# Patient Record
Sex: FEMALE | Race: WHITE | Employment: OTHER | URBAN - METROPOLITAN AREA
[De-identification: names, ages, dates, MRNs, and addresses within clinical notes are randomized per-mention and may not be internally consistent; named-entity substitution may affect disease eponyms.]

---

## 2019-08-13 ENCOUNTER — APPOINTMENT (OUTPATIENT)
Dept: ULTRASOUND IMAGING | Age: 64
End: 2019-08-13
Payer: MEDICARE

## 2019-08-16 ENCOUNTER — HOSPITAL ENCOUNTER (OUTPATIENT)
Dept: ULTRASOUND IMAGING | Age: 64
Discharge: HOME | End: 2019-08-18
Payer: MEDICARE

## 2019-08-16 ENCOUNTER — HOSPITAL ENCOUNTER (OUTPATIENT)
Dept: NON INVASIVE DIAGNOSTICS | Age: 64
Discharge: HOME | End: 2019-08-16
Payer: MEDICARE

## 2019-08-16 DIAGNOSIS — R42 DIZZINESS: ICD-10-CM

## 2019-08-16 LAB
LV EF: 65 %
LVEF MODALITY: NORMAL

## 2019-08-16 PROCEDURE — 93880 EXTRACRANIAL BILAT STUDY: CPT

## 2019-08-16 PROCEDURE — 93306 TTE W/DOPPLER COMPLETE: CPT

## 2020-01-26 ENCOUNTER — APPOINTMENT (OUTPATIENT)
Dept: GENERAL RADIOLOGY | Age: 65
End: 2020-01-26
Payer: MEDICARE

## 2020-01-26 ENCOUNTER — HOSPITAL ENCOUNTER (EMERGENCY)
Age: 65
Discharge: HOME | End: 2020-01-26
Payer: MEDICARE

## 2020-01-26 VITALS
HEIGHT: 63 IN | DIASTOLIC BLOOD PRESSURE: 78 MMHG | SYSTOLIC BLOOD PRESSURE: 138 MMHG | WEIGHT: 160 LBS | TEMPERATURE: 98.2 F | RESPIRATION RATE: 20 BRPM | BODY MASS INDEX: 28.35 KG/M2 | OXYGEN SATURATION: 98 % | HEART RATE: 80 BPM

## 2020-01-26 LAB
BASOPHILS ABSOLUTE: 0.06 E9/L (ref 0–0.2)
BASOPHILS RELATIVE PERCENT: 0.6 % (ref 0–2)
EOSINOPHILS ABSOLUTE: 0.1 E9/L (ref 0.05–0.5)
EOSINOPHILS RELATIVE PERCENT: 0.9 % (ref 0–6)
HCT VFR BLD CALC: 39.4 % (ref 34–48)
HEMOGLOBIN: 12.8 G/DL (ref 11.5–15.5)
IMMATURE GRANULOCYTES #: 0.06 E9/L
IMMATURE GRANULOCYTES %: 0.6 % (ref 0–5)
LYMPHOCYTES ABSOLUTE: 1.7 E9/L (ref 1.5–4)
LYMPHOCYTES RELATIVE PERCENT: 15.6 % (ref 20–42)
MCH RBC QN AUTO: 31.2 PG (ref 26–35)
MCHC RBC AUTO-ENTMCNC: 32.5 % (ref 32–34.5)
MCV RBC AUTO: 96.1 FL (ref 80–99.9)
MONOCYTES ABSOLUTE: 0.8 E9/L (ref 0.1–0.95)
MONOCYTES RELATIVE PERCENT: 7.4 % (ref 2–12)
NEUTROPHILS ABSOLUTE: 8.16 E9/L (ref 1.8–7.3)
NEUTROPHILS RELATIVE PERCENT: 74.9 % (ref 43–80)
PDW BLD-RTO: 13 FL (ref 11.5–15)
PLATELET # BLD: 330 E9/L (ref 130–450)
PMV BLD AUTO: 8.3 FL (ref 7–12)
RBC # BLD: 4.1 E12/L (ref 3.5–5.5)
WBC # BLD: 10.9 E9/L (ref 4.5–11.5)

## 2020-01-26 PROCEDURE — 96375 TX/PRO/DX INJ NEW DRUG ADDON: CPT

## 2020-01-26 PROCEDURE — 85025 COMPLETE CBC W/AUTO DIFF WBC: CPT

## 2020-01-26 PROCEDURE — 96365 THER/PROPH/DIAG IV INF INIT: CPT

## 2020-01-26 PROCEDURE — 73130 X-RAY EXAM OF HAND: CPT

## 2020-01-26 PROCEDURE — 2580000003 HC RX 258: Performed by: NURSE PRACTITIONER

## 2020-01-26 PROCEDURE — 6360000002 HC RX W HCPCS: Performed by: NURSE PRACTITIONER

## 2020-01-26 PROCEDURE — 90715 TDAP VACCINE 7 YRS/> IM: CPT | Performed by: NURSE PRACTITIONER

## 2020-01-26 PROCEDURE — 36415 COLL VENOUS BLD VENIPUNCTURE: CPT

## 2020-01-26 PROCEDURE — 90471 IMMUNIZATION ADMIN: CPT | Performed by: NURSE PRACTITIONER

## 2020-01-26 PROCEDURE — 99283 EMERGENCY DEPT VISIT LOW MDM: CPT

## 2020-01-26 RX ORDER — AMOXICILLIN AND CLAVULANATE POTASSIUM 875; 125 MG/1; MG/1
1 TABLET, FILM COATED ORAL 2 TIMES DAILY
Qty: 20 TABLET | Refills: 0 | Status: SHIPPED | OUTPATIENT
Start: 2020-01-26 | End: 2020-02-05

## 2020-01-26 RX ORDER — AMPICILLIN AND SULBACTAM 2; 1 G/1; G/1
INJECTION, POWDER, FOR SOLUTION INTRAMUSCULAR; INTRAVENOUS
Status: DISCONTINUED
Start: 2020-01-26 | End: 2020-01-26 | Stop reason: HOSPADM

## 2020-01-26 RX ORDER — KETOROLAC TROMETHAMINE 30 MG/ML
15 INJECTION, SOLUTION INTRAMUSCULAR; INTRAVENOUS ONCE
Status: COMPLETED | OUTPATIENT
Start: 2020-01-26 | End: 2020-01-26

## 2020-01-26 RX ORDER — NAPROXEN 500 MG/1
500 TABLET ORAL 2 TIMES DAILY PRN
Qty: 14 TABLET | Refills: 0 | Status: SHIPPED | OUTPATIENT
Start: 2020-01-26 | End: 2021-10-29

## 2020-01-26 RX ADMIN — SODIUM CHLORIDE 3 G: 9 INJECTION, SOLUTION INTRAVENOUS at 11:33

## 2020-01-26 RX ADMIN — TETANUS TOXOID, REDUCED DIPHTHERIA TOXOID AND ACELLULAR PERTUSSIS VACCINE, ADSORBED 0.5 ML: 5; 2.5; 8; 8; 2.5 SUSPENSION INTRAMUSCULAR at 11:33

## 2020-01-26 RX ADMIN — KETOROLAC TROMETHAMINE 15 MG: 30 INJECTION, SOLUTION INTRAMUSCULAR; INTRAVENOUS at 12:05

## 2020-01-26 ASSESSMENT — PAIN DESCRIPTION - ORIENTATION: ORIENTATION: RIGHT

## 2020-01-26 ASSESSMENT — PAIN DESCRIPTION - LOCATION: LOCATION: HAND

## 2020-01-26 ASSESSMENT — PAIN SCALES - GENERAL
PAINLEVEL_OUTOF10: 3
PAINLEVEL_OUTOF10: 3

## 2020-01-26 ASSESSMENT — PAIN DESCRIPTION - PAIN TYPE: TYPE: ACUTE PAIN

## 2020-01-26 ASSESSMENT — PAIN DESCRIPTION - DESCRIPTORS: DESCRIPTORS: DISCOMFORT

## 2020-01-26 NOTE — ED NOTES
Patient given discharge paperwork at this time. Patient also given scripts. Patient has no further questions at this time. Nurse provided education to patient. Patient is alert and oriented and VS are stable at this time.         Ana Massey RN  01/26/20 5948

## 2020-01-26 NOTE — ED PROVIDER NOTES
Eosinophils Absolute 0.10 0.05 - 0.50 E9/L    Basophils Absolute 0.06 0.00 - 0.20 E9/L     Imaging: All Radiology results interpreted by Radiologist unless otherwise noted. XR HAND RIGHT (MIN 3 VIEWS)   Final Result   Severe degenerative changes          ED Course / Medical Decision Making     Medications   ampicillin-sulbactam (UNASYN) 3 g ivpb minibag (3 g Intravenous New Bag 1/26/20 1133)   ampicillin-sulbactam (UNASYN) 3 (2-1) g injection (has no administration in time range)   ketorolac (TORADOL) injection 15 mg (has no administration in time range)   Tetanus-Diphth-Acell Pertussis (BOOSTRIX) injection 0.5 mL (0.5 mLs Intramuscular Given 1/26/20 1133)     ED Course as of Jan 26 1157   Sun Jan 26, 2020   1111 Dr. Gardiner Head into examine patient.     [SE]      ED Course User Index  [SE] Vidya Hollins, APRN - CNP     Re evaluation:       Consult(s):   None    Procedure(s):   none    MDM:   Cat bite to hand. X-rays negative for any retained foreign bodies. WBCs 10.9 she is afebrile, nontoxic in appearance hemodynamically stable she has no signs of flexor tenosynovitis of the hand and does have cellulitis to the dorsum of the hand it was outlined with surgical pen and instructed to return to the ED for any worsening of symptoms or any streaking, fever or chills. She has no systemic symptoms at this time was given an IV dose of Unasyn and will be discharged with NSAIDs and Augmentin. Her tetanus was updated today. Advised to follow-up with her PCP for reevaluation in the next 1 to 2 days. Counseling: The emergency provider has spoken with the patient and discussed todays results, in addition to providing specific details for the plan of care and counseling regarding the diagnosis and prognosis. Questions are answered at this time and they are agreeable with the plan. Assessment      1. Cat bite of hand, initial encounter    2. Cellulitis of hand    3. Need for Tdap vaccination    4.  Hand arthritis      Plan   Discharge to home  Patient condition is good    New Medications     New Prescriptions    AMOXICILLIN-CLAVULANATE (AUGMENTIN) 875-125 MG PER TABLET    Take 1 tablet by mouth 2 times daily for 10 days    NAPROXEN (NAPROSYN) 500 MG TABLET    Take 1 tablet by mouth 2 times daily as needed for Pain (take with food and full glass of water.)     Electronically signed by YAEL Henderson CNP   DD: 1/26/20  **This report was transcribed using voice recognition software. Every effort was made to ensure accuracy; however, inadvertent computerized transcription errors may be present.   END OF ED PROVIDER NOTE      YAEL Henderson CNP  01/26/20 0535

## 2021-02-24 ENCOUNTER — TELEPHONE (OUTPATIENT)
Dept: VASCULAR SURGERY | Age: 66
End: 2021-02-24

## 2021-02-24 NOTE — TELEPHONE ENCOUNTER
Called to confirm appointment for 2/25/21 at 26, left message with date, time, and phone number for patient.

## 2021-02-25 ENCOUNTER — OFFICE VISIT (OUTPATIENT)
Dept: VASCULAR SURGERY | Age: 66
End: 2021-02-25
Payer: MEDICARE

## 2021-02-25 ENCOUNTER — TELEPHONE (OUTPATIENT)
Dept: VASCULAR SURGERY | Age: 66
End: 2021-02-25

## 2021-02-25 VITALS
SYSTOLIC BLOOD PRESSURE: 122 MMHG | WEIGHT: 170 LBS | BODY MASS INDEX: 30.12 KG/M2 | DIASTOLIC BLOOD PRESSURE: 78 MMHG | HEIGHT: 63 IN

## 2021-02-25 DIAGNOSIS — R23.0 TOE CYANOSIS: ICD-10-CM

## 2021-02-25 DIAGNOSIS — F17.200 TOBACCO DEPENDENCE: ICD-10-CM

## 2021-02-25 DIAGNOSIS — R09.89 DECREASED DORSALIS PEDIS PULSE: ICD-10-CM

## 2021-02-25 DIAGNOSIS — I65.22 LEFT CAROTID STENOSIS: ICD-10-CM

## 2021-02-25 DIAGNOSIS — L97.521 ULCER OF GREAT TOE, LEFT, LIMITED TO BREAKDOWN OF SKIN (HCC): ICD-10-CM

## 2021-02-25 DIAGNOSIS — L81.9 DISCOLORATION OF SKIN OF TOE: ICD-10-CM

## 2021-02-25 DIAGNOSIS — R26.2 DIFFICULTY WALKING: ICD-10-CM

## 2021-02-25 PROCEDURE — 99204 OFFICE O/P NEW MOD 45 MIN: CPT | Performed by: SURGERY

## 2021-02-25 RX ORDER — BUSPIRONE HYDROCHLORIDE 15 MG/1
15 TABLET ORAL 3 TIMES DAILY
COMMUNITY
End: 2022-01-25 | Stop reason: SDUPTHER

## 2021-02-25 RX ORDER — ASPIRIN 81 MG/1
81 TABLET ORAL DAILY
COMMUNITY

## 2021-02-25 NOTE — PROGRESS NOTES
Chief Complaint:   Chief Complaint   Patient presents with    Consultation     new pt. MIAD         HPI: Patient came to the office, for evaluation of multiple vascular issues including known history of carotid artery disease and peripheral vascular disease    Patient noted discoloration of the toes, both feet, left foot more than the right foot, with area of skin breakdown of the left big toe, noted initially a month ago, slowly healing    Patient smokes about 1 pack/day    Patient denies history of diabetes    Denies any Covid virus infection    Patient has a cold exposure, walks in sneakers and socks does not wear insulated boots    Patient has significant musculoskeletal issues including arthritis the lumbar sacral spine and hip      Patient denies any focal lateralizing neurological symptoms like loss of speech, vision or loss of function of extremity    Patient can walk 1-2 blocks slowly, and denies any symptoms of rest pain    Allergies   Allergen Reactions    Codeine      I take vicodin, I had a reaction along time ago    Demerol        Current Outpatient Medications   Medication Sig Dispense Refill    metoprolol tartrate (LOPRESSOR) 25 MG tablet Take 25 mg by mouth 2 times daily      busPIRone (BUSPAR) 15 MG tablet Take 15 mg by mouth 3 times daily      hydrochlorothiazide (HYDRODIURIL) 25 MG tablet Take 25 mg by mouth daily      lisinopril (PRINIVIL;ZESTRIL) 40 MG tablet Take 20 mg by mouth daily       HYDROcodone-acetaminophen (NORCO) 7.5-325 MG per tablet       sertraline (ZOLOFT) 100 MG tablet Take 50 mg by mouth daily       lorazepam (ATIVAN) 0.5 MG tablet Take 1 mg by mouth every 6 hours as needed       naproxen (NAPROSYN) 500 MG tablet Take 1 tablet by mouth 2 times daily as needed for Pain (take with food and full glass of water.) 14 tablet 0    albuterol (PROAIR HFA) 108 (90 BASE) MCG/ACT inhaler Inhale 2 puffs into the lungs every 6 hours as needed for Wheezing for up to 7 days.  1 Inhaler 0     No current facility-administered medications for this visit.         Past Medical History:   Diagnosis Date    Anxiety     Back pain     Chronic back pain     Decreased dorsalis pedis pulse 2/25/2021    Depression     Discoloration of skin of toe 2/25/2021    GERD (gastroesophageal reflux disease)     Headache(784.0)     Hyperlipidemia     Hypertension     Left carotid stenosis 2/25/2021    Thyroid disease     Tobacco dependence 2/25/2021    Toe cyanosis 2/25/2021    Ulcer of great toe, left, limited to breakdown of skin ([address] 2/25/2021       Past Surgical History:   Procedure Laterality Date    ABLATION OF DYSRHYTHMIC FOCUS      CARPAL TUNNEL RELEASE      CHOLECYSTECTOMY      FOOT SURGERY      right foot surgery 5 years ago    HYSTERECTOMY         Family History   Problem Relation Age of Onset    Cancer Mother         breast    Stroke Father        Social History     Socioeconomic History    Marital status:      Spouse name: Not on file    Number of children: Not on file    Years of education: Not on file    Highest education level: Not on file   Occupational History    Not on file   Social Needs    Financial resource strain: Not on file    Food insecurity     Worry: Not on file     Inability: Not on file    Transportation needs     Medical: Not on file     Non-medical: Not on file   Tobacco Use    Smoking status: Current Every Day Smoker     Packs/day: 1.00     Types: Cigarettes    Smokeless tobacco: Never Used   Substance and Sexual Activity    Alcohol use: Yes     Comment: social. rare    Drug use: No    Sexual activity: Not on file   Lifestyle    Physical activity     Days per week: Not on file     Minutes per session: Not on file    Stress: Not on file   Relationships    Social connections     Talks on phone: Not on file     Gets together: Not on file     Attends Yarsanism service: Not on file     Active member of club or organization: Not on file Attends meetings of clubs or organizations: Not on file     Relationship status: Not on file    Intimate partner violence     Fear of current or ex partner: Not on file     Emotionally abused: Not on file     Physically abused: Not on file     Forced sexual activity: Not on file   Other Topics Concern    Not on file   Social History Narrative    Not on file       Review of Systems:  Skin:  No abnormal pigmentation or rash  Eyes:  No blurring, diplopia or vision loss  Ears/Nose/Throat:  No hearing loss or vertigo  Respiratory:  No cough, pleuritic chest pain, dyspnea, or wheezing. Tobacco use, chronic obstructive lung disease  Cardiovascular: No angina, palpitations . Hypertension, hyperlipidemia  Gastrointestinal:  No nausea or vomiting; no abdominal pain or rectal bleeding  Musculoskeletal:  No arthritis or weakness. Neurologic:  No paralysis, paresis, paresthesia, seizures or headaches  Hematologic/Lymphatic/Immunologic:  No anemia, abnormal bleeding/bruising, fever, chills or night sweats. Endocrine:  No heat or cold intolerance. No polyphagia, polydipsia or polyuria. Physical Exam:  General appearance:  Alert, awake, oriented x 3. No distress. Skin:  Warm and dry  Head:  Normocephalic. No masses, lesions or tenderness  Eyes:  Conjunctivae appear normal; PERRL  Ears:  External ears normal  Nose/Sinuses:  Septum midline, mucosa normal; no drainage  Oropharynx:  Clear, no exudate noted  Neck:  No jugular venous distention, lymphadenopathy or thyromegaly. No evidence of carotid bruit  Lungs:  Clear to ausculation bilaterally. No rhonchi, crackles, wheezes  Heart:  Regular rate and rhythm. No rub or murmur  Abdomen:  Soft, non-tender. No masses, organomegaly. Musculoskeletal : No joint effusions, tenderness swelling    Neuro: Speech is intact. Moving all extremities. No focal motor or sensory deficits      Extremities:  Both feet are warm to touch.  The color of both feet is normal.    Discoloration, with cyanosis of the toes noted with areas of skin breakdown that is healing of the left big toe    Pulses Right  Left    Brachial 3 3    Radial    3=normal   Femoral 2 2  2=diminished   Popliteal    1=barely palpable   Dorsalis pedis 1 1  0=absent   Posterior tibial    4=aneurysmal             Other pertinent information:1. The past medical records were reviewed. 2.  Last carotid ultrasound done a year ago was reviewed, left carotid stenosis noted, almost 40 to 50%, right side, less than 40%    Assessment:    1. Tobacco dependence    2. Left carotid stenosis    3. Ulcer of great toe, left, limited to breakdown of skin ([address]    4. Toe cyanosis    5. Discoloration of skin of toe    6. Difficulty walking    7. Decreased dorsalis pedis pulse              Plan:       Discussed the patient, options risks benefits and alternatives were explained to the patient    Patient was recommended to stop smoking completely and continue taking low-dose 81 mg aspirin daily and work-up as outlined below and call me if there are any increasing symptoms          Patient was instructed to continue walking program and to call if any worsening of symptoms and to call if any focal lateralizing neurological symptoms like loss of speech, vision or loss of function of extremity. All the questions were answered. Orders Placed This Encounter   Procedures    VL LOWER EXTREMITY ARTERIAL SEGMENTAL PRESSURES W PPG    US CAROTID ARTERY BILATERAL    SANDY    Anti-DNA Antibody, Double-Stranded    Cryofibrinogen    Cryoglobulin    Rheumatoid Factor    Sedimentation rate, manual             Indicated follow-up: Return in about 4 weeks (around 3/25/2021), or if symptoms worsen or fail to improve.

## 2021-03-05 ENCOUNTER — HOSPITAL ENCOUNTER (OUTPATIENT)
Dept: INTERVENTIONAL RADIOLOGY/VASCULAR | Age: 66
Discharge: HOME | End: 2021-03-07
Payer: MEDICARE

## 2021-03-05 ENCOUNTER — HOSPITAL ENCOUNTER (OUTPATIENT)
Dept: ULTRASOUND IMAGING | Age: 66
Discharge: HOME | End: 2021-03-07
Payer: MEDICARE

## 2021-03-05 ENCOUNTER — HOSPITAL ENCOUNTER (OUTPATIENT)
Age: 66
Discharge: HOME | End: 2021-03-05
Payer: MEDICARE

## 2021-03-05 DIAGNOSIS — I65.22 LEFT CAROTID STENOSIS: ICD-10-CM

## 2021-03-05 DIAGNOSIS — R23.0 TOE CYANOSIS: ICD-10-CM

## 2021-03-05 DIAGNOSIS — R09.89 DECREASED DORSALIS PEDIS PULSE: ICD-10-CM

## 2021-03-05 DIAGNOSIS — L81.9 DISCOLORATION OF SKIN OF TOE: ICD-10-CM

## 2021-03-05 DIAGNOSIS — L97.521 ULCER OF GREAT TOE, LEFT, LIMITED TO BREAKDOWN OF SKIN (HCC): ICD-10-CM

## 2021-03-05 LAB
RHEUMATOID FACTOR: <10 IU/ML (ref 0–13)
SEDIMENTATION RATE, ERYTHROCYTE: 23 MM/HR (ref 0–20)

## 2021-03-05 PROCEDURE — 36415 COLL VENOUS BLD VENIPUNCTURE: CPT

## 2021-03-05 PROCEDURE — 93923 UPR/LXTR ART STDY 3+ LVLS: CPT

## 2021-03-05 PROCEDURE — 85651 RBC SED RATE NONAUTOMATED: CPT

## 2021-03-05 PROCEDURE — 82595 ASSAY OF CRYOGLOBULIN: CPT

## 2021-03-05 PROCEDURE — 86038 ANTINUCLEAR ANTIBODIES: CPT

## 2021-03-05 PROCEDURE — 93880 EXTRACRANIAL BILAT STUDY: CPT | Performed by: RADIOLOGY

## 2021-03-05 PROCEDURE — 82585 ASSAY OF CRYOFIBRINOGEN: CPT

## 2021-03-05 PROCEDURE — 86225 DNA ANTIBODY NATIVE: CPT

## 2021-03-05 PROCEDURE — 86431 RHEUMATOID FACTOR QUANT: CPT

## 2021-03-05 PROCEDURE — 93880 EXTRACRANIAL BILAT STUDY: CPT

## 2021-03-06 LAB — CRYOFIBRINOGEN: NEGATIVE

## 2021-03-08 LAB
ANTI DNA DOUBLE STRANDED: NEGATIVE
ANTI-NUCLEAR ANTIBODY (ANA): NEGATIVE
CRYOGLOBULIN: NEGATIVE

## 2021-03-12 ENCOUNTER — TELEPHONE (OUTPATIENT)
Dept: VASCULAR SURGERY | Age: 66
End: 2021-03-12

## 2021-03-12 PROBLEM — I65.23 BILATERAL CAROTID ARTERY STENOSIS: Status: ACTIVE | Noted: 2021-03-12

## 2021-03-12 RX ORDER — CILOSTAZOL 100 MG/1
100 TABLET ORAL 2 TIMES DAILY
Qty: 60 TABLET | Refills: 11 | Status: SHIPPED
Start: 2021-03-12 | End: 2022-01-25 | Stop reason: SDUPTHER

## 2021-03-12 NOTE — TELEPHONE ENCOUNTER
Message left on patient telephone regarding the rules of the carotid ultrasound, bilaterally 60% stenosis, recheck in 6 months call if any symptoms, arterial Doppler study, left femoral-popliteal arterial occlusive disease, ankle-brachial index of 0.70 with decreased pulse volume control left great toe and second toe, recommend to stop smoking, continue the aspirin, trial of Pletal 100 mg p.o. twice daily, keep the appointment see me in the next few weeks, call the office to discuss further, lab work was okay

## 2021-03-24 ENCOUNTER — TELEPHONE (OUTPATIENT)
Dept: VASCULAR SURGERY | Age: 66
End: 2021-03-24

## 2021-03-25 ENCOUNTER — OFFICE VISIT (OUTPATIENT)
Dept: VASCULAR SURGERY | Age: 66
End: 2021-03-25
Payer: MEDICARE

## 2021-03-25 VITALS — WEIGHT: 170 LBS | BODY MASS INDEX: 30.12 KG/M2 | HEIGHT: 63 IN

## 2021-03-25 DIAGNOSIS — L81.9 DISCOLORATION OF SKIN OF TOE: ICD-10-CM

## 2021-03-25 DIAGNOSIS — F17.200 TOBACCO DEPENDENCE: Primary | ICD-10-CM

## 2021-03-25 DIAGNOSIS — R26.2 DIFFICULTY WALKING: ICD-10-CM

## 2021-03-25 DIAGNOSIS — R09.89 DECREASED DORSALIS PEDIS PULSE: ICD-10-CM

## 2021-03-25 DIAGNOSIS — R23.0 TOE CYANOSIS: ICD-10-CM

## 2021-03-25 DIAGNOSIS — I65.23 BILATERAL CAROTID ARTERY STENOSIS: ICD-10-CM

## 2021-03-25 PROCEDURE — 99214 OFFICE O/P EST MOD 30 MIN: CPT | Performed by: SURGERY

## 2021-03-25 NOTE — PROGRESS NOTES
Chief Complaint:   Chief Complaint   Patient presents with    Circulatory Problem     carotid         HPI: Patient came to the office, for the evaluation of vascular status of the left foot, tells me that the discoloration of the toes is improved, the area of skin breakdown near the great toenail has healed     No ulceration or pain of the left foot    Patient also has some back problems affecting the left leg but stable      The patient is trying to quit smoking completely          Patient denies any focal lateralizing neurological symptoms like loss of speech, vision or loss of function of extremity    Patient can walk a few blocks slowly, and denies any symptoms of rest pain    Allergies   Allergen Reactions    Codeine      I take vicodin, I had a reaction along time ago    Demerol        Current Outpatient Medications   Medication Sig Dispense Refill    cilostazol (PLETAL) 100 MG tablet Take 1 tablet by mouth 2 times daily (Patient not taking: Reported on 3/25/2021) 60 tablet 11    metoprolol tartrate (LOPRESSOR) 25 MG tablet Take 25 mg by mouth 2 times daily      busPIRone (BUSPAR) 15 MG tablet Take 15 mg by mouth 3 times daily      aspirin 81 MG EC tablet Take 81 mg by mouth daily      naproxen (NAPROSYN) 500 MG tablet Take 1 tablet by mouth 2 times daily as needed for Pain (take with food and full glass of water.) 14 tablet 0    hydrochlorothiazide (HYDRODIURIL) 25 MG tablet Take 25 mg by mouth daily      lisinopril (PRINIVIL;ZESTRIL) 40 MG tablet Take 20 mg by mouth daily       albuterol (PROAIR HFA) 108 (90 BASE) MCG/ACT inhaler Inhale 2 puffs into the lungs every 6 hours as needed for Wheezing for up to 7 days. 1 Inhaler 0    HYDROcodone-acetaminophen (NORCO) 7.5-325 MG per tablet       sertraline (ZOLOFT) 100 MG tablet Take 50 mg by mouth daily       lorazepam (ATIVAN) 0.5 MG tablet Take 1 mg by mouth every 6 hours as needed        No current facility-administered medications for this visit. Past Medical History:   Diagnosis Date    Anxiety     Back pain     Bilateral carotid artery stenosis 3/12/2021    Chronic back pain     Decreased dorsalis pedis pulse 2/25/2021    Depression     Discoloration of skin of toe 2/25/2021    GERD (gastroesophageal reflux disease)     Headache(784.0)     Hyperlipidemia     Hypertension     Left carotid stenosis 2/25/2021    Thyroid disease     Tobacco dependence 2/25/2021    Toe cyanosis 2/25/2021    Ulcer of great toe, left, limited to breakdown of skin ([address] 2/25/2021       Past Surgical History:   Procedure Laterality Date    ABLATION OF DYSRHYTHMIC FOCUS      CARPAL TUNNEL RELEASE      CHOLECYSTECTOMY      FOOT SURGERY      right foot surgery 5 years ago    HYSTERECTOMY         Family History   Problem Relation Age of Onset    Cancer Mother         breast    Stroke Father        Social History     Socioeconomic History    Marital status:      Spouse name: Not on file    Number of children: Not on file    Years of education: Not on file    Highest education level: Not on file   Occupational History    Not on file   Social Needs    Financial resource strain: Not on file    Food insecurity     Worry: Not on file     Inability: Not on file    Transportation needs     Medical: Not on file     Non-medical: Not on file   Tobacco Use    Smoking status: Current Every Day Smoker     Packs/day: 1.00     Types: Cigarettes    Smokeless tobacco: Never Used   Substance and Sexual Activity    Alcohol use: Yes     Comment: social. rare    Drug use: No    Sexual activity: Not on file   Lifestyle    Physical activity     Days per week: Not on file     Minutes per session: Not on file    Stress: Not on file   Relationships    Social connections     Talks on phone: Not on file     Gets together: Not on file     Attends [employer] service: Not on file     Active member of club or organization: Not on file     Attends meetings of clubs or organizations: Not on file     Relationship status: Not on file    Intimate partner violence     Fear of current or ex partner: Not on file     Emotionally abused: Not on file     Physically abused: Not on file     Forced sexual activity: Not on file   Other Topics Concern    Not on file   Social History Narrative    Not on file       Review of Systems:    Eyes:  No blurring, diplopia or vision loss. Respiratory:  No cough, pleuritic chest pain, dyspnea, or wheezing. Tobacco use, chronic obstructive lung disease  Cardiovascular: No angina, palpitations . Hypertension, hyperlipidemia  Musculoskeletal:  No arthritis or weakness. Neurologic:  No paralysis, paresis, paresthesia, seizures or headache. Physical Exam:  General appearance:  Alert, awake, oriented x 3. No distress. Eyes:  Conjunctivae appear normal; PERRL  Neck:  No jugular venous distention, lymphadenopathy or thyromegaly. Carotid bruit noted  Lungs:  Clear to ausculation bilaterally. No rhonchi, crackles, wheezes  Heart:  Regular rate and rhythm. No rub or murmur  Abdomen:  Soft, non-tender. No masses, organomegaly. Musculoskeletal : No joint effusions, tenderness swelling    Neuro: Speech is intact. Moving all extremities. No focal motor or sensory deficits      Extremities:  Both feet are warm to touch. The color of both feet is normal.    The area of skin breakdown over the left great toe has healed completely, patient still has slight purplish discoloration of the toes of the left foot compared to the right side, range of motion is intact, overall improvement noted    Pulses Right  Left    Brachial 3 3    Radial    3=normal   Femoral 2 2  2=diminished   Popliteal    1=barely palpable   Dorsalis pedis 2 1  0=absent   Posterior tibial    4=aneurysmal             Other pertinent information:1. The past medical records were reviewed. Assessment:    1. Tobacco dependence    2. Toe cyanosis    3.  Discoloration of skin of toe 4. Decreased dorsalis pedis pulse    5. Bilateral carotid artery stenosis    6. Difficulty walking              Plan:       I had a long detailed discussion the patient regarding the artery Doppler studies and the carotid ultrasound again, reassured, counseled to stop smoking completely, continue the aspirin Pletal, call me as needed for increasing symptoms, explained, if the left foot symptoms get any worse, patient will need angiography to consider the possibility of intervention, all the questions were answered              Patient was instructed to continue walking program and to call if any worsening of symptoms and to call if any focal lateralizing neurological symptoms like loss of speech, vision or loss of function of extremity. All the questions were answered. No orders of the defined types were placed in this encounter. Indicated follow-up: Return in about 2 months (around 5/25/2021), or if symptoms worsen or fail to improve.

## 2021-06-02 ENCOUNTER — TELEPHONE (OUTPATIENT)
Dept: VASCULAR SURGERY | Age: 66
End: 2021-06-02

## 2021-06-03 ENCOUNTER — OFFICE VISIT (OUTPATIENT)
Dept: VASCULAR SURGERY | Age: 66
End: 2021-06-03
Payer: MEDICARE

## 2021-06-03 VITALS — WEIGHT: 170 LBS | BODY MASS INDEX: 30.12 KG/M2 | HEIGHT: 63 IN

## 2021-06-03 DIAGNOSIS — L81.9 DISCOLORATION OF SKIN OF TOE: ICD-10-CM

## 2021-06-03 DIAGNOSIS — I65.23 BILATERAL CAROTID ARTERY STENOSIS: ICD-10-CM

## 2021-06-03 DIAGNOSIS — F17.200 TOBACCO DEPENDENCE: ICD-10-CM

## 2021-06-03 DIAGNOSIS — R26.2 DIFFICULTY WALKING: ICD-10-CM

## 2021-06-03 DIAGNOSIS — R09.89 DECREASED DORSALIS PEDIS PULSE: ICD-10-CM

## 2021-06-03 DIAGNOSIS — I65.23 CAROTID STENOSIS, ASYMPTOMATIC, BILATERAL: Primary | ICD-10-CM

## 2021-06-03 DIAGNOSIS — I73.9 PVD (PERIPHERAL VASCULAR DISEASE) WITH CLAUDICATION (HCC): ICD-10-CM

## 2021-06-03 PROBLEM — L97.521 ULCER OF GREAT TOE, LEFT, LIMITED TO BREAKDOWN OF SKIN (HCC): Status: RESOLVED | Noted: 2021-02-25 | Resolved: 2021-06-03

## 2021-06-03 PROCEDURE — 99213 OFFICE O/P EST LOW 20 MIN: CPT | Performed by: SURGERY

## 2021-06-03 RX ORDER — GABAPENTIN 300 MG/1
300 CAPSULE ORAL 3 TIMES DAILY
COMMUNITY
End: 2022-01-25 | Stop reason: SDUPTHER

## 2021-06-03 NOTE — PROGRESS NOTES
puffs into the lungs every 6 hours as needed for Wheezing for up to 7 days. 1 Inhaler 0     No current facility-administered medications for this visit.        Past Medical History:   Diagnosis Date    Anxiety     Back pain     Bilateral carotid artery stenosis 3/12/2021    Chronic back pain     Decreased dorsalis pedis pulse 2/25/2021    Depression     Discoloration of skin of toe 2/25/2021    GERD (gastroesophageal reflux disease)     Headache(784.0)     Hyperlipidemia     Hypertension     Left carotid stenosis 2/25/2021    PVD (peripheral vascular disease) with claudication ([address] 6/3/2021    Thyroid disease     Tobacco dependence 2/25/2021    Toe cyanosis 2/25/2021    Ulcer of great toe, left, limited to breakdown of skin ([address] 2/25/2021       Past Surgical History:   Procedure Laterality Date    ABLATION OF DYSRHYTHMIC FOCUS      CARPAL TUNNEL RELEASE      CHOLECYSTECTOMY      FOOT SURGERY      right foot surgery 5 years ago    HYSTERECTOMY         Family History   Problem Relation Age of Onset    Cancer Mother         breast    Stroke Father        Social History     Socioeconomic History    Marital status:      Spouse name: Not on file    Number of children: Not on file    Years of education: Not on file    Highest education level: Not on file   Occupational History    Not on file   Tobacco Use    Smoking status: Current Every Day Smoker     Packs/day: 1.00     Types: Cigarettes    Smokeless tobacco: Never Used   Substance and Sexual Activity    Alcohol use: Yes     Comment: social. rare    Drug use: No    Sexual activity: Not on file   Other Topics Concern    Not on file   Social History Narrative    Not on file     Social Determinants of Health     Financial Resource Strain:     Difficulty of Paying Living Expenses:    Food Insecurity:     Worried About Running Out of Food in the Last Year:     Ran Out of Food in the Last Year:    Transportation Needs:     Lack

## 2021-06-19 ENCOUNTER — HOSPITAL ENCOUNTER (EMERGENCY)
Age: 66
Discharge: HOME | End: 2021-06-19
Attending: EMERGENCY MEDICINE
Payer: MEDICARE

## 2021-06-19 ENCOUNTER — APPOINTMENT (OUTPATIENT)
Dept: GENERAL RADIOLOGY | Age: 66
End: 2021-06-19
Payer: MEDICARE

## 2021-06-19 VITALS
HEART RATE: 72 BPM | BODY MASS INDEX: 30.12 KG/M2 | TEMPERATURE: 97.5 F | WEIGHT: 170 LBS | OXYGEN SATURATION: 96 % | SYSTOLIC BLOOD PRESSURE: 131 MMHG | DIASTOLIC BLOOD PRESSURE: 63 MMHG | RESPIRATION RATE: 20 BRPM | HEIGHT: 63 IN

## 2021-06-19 DIAGNOSIS — R07.9 CHEST PAIN, UNSPECIFIED TYPE: Primary | ICD-10-CM

## 2021-06-19 LAB
ALBUMIN SERPL-MCNC: 3.8 G/DL (ref 3.5–5.2)
ALP BLD-CCNC: 64 U/L (ref 35–104)
ALT SERPL-CCNC: 11 U/L (ref 0–32)
ANION GAP SERPL CALCULATED.3IONS-SCNC: 10 MMOL/L (ref 7–16)
APTT: 28 SEC (ref 24.5–35.1)
AST SERPL-CCNC: 14 U/L (ref 0–31)
BASOPHILS ABSOLUTE: 0.07 E9/L (ref 0–0.2)
BASOPHILS RELATIVE PERCENT: 0.9 % (ref 0–2)
BILIRUB SERPL-MCNC: 0.2 MG/DL (ref 0–1.2)
BUN BLDV-MCNC: 16 MG/DL (ref 6–23)
CALCIUM SERPL-MCNC: 9.2 MG/DL (ref 8.6–10.2)
CHLORIDE BLD-SCNC: 105 MMOL/L (ref 98–107)
CO2: 23 MMOL/L (ref 22–29)
CREAT SERPL-MCNC: 0.9 MG/DL (ref 0.5–1)
EOSINOPHILS ABSOLUTE: 0.2 E9/L (ref 0.05–0.5)
EOSINOPHILS RELATIVE PERCENT: 2.6 % (ref 0–6)
GFR AFRICAN AMERICAN: >60
GFR NON-AFRICAN AMERICAN: >60 ML/MIN/1.73
GLUCOSE BLD-MCNC: 86 MG/DL (ref 74–99)
HCT VFR BLD CALC: 39.1 % (ref 34–48)
HEMOGLOBIN: 12.9 G/DL (ref 11.5–15.5)
IMMATURE GRANULOCYTES #: 0.04 E9/L
IMMATURE GRANULOCYTES %: 0.5 % (ref 0–5)
INR BLD: 0.9
LYMPHOCYTES ABSOLUTE: 2.47 E9/L (ref 1.5–4)
LYMPHOCYTES RELATIVE PERCENT: 32 % (ref 20–42)
MCH RBC QN AUTO: 31 PG (ref 26–35)
MCHC RBC AUTO-ENTMCNC: 33 % (ref 32–34.5)
MCV RBC AUTO: 94 FL (ref 80–99.9)
MONOCYTES ABSOLUTE: 0.64 E9/L (ref 0.1–0.95)
MONOCYTES RELATIVE PERCENT: 8.3 % (ref 2–12)
NEUTROPHILS ABSOLUTE: 4.31 E9/L (ref 1.8–7.3)
NEUTROPHILS RELATIVE PERCENT: 55.7 % (ref 43–80)
PDW BLD-RTO: 13.3 FL (ref 11.5–15)
PLATELET # BLD: 305 E9/L (ref 130–450)
PMV BLD AUTO: 8.6 FL (ref 7–12)
POTASSIUM REFLEX MAGNESIUM: 3.9 MMOL/L (ref 3.5–5)
PROTHROMBIN TIME: 10.1 SEC (ref 9.3–12.4)
RBC # BLD: 4.16 E12/L (ref 3.5–5.5)
SODIUM BLD-SCNC: 138 MMOL/L (ref 132–146)
TOTAL PROTEIN: 6.4 G/DL (ref 6.4–8.3)
TROPONIN, HIGH SENSITIVITY: 10 NG/L (ref 0–9)
TROPONIN, HIGH SENSITIVITY: 7 NG/L (ref 0–9)
TROPONIN, HIGH SENSITIVITY: 8 NG/L (ref 0–9)
WBC # BLD: 7.7 E9/L (ref 4.5–11.5)

## 2021-06-19 PROCEDURE — 99285 EMERGENCY DEPT VISIT HI MDM: CPT

## 2021-06-19 PROCEDURE — 84484 ASSAY OF TROPONIN QUANT: CPT

## 2021-06-19 PROCEDURE — 6370000000 HC RX 637 (ALT 250 FOR IP): Performed by: EMERGENCY MEDICINE

## 2021-06-19 PROCEDURE — 36415 COLL VENOUS BLD VENIPUNCTURE: CPT

## 2021-06-19 PROCEDURE — 85025 COMPLETE CBC W/AUTO DIFF WBC: CPT

## 2021-06-19 PROCEDURE — 93005 ELECTROCARDIOGRAM TRACING: CPT | Performed by: EMERGENCY MEDICINE

## 2021-06-19 PROCEDURE — 80053 COMPREHEN METABOLIC PANEL: CPT

## 2021-06-19 PROCEDURE — 71045 X-RAY EXAM CHEST 1 VIEW: CPT

## 2021-06-19 PROCEDURE — 85730 THROMBOPLASTIN TIME PARTIAL: CPT

## 2021-06-19 PROCEDURE — 85610 PROTHROMBIN TIME: CPT

## 2021-06-19 RX ORDER — NICOTINE 21 MG/24HR
1 PATCH, TRANSDERMAL 24 HOURS TRANSDERMAL ONCE
Status: DISCONTINUED | OUTPATIENT
Start: 2021-06-19 | End: 2021-06-19 | Stop reason: HOSPADM

## 2021-06-19 NOTE — ED PROVIDER NOTES
HPI:  6/19/21,   Time: 12:56 PM EDT       Kylah Nevarez is a 74 y.o. female presenting to the ED for cp, beginning 2 hrs ago. The complaint has been intermittent, mild in severity, and worsened by nothing. Bib ems, pain relieved now, left sided, achy in nature. No cough/congestion/runny nose/sore throat. Given asa by ems. Hx same years ago, never found cause. No sob/sweats/fever/chills. No rash/achy muscles    Review of Systems:   Pertinent positives and negatives are stated within HPI, all other systems reviewed and are negative.          --------------------------------------------- PAST HISTORY ---------------------------------------------  Past Medical History:  has a past medical history of Anxiety, Back pain, Bilateral carotid artery stenosis, Chronic back pain, Decreased dorsalis pedis pulse, Depression, Discoloration of skin of toe, GERD (gastroesophageal reflux disease), Headache(784.0), Hyperlipidemia, Hypertension, Left carotid stenosis, PVD (peripheral vascular disease) with claudication ([address] Thyroid disease, Tobacco dependence, Toe cyanosis, and Ulcer of great toe, left, limited to breakdown of skin ([address] Past Surgical History:  has a past surgical history that includes ablation of dysrhythmic focus; Hysterectomy; Cholecystectomy; Carpal tunnel release; and Foot surgery. Social History:  reports that she has been smoking cigarettes. She has been smoking about 1.00 pack per day. She has never used smokeless tobacco. She reports current alcohol use. She reports that she does not use drugs. Family History: family history includes Cancer in her mother; Stroke in her father. The patients home medications have been reviewed.     Allergies: Codeine and Demerol        ---------------------------------------------------PHYSICAL EXAM--------------------------------------    Constitutional/General: Alert and oriented x3, well appearing, non toxic in NAD  Head: Normocephalic and atraumatic  Eyes: PERRL, EOMI, conjunctive normal, sclera non icteric  Mouth: Oropharynx clear, handling secretions,   Neck: Supple, full ROM,   Respiratory: Lungs clear to auscultation bilaterally, no wheezes, rales, or rhonchi. Not in respiratory distress  Cardiovascular:  Regular rate. Regular rhythm. No murmurs, gallops, or rubs. 2+ distal pulses  Chest: No chest wall tenderness  GI:  Abdomen Soft, Non tender, Non distended. Musculoskeletal: Moves all extremities x 4. Warm and well perfused, no clubbing, cyanosis, or edema. Capillary refill <3 seconds  Integument: skin warm and dry. No rashes. Lymphatic: no lymphadenopathy noted  Neurologic: GCS 15, no focal deficits, s  Psychiatric: Normal Affect    -------------------------------------------------- RESULTS -------------------------------------------------  I have personally reviewed all laboratory and imaging results for this patient. Results are listed below.      LABS:  Results for orders placed or performed during the hospital encounter of 06/19/21   CBC Auto Differential   Result Value Ref Range    WBC 7.7 4.5 - 11.5 E9/L    RBC 4.16 3.50 - 5.50 E12/L    Hemoglobin 12.9 11.5 - 15.5 g/dL    Hematocrit 39.1 34.0 - 48.0 %    MCV 94.0 80.0 - 99.9 fL    MCH 31.0 26.0 - 35.0 pg    MCHC 33.0 32.0 - 34.5 %    RDW 13.3 11.5 - 15.0 fL    Platelets 626 956 - 079 E9/L    MPV 8.6 7.0 - 12.0 fL    Neutrophils % 55.7 43.0 - 80.0 %    Immature Granulocytes % 0.5 0.0 - 5.0 %    Lymphocytes % 32.0 20.0 - 42.0 %    Monocytes % 8.3 2.0 - 12.0 %    Eosinophils % 2.6 0.0 - 6.0 %    Basophils % 0.9 0.0 - 2.0 %    Neutrophils Absolute 4.31 1.80 - 7.30 E9/L    Immature Granulocytes # 0.04 E9/L    Lymphocytes Absolute 2.47 1.50 - 4.00 E9/L    Monocytes Absolute 0.64 0.10 - 0.95 E9/L    Eosinophils Absolute 0.20 0.05 - 0.50 E9/L    Basophils Absolute 0.07 0.00 - 0.20 E9/L   Comprehensive Metabolic Panel w/ Reflex to MG   Result Value Ref Range    Sodium 138 132 - 146 mmol/L Potassium reflex Magnesium 3.9 3.5 - 5.0 mmol/L    Chloride 105 98 - 107 mmol/L    CO2 23 22 - 29 mmol/L    Anion Gap 10 7 - 16 mmol/L    Glucose 86 74 - 99 mg/dL    BUN 16 6 - 23 mg/dL    CREATININE 0.9 0.5 - 1.0 mg/dL    GFR Non-African American >60 >=60 mL/min/1.73    GFR African American >60     Calcium 9.2 8.6 - 10.2 mg/dL    Total Protein 6.4 6.4 - 8.3 g/dL    Albumin 3.8 3.5 - 5.2 g/dL    Total Bilirubin 0.2 0.0 - 1.2 mg/dL    Alkaline Phosphatase 64 35 - 104 U/L    ALT 11 0 - 32 U/L    AST 14 0 - 31 U/L   Troponin   Result Value Ref Range    Troponin, High Sensitivity 7 0 - 9 ng/L   Protime-INR   Result Value Ref Range    Protime 10.1 9.3 - 12.4 sec    INR 0.9    APTT   Result Value Ref Range    aPTT 28.0 24.5 - 35.1 sec   Troponin   Result Value Ref Range    Troponin, High Sensitivity 10 (H) 0 - 9 ng/L   Troponin   Result Value Ref Range    Troponin, High Sensitivity 8 0 - 9 ng/L       RADIOLOGY:  Interpreted by Radiologist.  XR CHEST PORTABLE   Final Result   No radiographic evidence of acute cardiopulmonary disease process             EKG:  This EKG is signed and interpreted by the EP. Time: 1256  Rate: 70  Rhythm: Sinus  Interpretation: non-specific EKG  Comparison: None      ------------------------- NURSING NOTES AND VITALS REVIEWED ---------------------------   The nursing notes within the ED encounter and vital signs as below have been reviewed by myself. /63   Pulse 72   Temp 97.5 °F (36.4 °C)   Resp 20   Ht 5' 3\" (1.6 m)   Wt 170 lb (77.1 kg)   SpO2 96%   BMI 30.11 kg/m²   Oxygen Saturation Interpretation: Normal    The patients available past medical records and past encounters were reviewed.         ------------------------------ ED COURSE/MEDICAL DECISION MAKING----------------------  Medications   nicotine (NICODERM CQ) 21 MG/24HR 1 patch (1 patch Transdermal Patch Applied 6/19/21 3502)         ED COURSE:       Medical Decision Making:    Pt trop x 3 within 5, hear score under 6, feel can dc with close outpt fu. Pt agreeable with poc. Sx not pe clinically      This patient's ED course included: a personal history and physicial examination    This patient has remained hemodynamically stable during their ED course. Re-Evaluations:             Re-evaluation. Patients symptoms show no change    Re-examination  6/19/21   4:56 PM EDT          Vital Signs:   Vitals:    06/19/21 1240 06/19/21 1543 06/19/21 1742   BP: (!) 144/96 (!) 140/71 131/63   Pulse: 71 72 72   Resp: 16 17 20   Temp: 97.5 °F (36.4 °C)     SpO2: 96% 94% 96%   Weight: 170 lb (77.1 kg)     Height: 5' 3\" (1.6 m)       Card/Pulm:  Rhythm: normal rate. Heart Sounds: no murmurs, gallops, or rubs. clear to auscultation, no wheezes or rales and unlabored breathing. Capillary Refill: normal.  Radial Pulse:  equal.  Skin:  Warm. Consultations:                 Critical Care:         Counseling: The emergency provider has spoken with the patient and discussed todays results, in addition to providing specific details for the plan of care and counseling regarding the diagnosis and prognosis. Questions are answered at this time and they are agreeable with the plan.       --------------------------------- IMPRESSION AND DISPOSITION ---------------------------------    IMPRESSION  1. Chest pain, unspecified type        DISPOSITION  Disposition: Discharge to home  Patient condition is stable    NOTE: This report was transcribed using voice recognition software.  Every effort was made to ensure accuracy; however, inadvertent computerized transcription errors may be present        Dwight Gibson MD  06/19/21 0898

## 2021-06-19 NOTE — ED NOTES
Bed: 14A-14  Expected date:   Expected time:   Means of arrival:   Comments:  Zoraida Dukes female     Job Rather, RN  06/19/21 7919

## 2021-06-20 LAB
EKG ATRIAL RATE: 65 BPM
EKG P AXIS: 27 DEGREES
EKG P-R INTERVAL: 194 MS
EKG Q-T INTERVAL: 406 MS
EKG QRS DURATION: 76 MS
EKG QTC CALCULATION (BAZETT): 422 MS
EKG R AXIS: 20 DEGREES
EKG T AXIS: 21 DEGREES
EKG VENTRICULAR RATE: 65 BPM

## 2021-06-20 PROCEDURE — 93010 ELECTROCARDIOGRAM REPORT: CPT | Performed by: INTERNAL MEDICINE

## 2021-08-19 ENCOUNTER — TELEPHONE (OUTPATIENT)
Dept: VASCULAR SURGERY | Age: 66
End: 2021-08-19

## 2021-09-10 ENCOUNTER — HOSPITAL ENCOUNTER (OUTPATIENT)
Dept: ULTRASOUND IMAGING | Age: 66
Discharge: HOME | End: 2021-09-12
Payer: MEDICARE

## 2021-09-10 DIAGNOSIS — I65.23 CAROTID STENOSIS, ASYMPTOMATIC, BILATERAL: ICD-10-CM

## 2021-09-10 PROCEDURE — 93880 EXTRACRANIAL BILAT STUDY: CPT

## 2021-09-10 PROCEDURE — 93880 EXTRACRANIAL BILAT STUDY: CPT | Performed by: RADIOLOGY

## 2021-09-14 ENCOUNTER — TELEPHONE (OUTPATIENT)
Dept: VASCULAR SURGERY | Age: 66
End: 2021-09-14

## 2021-09-14 NOTE — TELEPHONE ENCOUNTER
Message left for the patient regarding the carotid ultrasound, based upon my personal interpretation of the ultrasound, based upon the velocities as well as atherosclerotic plaque, 60 to 70% noted, bilaterally, the patient is asymptomatic, follow conservatively with a follow-up evaluation in 6 months and call immediately if any neurological symptoms explained, call the office if any questions

## 2021-10-29 ENCOUNTER — APPOINTMENT (OUTPATIENT)
Dept: GENERAL RADIOLOGY | Age: 66
DRG: 027 | End: 2021-10-29
Payer: MEDICARE

## 2021-10-29 ENCOUNTER — HOSPITAL ENCOUNTER (EMERGENCY)
Age: 66
Discharge: HOME | DRG: 027 | End: 2021-10-29
Attending: STUDENT IN AN ORGANIZED HEALTH CARE EDUCATION/TRAINING PROGRAM
Payer: MEDICARE

## 2021-10-29 VITALS
RESPIRATION RATE: 18 BRPM | TEMPERATURE: 98.2 F | SYSTOLIC BLOOD PRESSURE: 190 MMHG | DIASTOLIC BLOOD PRESSURE: 73 MMHG | OXYGEN SATURATION: 98 % | WEIGHT: 170 LBS | BODY MASS INDEX: 30.11 KG/M2 | HEART RATE: 60 BPM

## 2021-10-29 DIAGNOSIS — R03.0 ELEVATED BLOOD PRESSURE READING: ICD-10-CM

## 2021-10-29 DIAGNOSIS — R41.0 INTERMITTENT CONFUSION: Primary | ICD-10-CM

## 2021-10-29 LAB
ANION GAP SERPL CALCULATED.3IONS-SCNC: 9 MMOL/L (ref 7–16)
BACTERIA: ABNORMAL /HPF
BASOPHILS ABSOLUTE: 0.07 E9/L (ref 0–0.2)
BASOPHILS RELATIVE PERCENT: 0.9 % (ref 0–2)
BILIRUBIN URINE: NEGATIVE
BLOOD, URINE: NEGATIVE
BUN BLDV-MCNC: 14 MG/DL (ref 6–23)
CALCIUM SERPL-MCNC: 9.6 MG/DL (ref 8.6–10.2)
CHLORIDE BLD-SCNC: 104 MMOL/L (ref 98–107)
CLARITY: CLEAR
CO2: 28 MMOL/L (ref 22–29)
COLOR: YELLOW
CREAT SERPL-MCNC: 1 MG/DL (ref 0.5–1)
EOSINOPHILS ABSOLUTE: 0.18 E9/L (ref 0.05–0.5)
EOSINOPHILS RELATIVE PERCENT: 2.4 % (ref 0–6)
GFR AFRICAN AMERICAN: >60
GFR NON-AFRICAN AMERICAN: 55 ML/MIN/1.73
GLUCOSE BLD-MCNC: 110 MG/DL (ref 74–99)
GLUCOSE URINE: NEGATIVE MG/DL
HCT VFR BLD CALC: 41.1 % (ref 34–48)
HEMOGLOBIN: 13.8 G/DL (ref 11.5–15.5)
IMMATURE GRANULOCYTES #: 0.03 E9/L
IMMATURE GRANULOCYTES %: 0.4 % (ref 0–5)
KETONES, URINE: NEGATIVE MG/DL
LEUKOCYTE ESTERASE, URINE: NEGATIVE
LYMPHOCYTES ABSOLUTE: 2.52 E9/L (ref 1.5–4)
LYMPHOCYTES RELATIVE PERCENT: 34 % (ref 20–42)
MAGNESIUM: 2.3 MG/DL (ref 1.6–2.6)
MCH RBC QN AUTO: 30.8 PG (ref 26–35)
MCHC RBC AUTO-ENTMCNC: 33.6 % (ref 32–34.5)
MCV RBC AUTO: 91.7 FL (ref 80–99.9)
MONOCYTES ABSOLUTE: 0.61 E9/L (ref 0.1–0.95)
MONOCYTES RELATIVE PERCENT: 8.2 % (ref 2–12)
NEUTROPHILS ABSOLUTE: 4 E9/L (ref 1.8–7.3)
NEUTROPHILS RELATIVE PERCENT: 54.1 % (ref 43–80)
NITRITE, URINE: NEGATIVE
PDW BLD-RTO: 13.1 FL (ref 11.5–15)
PH UA: 6 (ref 5–9)
PLATELET # BLD: 303 E9/L (ref 130–450)
PMV BLD AUTO: 8.9 FL (ref 7–12)
POTASSIUM SERPL-SCNC: 4.5 MMOL/L (ref 3.5–5)
PROTEIN UA: NEGATIVE MG/DL
RBC # BLD: 4.48 E12/L (ref 3.5–5.5)
RBC UA: ABNORMAL /HPF (ref 0–2)
SODIUM BLD-SCNC: 141 MMOL/L (ref 132–146)
SPECIFIC GRAVITY UA: 1.01 (ref 1–1.03)
TROPONIN, HIGH SENSITIVITY: 7 NG/L (ref 0–9)
UROBILINOGEN, URINE: 0.2 E.U./DL
WBC # BLD: 7.4 E9/L (ref 4.5–11.5)
WBC UA: ABNORMAL /HPF (ref 0–5)

## 2021-10-29 PROCEDURE — 36415 COLL VENOUS BLD VENIPUNCTURE: CPT

## 2021-10-29 PROCEDURE — 83735 ASSAY OF MAGNESIUM: CPT

## 2021-10-29 PROCEDURE — 84484 ASSAY OF TROPONIN QUANT: CPT

## 2021-10-29 PROCEDURE — 71045 X-RAY EXAM CHEST 1 VIEW: CPT

## 2021-10-29 PROCEDURE — 99283 EMERGENCY DEPT VISIT LOW MDM: CPT

## 2021-10-29 PROCEDURE — 81001 URINALYSIS AUTO W/SCOPE: CPT

## 2021-10-29 PROCEDURE — 85025 COMPLETE CBC W/AUTO DIFF WBC: CPT

## 2021-10-29 PROCEDURE — 93005 ELECTROCARDIOGRAM TRACING: CPT | Performed by: STUDENT IN AN ORGANIZED HEALTH CARE EDUCATION/TRAINING PROGRAM

## 2021-10-29 PROCEDURE — 80048 BASIC METABOLIC PNL TOTAL CA: CPT

## 2021-10-29 PROCEDURE — 70450 CT HEAD/BRAIN W/O DYE: CPT

## 2021-10-29 RX ORDER — HYDROCHLOROTHIAZIDE 25 MG/1
25 TABLET ORAL DAILY
Qty: 30 TABLET | Refills: 0 | Status: SHIPPED | OUTPATIENT
Start: 2021-10-29 | End: 2021-11-23 | Stop reason: SDUPTHER

## 2021-10-29 RX ORDER — LISINOPRIL 20 MG/1
40 TABLET ORAL DAILY
Qty: 30 TABLET | Refills: 0 | Status: SHIPPED | OUTPATIENT
Start: 2021-10-29 | End: 2021-11-23 | Stop reason: SDUPTHER

## 2021-10-29 NOTE — ED NOTES
Pt given discharge instructions with daughter at bedside, MD aware of high b/p and pt informed to  b/p meds from assigned pharmacy and take meds today as prescribed.   Pt also informed to call neurologist and [facility] physicians to schedule follow up appointments     Casandra Burkitt, RN  10/29/21 0547
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
## 2021-10-30 LAB
EKG ATRIAL RATE: 49 BPM
EKG P AXIS: -4 DEGREES
EKG P-R INTERVAL: 194 MS
EKG Q-T INTERVAL: 440 MS
EKG QRS DURATION: 82 MS
EKG QTC CALCULATION (BAZETT): 397 MS
EKG R AXIS: 0 DEGREES
EKG T AXIS: 7 DEGREES
EKG VENTRICULAR RATE: 49 BPM

## 2021-10-30 PROCEDURE — 93010 ELECTROCARDIOGRAM REPORT: CPT | Performed by: INTERNAL MEDICINE

## 2021-10-31 ENCOUNTER — HOSPITAL ENCOUNTER (INPATIENT)
Age: 66
LOS: 5 days | Discharge: HOME | DRG: 027 | End: 2021-11-05
Attending: STUDENT IN AN ORGANIZED HEALTH CARE EDUCATION/TRAINING PROGRAM | Admitting: FAMILY MEDICINE
Payer: MEDICARE

## 2021-10-31 ENCOUNTER — APPOINTMENT (OUTPATIENT)
Dept: CT IMAGING | Age: 66
DRG: 027 | End: 2021-10-31
Payer: MEDICARE

## 2021-10-31 DIAGNOSIS — I63.9 ACUTE CVA (CEREBROVASCULAR ACCIDENT) (HCC): Primary | ICD-10-CM

## 2021-10-31 DIAGNOSIS — G89.18 ACUTE POSTOPERATIVE PAIN: ICD-10-CM

## 2021-10-31 DIAGNOSIS — I10 ESSENTIAL HYPERTENSION: ICD-10-CM

## 2021-10-31 LAB
ALBUMIN SERPL-MCNC: 4.2 G/DL (ref 3.5–5.2)
ALP BLD-CCNC: 87 U/L (ref 35–104)
ALT SERPL-CCNC: 12 U/L (ref 0–32)
ANION GAP SERPL CALCULATED.3IONS-SCNC: 13 MMOL/L (ref 7–16)
AST SERPL-CCNC: 17 U/L (ref 0–31)
BASOPHILS ABSOLUTE: 0.07 E9/L (ref 0–0.2)
BASOPHILS RELATIVE PERCENT: 0.7 % (ref 0–2)
BILIRUB SERPL-MCNC: 0.4 MG/DL (ref 0–1.2)
BUN BLDV-MCNC: 15 MG/DL (ref 6–23)
CALCIUM SERPL-MCNC: 9.4 MG/DL (ref 8.6–10.2)
CHLORIDE BLD-SCNC: 97 MMOL/L (ref 98–107)
CHP ED QC CHECK: NORMAL
CO2: 22 MMOL/L (ref 22–29)
CREAT SERPL-MCNC: 1 MG/DL (ref 0.5–1)
EOSINOPHILS ABSOLUTE: 0.17 E9/L (ref 0.05–0.5)
EOSINOPHILS RELATIVE PERCENT: 1.8 % (ref 0–6)
ETHANOL: <10 MG/DL (ref 0–0.08)
GFR AFRICAN AMERICAN: >60
GFR NON-AFRICAN AMERICAN: 55 ML/MIN/1.73
GLUCOSE BLD-MCNC: 96 MG/DL
GLUCOSE BLD-MCNC: 97 MG/DL (ref 74–99)
HCT VFR BLD CALC: 43 % (ref 34–48)
HEMOGLOBIN: 14.5 G/DL (ref 11.5–15.5)
IMMATURE GRANULOCYTES #: 0.04 E9/L
IMMATURE GRANULOCYTES %: 0.4 % (ref 0–5)
LYMPHOCYTES ABSOLUTE: 2.63 E9/L (ref 1.5–4)
LYMPHOCYTES RELATIVE PERCENT: 27.4 % (ref 20–42)
MAGNESIUM: 1.8 MG/DL (ref 1.6–2.6)
MCH RBC QN AUTO: 30.2 PG (ref 26–35)
MCHC RBC AUTO-ENTMCNC: 33.7 % (ref 32–34.5)
MCV RBC AUTO: 89.6 FL (ref 80–99.9)
METER GLUCOSE: 96 MG/DL (ref 74–99)
MONOCYTES ABSOLUTE: 0.71 E9/L (ref 0.1–0.95)
MONOCYTES RELATIVE PERCENT: 7.4 % (ref 2–12)
NEUTROPHILS ABSOLUTE: 5.97 E9/L (ref 1.8–7.3)
NEUTROPHILS RELATIVE PERCENT: 62.3 % (ref 43–80)
PDW BLD-RTO: 12.8 FL (ref 11.5–15)
PLATELET # BLD: 308 E9/L (ref 130–450)
PMV BLD AUTO: 8.7 FL (ref 7–12)
POTASSIUM SERPL-SCNC: 3.9 MMOL/L (ref 3.5–5)
RBC # BLD: 4.8 E12/L (ref 3.5–5.5)
SODIUM BLD-SCNC: 132 MMOL/L (ref 132–146)
TOTAL PROTEIN: 6.4 G/DL (ref 6.4–8.3)
TROPONIN, HIGH SENSITIVITY: 7 NG/L (ref 0–9)
TSH SERPL DL<=0.05 MIU/L-ACNC: 2.44 UIU/ML (ref 0.27–4.2)
WBC # BLD: 9.6 E9/L (ref 4.5–11.5)

## 2021-10-31 PROCEDURE — 93005 ELECTROCARDIOGRAM TRACING: CPT | Performed by: PHYSICIAN ASSISTANT

## 2021-10-31 PROCEDURE — 83735 ASSAY OF MAGNESIUM: CPT

## 2021-10-31 PROCEDURE — 36415 COLL VENOUS BLD VENIPUNCTURE: CPT

## 2021-10-31 PROCEDURE — 99283 EMERGENCY DEPT VISIT LOW MDM: CPT

## 2021-10-31 PROCEDURE — 85025 COMPLETE CBC W/AUTO DIFF WBC: CPT

## 2021-10-31 PROCEDURE — 82077 ASSAY SPEC XCP UR&BREATH IA: CPT

## 2021-10-31 PROCEDURE — 84443 ASSAY THYROID STIM HORMONE: CPT

## 2021-10-31 PROCEDURE — 6370000000 HC RX 637 (ALT 250 FOR IP): Performed by: STUDENT IN AN ORGANIZED HEALTH CARE EDUCATION/TRAINING PROGRAM

## 2021-10-31 PROCEDURE — 82962 GLUCOSE BLOOD TEST: CPT

## 2021-10-31 PROCEDURE — 70450 CT HEAD/BRAIN W/O DYE: CPT

## 2021-10-31 PROCEDURE — 2060000000 HC ICU INTERMEDIATE R&B

## 2021-10-31 PROCEDURE — 84484 ASSAY OF TROPONIN QUANT: CPT

## 2021-10-31 PROCEDURE — 80053 COMPREHEN METABOLIC PANEL: CPT

## 2021-10-31 RX ORDER — ONDANSETRON 2 MG/ML
4 INJECTION INTRAMUSCULAR; INTRAVENOUS EVERY 6 HOURS PRN
Status: DISCONTINUED | OUTPATIENT
Start: 2021-10-31 | End: 2021-11-05 | Stop reason: HOSPADM

## 2021-10-31 RX ORDER — GABAPENTIN 300 MG/1
300 CAPSULE ORAL 3 TIMES DAILY
Status: DISCONTINUED | OUTPATIENT
Start: 2021-10-31 | End: 2021-11-05 | Stop reason: HOSPADM

## 2021-10-31 RX ORDER — CLOPIDOGREL 300 MG/1
300 TABLET, FILM COATED ORAL ONCE
Status: COMPLETED | OUTPATIENT
Start: 2021-10-31 | End: 2021-10-31

## 2021-10-31 RX ORDER — CLOPIDOGREL BISULFATE 75 MG/1
75 TABLET ORAL DAILY
Status: DISCONTINUED | OUTPATIENT
Start: 2021-11-01 | End: 2021-11-04

## 2021-10-31 RX ORDER — HYDROCHLOROTHIAZIDE 25 MG/1
25 TABLET ORAL DAILY
Status: DISCONTINUED | OUTPATIENT
Start: 2021-11-01 | End: 2021-11-05 | Stop reason: HOSPADM

## 2021-10-31 RX ORDER — NICOTINE 21 MG/24HR
1 PATCH, TRANSDERMAL 24 HOURS TRANSDERMAL DAILY
Status: DISCONTINUED | OUTPATIENT
Start: 2021-11-01 | End: 2021-11-05 | Stop reason: HOSPADM

## 2021-10-31 RX ORDER — ATORVASTATIN CALCIUM 40 MG/1
80 TABLET, FILM COATED ORAL NIGHTLY
Status: DISCONTINUED | OUTPATIENT
Start: 2021-10-31 | End: 2021-11-05 | Stop reason: HOSPADM

## 2021-10-31 RX ORDER — LORAZEPAM 1 MG/1
1 TABLET ORAL EVERY 6 HOURS PRN
Status: DISCONTINUED | OUTPATIENT
Start: 2021-10-31 | End: 2021-11-05 | Stop reason: HOSPADM

## 2021-10-31 RX ORDER — LISINOPRIL 20 MG/1
40 TABLET ORAL DAILY
Status: DISCONTINUED | OUTPATIENT
Start: 2021-11-01 | End: 2021-11-05 | Stop reason: HOSPADM

## 2021-10-31 RX ORDER — ASPIRIN 81 MG/1
81 TABLET ORAL DAILY
Status: DISCONTINUED | OUTPATIENT
Start: 2021-11-01 | End: 2021-11-05 | Stop reason: HOSPADM

## 2021-10-31 RX ORDER — ACETAMINOPHEN 650 MG/1
650 SUPPOSITORY RECTAL EVERY 4 HOURS PRN
Status: DISCONTINUED | OUTPATIENT
Start: 2021-10-31 | End: 2021-11-04

## 2021-10-31 RX ORDER — POLYETHYLENE GLYCOL 3350 17 G/17G
17 POWDER, FOR SOLUTION ORAL DAILY PRN
Status: DISCONTINUED | OUTPATIENT
Start: 2021-10-31 | End: 2021-11-04

## 2021-10-31 RX ORDER — ACETAMINOPHEN 325 MG/1
650 TABLET ORAL EVERY 4 HOURS PRN
Status: DISCONTINUED | OUTPATIENT
Start: 2021-10-31 | End: 2021-11-04

## 2021-10-31 RX ORDER — ONDANSETRON 4 MG/1
4 TABLET, ORALLY DISINTEGRATING ORAL EVERY 8 HOURS PRN
Status: DISCONTINUED | OUTPATIENT
Start: 2021-10-31 | End: 2021-11-05 | Stop reason: HOSPADM

## 2021-10-31 RX ORDER — LABETALOL HYDROCHLORIDE 5 MG/ML
10 INJECTION, SOLUTION INTRAVENOUS EVERY 10 MIN PRN
Status: DISCONTINUED | OUTPATIENT
Start: 2021-10-31 | End: 2021-11-05 | Stop reason: HOSPADM

## 2021-10-31 RX ADMIN — CLOPIDOGREL BISULFATE 300 MG: 300 TABLET, FILM COATED ORAL at 15:59

## 2021-10-31 NOTE — ED PROVIDER NOTES
Department of Emergency Medicine   ED  Provider Note  Admit Date/RoomTime: 10/31/2021  3:28 PM  ED Room: [place] [facility]          History of Present Illness:  10/31/21, Time: 3:45 PM EDT  Chief Complaint   Patient presents with    Altered Mental Status     pt states she has been disoriented x1 week. pt states she was seen yesterday for the same. pt states she was trying to fill out her bills and was unable to          Marcia Rodriguez is a 74 y.o. female presenting to the ED for altered mental status, beginning one week ago. The complaint has been intermittent, moderate in severity, and worsened by nothing. The patient is a 77-year-old female with a history of hypertension who presents to the emergency department complaining of altered mental status. The patient symptoms were sudden in onset about a week ago and has been intermittent, moderate in severity, and nothing makes it better or worse. She states that she has felt \"out of it\" over the past several days. She has also had trouble with coordination and writing. She was seen here yesterday for similar symptoms and had a CT scan of her head which was negative at that time and was seen for her blood pressure. She states that she did start taking her blood pressure medications at home. She states that when she was discharged from here yesterday she was unable to write and was having trouble with coordination and forgetting things that she typically remembers. She denies any fall, trauma, lightheadedness, dizziness, syncope, chest pain, shortness of breath, numbness, tingling, unilateral weakness, dysuria, hematuria, recent hospitalization, recent illness, or other acute symptoms or concerns. States that she does not have a family doctor that she follows up with.     Review of Systems:   A complete review of systems was performed and pertinent positives and negatives are stated within HPI, all other systems reviewed and are negative.        --------------------------------------------- PAST HISTORY ---------------------------------------------  Past Medical History:  has a past medical history of Anxiety, Back pain, Bilateral carotid artery stenosis, Chronic back pain, Decreased dorsalis pedis pulse, Depression, Discoloration of skin of toe, GERD (gastroesophageal reflux disease), Headache(784.0), Hyperlipidemia, Hypertension, Left carotid stenosis, PVD (peripheral vascular disease) with claudication ([address] Thyroid disease, Tobacco dependence, Toe cyanosis, and Ulcer of great toe, left, limited to breakdown of skin ([address] Past Surgical History:  has a past surgical history that includes ablation of dysrhythmic focus; Hysterectomy; Cholecystectomy; Carpal tunnel release; and Foot surgery. Social History:  reports that she has been smoking cigarettes. She has been smoking about 0.50 packs per day. She has never used smokeless tobacco. She reports current alcohol use. She reports that she does not use drugs. Family History: family history includes Cancer in her mother; Stroke in her father. . Unless otherwise noted, family history is non contributory    The patients home medications have been reviewed. Allergies: Codeine and Demerol    I have reviewed the past medical history, past surgical history, social history, and family history    ---------------------------------------------------PHYSICAL EXAM--------------------------------------    Constitutional/General: Alert and oriented x3  Head: Normocephalic and atraumatic  Eyes:  EOMI, sclera non icteric  ENT: Oropharynx clear, handling secretions, no trismus, no asymmetry of the posterior oropharynx or uvular edema  Neck: Supple, full ROM, no stridor, no meningeal signs  Respiratory: Lungs clear to auscultation bilaterally, no wheezes, rales, or rhonchi. Not in respiratory distress  Cardiovascular:  Regular rate. Regular rhythm.  No murmurs, no gallops, no rubs Gastrointestinal:  Abdomen Soft, Non tender, Non distended. No rebound, guarding, or rigidity. No pulsatile masses. Musculoskeletal: Moves all extremities x 4. Warm and well perfused, no clubbing, no cyanosis, no edema. Capillary refill <3 seconds  Skin: skin warm and dry. No rashes. Neurologic: GCS 15, no focal deficits, symmetric strength 5/5 in the upper and lower extremities bilaterally, sensation is intact and equal in bilateral upper and lower extremities, no ataxia  Psychiatric: Normal Affect    -------------------------------------------------- RESULTS -------------------------------------------------  I have personally reviewed all laboratory and imaging results for this patient. Results are listed below.      LABS: (Lab results interpreted by me)  Results for orders placed or performed during the hospital encounter of 10/31/21   CBC auto differential   Result Value Ref Range    WBC 9.6 4.5 - 11.5 E9/L    RBC 4.80 3.50 - 5.50 E12/L    Hemoglobin 14.5 11.5 - 15.5 g/dL    Hematocrit 43.0 34.0 - 48.0 %    MCV 89.6 80.0 - 99.9 fL    MCH 30.2 26.0 - 35.0 pg    MCHC 33.7 32.0 - 34.5 %    RDW 12.8 11.5 - 15.0 fL    Platelets 741 367 - 131 E9/L    MPV 8.7 7.0 - 12.0 fL    Neutrophils % 62.3 43.0 - 80.0 %    Immature Granulocytes % 0.4 0.0 - 5.0 %    Lymphocytes % 27.4 20.0 - 42.0 %    Monocytes % 7.4 2.0 - 12.0 %    Eosinophils % 1.8 0.0 - 6.0 %    Basophils % 0.7 0.0 - 2.0 %    Neutrophils Absolute 5.97 1.80 - 7.30 E9/L    Immature Granulocytes # 0.04 E9/L    Lymphocytes Absolute 2.63 1.50 - 4.00 E9/L    Monocytes Absolute 0.71 0.10 - 0.95 E9/L    Eosinophils Absolute 0.17 0.05 - 0.50 E9/L    Basophils Absolute 0.07 0.00 - 0.20 E9/L   Comprehensive Metabolic Panel   Result Value Ref Range    Sodium 132 132 - 146 mmol/L    Potassium 3.9 3.5 - 5.0 mmol/L    Chloride 97 (L) 98 - 107 mmol/L    CO2 22 22 - 29 mmol/L    Anion Gap 13 7 - 16 mmol/L    Glucose 97 74 - 99 mg/dL    BUN 15 6 - 23 mg/dL    CREATININE 1.0 0.5 - 1.0 mg/dL    GFR Non-African American 55 >=60 mL/min/1.73    GFR African American >60     Calcium 9.4 8.6 - 10.2 mg/dL    Total Protein 6.4 6.4 - 8.3 g/dL    Albumin 4.2 3.5 - 5.2 g/dL    Total Bilirubin 0.4 0.0 - 1.2 mg/dL    Alkaline Phosphatase 87 35 - 104 U/L    ALT 12 0 - 32 U/L    AST 17 0 - 31 U/L   Magnesium   Result Value Ref Range    Magnesium 1.8 1.6 - 2.6 mg/dL   Troponin   Result Value Ref Range    Troponin, High Sensitivity 7 0 - 9 ng/L   TSH without Reflex   Result Value Ref Range    TSH 2.440 0.270 - 4.200 uIU/mL   Ethanol   Result Value Ref Range    Ethanol Lvl <10 mg/dL   POCT Glucose   Result Value Ref Range    Glucose 96 mg/dL    QC OK? ok    POCT Glucose   Result Value Ref Range    Meter Glucose 96 74 - 99 mg/dL   EKG 12 Lead   Result Value Ref Range    Ventricular Rate 81 BPM    Atrial Rate 81 BPM    P-R Interval 188 ms    QRS Duration 78 ms    Q-T Interval 386 ms    QTc Calculation (Bazett) 448 ms    P Axis 47 degrees    R Axis 33 degrees    T Axis 13 degrees   ,       RADIOLOGY:  Interpreted by Radiologist unless otherwise specified  CT HEAD WO CONTRAST   Final Result   Suspect small acute left posterior parietal acute infarct         Vascular carotid duplex bilateral    (Results Pending)         EKG Interpretation  Interpreted by emergency department physician, Dr. Oj Rhodes    EKG: This EKG is signed and interpreted by me.     Rate: 49  Rhythm: Sinus  Interpretation: Normal sinus rhythm, normal axis, no ossific ST changes throughout, no acute elevations or depressions, intervals within normal limits, QTC is 397  Comparison: stable as compared to patient's most recent EKG     ------------------------- NURSING NOTES AND VITALS REVIEWED ---------------------------   The nursing notes within the ED encounter and vital signs as below have been reviewed by myself  BP (!) 154/78   Pulse 81   Temp 98 °F (36.7 °C)   Resp 16   SpO2 98%     Oxygen Saturation Interpretation: Normal    The patients available past medical records and past encounters were reviewed. ------------------------------ ED COURSE/MEDICAL DECISION MAKING----------------------  Medications   aspirin EC tablet 325 mg (has no administration in time range)   busPIRone (BUSPAR) tablet 15 mg (has no administration in time range)   gabapentin (NEURONTIN) capsule 300 mg (has no administration in time range)   hydroCHLOROthiazide (HYDRODIURIL) tablet 25 mg (has no administration in time range)   lisinopril (PRINIVIL;ZESTRIL) tablet 40 mg (has no administration in time range)   LORazepam (ATIVAN) tablet 1 mg (has no administration in time range)   metoprolol tartrate (LOPRESSOR) tablet 25 mg (has no administration in time range)   sertraline (ZOLOFT) tablet 50 mg (has no administration in time range)   ondansetron (ZOFRAN-ODT) disintegrating tablet 4 mg (has no administration in time range)     Or   ondansetron (ZOFRAN) injection 4 mg (has no administration in time range)   polyethylene glycol (GLYCOLAX) packet 17 g (has no administration in time range)   enoxaparin (LOVENOX) injection 40 mg (has no administration in time range)   perflutren lipid microspheres (DEFINITY) injection 1.65 mg (has no administration in time range)   acetaminophen (TYLENOL) tablet 650 mg (has no administration in time range)     Or   acetaminophen (TYLENOL) suppository 650 mg (has no administration in time range)   nicotine (NICODERM CQ) 21 MG/24HR 1 patch (has no administration in time range)   atorvastatin (LIPITOR) tablet 80 mg (has no administration in time range)   labetalol (NORMODYNE;TRANDATE) injection 10 mg (has no administration in time range)   clopidogrel (PLAVIX) tablet 75 mg (has no administration in time range)   aspirin EC tablet 81 mg (has no administration in time range)   clopidogrel (PLAVIX) tablet 300 mg (300 mg Oral Given 10/31/21 0822)           The cardiac monitor revealed NSR with a heart rate in the 90s as interpreted by me. The cardiac monitor was ordered secondary to the patient's altered mental status and to monitor the patient for dysrhythmia. CPT [id number]       I, Dr. Tran Quiñones, am the primary provider of record    Medical Decision Making:   The patient is a 57-year-old female who presents to the emergency department complaining of altered mental status. She is hypertensive on arrival with a blood pressure of 199/101. She is otherwise hemodynamically stable, nontoxic in appearance, no acute distress. There are no focal neurological deficits on examination. She does have episodes where she has trouble understanding and comprehending simple commands. Labs are reassuring and EKG does not show acute ischemia, high-sensitivity troponin was negative. CT head does show evidence of a small acute left parietal infarct. CT head from yesterday did not show these findings. However, the patient states her symptoms have been intermittent over the past week. She states that it did worsen yesterday when she left here. On my examination today there were no focal neurological deficits currently but she does have some issues with comprehension at times. She was given a dose of aspirin and Plavix in the emergency department and will be admitted for stroke work up. Oxygen Saturation Interpretation: 97 % on room air. Re-Evaluations:  ED Course as of Nov 01 0154   Evan Medina Oct 31, 2021   1614 Consulted with [facility] physicians, Dr. Kendall Saenz, hospitalist, who accepted for admission. [KG]   1621 Repeat blood pressure is 180/93 without any intervention. [KG]      ED Course User Index  [KG] Mic , DO       Patient was resting comfortably and in no acute distress on reassessment.       This patient's ED course included: a personal history and physicial examination, re-evaluation prior to disposition, multiple bedside re-evaluations, IV medications, cardiac monitoring, continuous pulse oximetry and complex medical decision making and emergency management    This patient has remained hemodynamically stable during their ED course. Consultations:    Spoke with Dr. Anay Barron (Medicine). Discussed case. They will admit this patient. Counseling: The emergency provider has spoken with the patient and discussed todays results, in addition to providing specific details for the plan of care and counseling regarding the diagnosis and prognosis. Questions are answered at this time and they are agreeable with the plan.       --------------------------------- IMPRESSION AND DISPOSITION ---------------------------------    IMPRESSION  1. Acute CVA (cerebrovascular accident) ([facility] [facility]    2. Essential hypertension        DISPOSITION  Disposition: Admit to telemetry  Patient condition is stable        NOTE: This report was transcribed using voice recognition software.  Every effort was made to ensure accuracy; however, inadvertent computerized transcription errors may be present       Jeffery Singh DO  11/01/21 0154

## 2021-10-31 NOTE — ED NOTES
FIRST PROVIDER CONTACT ASSESSMENT NOTE           Department of Emergency Medicine                 First Provider Note            10/31/21  12:14 PM EDT    Date of Encounter: No admission date for patient encounter. Patient Name: Tu Alfonso  : 1955  MRN: [id number]    Chief Complaint: Altered Mental Status (pt states she has been disoriented x1 week. pt states she was seen yesterday for the same. pt states she was trying to fill out her bills and was unable to)      History of Present Illness:   Tu Alfonso is a 74 y.o. female who presents to the ED for altered mental status. Patient states that this is been going on for 1 week. Patient states she was here yesterday and she was trying to fill out her bills and is unable to. Patient states I sat down \"and I could not write the words out they would not come out so I gave up. \"  Patient states she had a full work-up yesterday which was negative but it is still continuing and she is very concerned therefore came back in for further evaluation. Patient states she is very disoriented    Focused Physical Exam:  VS:    ED Triage Vitals   BP Temp Temp src Pulse Resp SpO2 Height Weight   10/31/21 1212 10/31/21 1212 -- 10/31/21 1208 10/31/21 1212 10/31/21 1208 -- --   (!) 199/101 98 °F (36.7 °C)  96 16 97 %          Physical Ex: Constitutional: Alert and non-toxic. Medical History:  has a past medical history of Anxiety, Back pain, Bilateral carotid artery stenosis, Chronic back pain, Decreased dorsalis pedis pulse, Depression, Discoloration of skin of toe, GERD (gastroesophageal reflux disease), Headache(784.0), Hyperlipidemia, Hypertension, Left carotid stenosis, PVD (peripheral vascular disease) with claudication ([address] Thyroid disease, Tobacco dependence, Toe cyanosis, and Ulcer of great toe, left, limited to breakdown of skin ([address] Surgical History:  has a past surgical history that includes ablation of dysrhythmic focus;  Hysterectomy; Cholecystectomy; Carpal tunnel release; and Foot surgery. Social History:  reports that she has been smoking cigarettes. She has been smoking about 0.50 packs per day. She has never used smokeless tobacco. She reports current alcohol use. She reports that she does not use drugs. Family History: family history includes Cancer in her mother; Stroke in her father.     Allergies: Codeine and Demerol     Initial Plan of Care: Initiate Treatment-Testing, Proceed toTreatment Area When Bed Available for ED Attending/MLP to Continue Care      ---END OF FIRST PROVIDER CONTACT ASSESSMENT NOTE---  Electronically signed by Faustino Olivarez PA-C   DD: 10/31/21       Faustino Olivarez PA-C  10/31/21 9055

## 2021-10-31 NOTE — H&P
Hospitalist History & Physical      PCP: No primary care provider on file. Date of Admission: 10/31/2021    Date of Service: Pt seen/examined on 10/31/2021 and is admitted to Inpatient with expected LOS greater than two midnights due to medical therapy. Chief Complaint:  had concerns including Altered Mental Status (pt states she has been disoriented x1 week. pt states she was seen yesterday for the same. pt states she was trying to fill out her bills and was unable to). History Of Present Illness:    Ms. Rock Harris, a 74y.o. year old female  who  has a past medical history of Anxiety, Back pain, Bilateral carotid artery stenosis, Chronic back pain, Decreased dorsalis pedis pulse, Depression, Discoloration of skin of toe, GERD (gastroesophageal reflux disease), Headache(784.0), Hyperlipidemia, Hypertension, Left carotid stenosis, PVD (peripheral vascular disease) with claudication ([address] Thyroid disease, Tobacco dependence, Toe cyanosis, and Ulcer of great toe, left, limited to breakdown of skin ([address] Pt presented with worsening forgetfulness, mental fog and intermittent episodes of confusion x 1-2 weeks. Pt was seen at [facility] ED on 10/29/21 for the same and discharged after work-up including CTH was negative for acute process. Pt states since going home her symptoms persisted and on 10/30/21 around 5pm (LKW) she was unable to complete paying her bills as she had an episode of confusion and forgot how to complete above routine task. Pt states she stayed at home and did not seek immediate medical attention after this; however upon awakening today with persistent symptoms she came back for further evaluation. Of note, pt also endorses not having anti-HTN and statins for the past week.  She states her above symptoms are associated with unsteady gait (denies nay falls or trauma), tinnitus and bilateral frontal, squeezing like headaches rated 6-7/10 in intensity, no exacerbating or alleviating factors. Pt denies any LOC, seizure like activity, chest pain, palpitations, abd pain, nausea, vomiting or diarrhea, flank pain, dysuria or numbness or tingling in her extremities. Pt is an active smoker, denies any alcohol or illicit drug use. In ED, pt noted to be HTN (200/100), but afebrile and saturating at 97% on RA. CMP was WNL, LFT WNL, Troponin negative x 1, TSH WNL, CBC WNL, UA negative for UTI (on 10/29). CTH suspicious for small acute left posterior parietal acute infarct. Pt admitted for further management. Past Medical History:        Diagnosis Date    Anxiety     Back pain     Bilateral carotid artery stenosis 3/12/2021    Chronic back pain     Decreased dorsalis pedis pulse 2/25/2021    Depression     Discoloration of skin of toe 2/25/2021    GERD (gastroesophageal reflux disease)     Headache(784.0)     Hyperlipidemia     Hypertension     Left carotid stenosis 2/25/2021    PVD (peripheral vascular disease) with claudication ([address] 6/3/2021    Thyroid disease     Tobacco dependence 2/25/2021    Toe cyanosis 2/25/2021    Ulcer of great toe, left, limited to breakdown of skin ([address] 2/25/2021       Past Surgical History:        Procedure Laterality Date    ABLATION OF DYSRHYTHMIC FOCUS      CARPAL TUNNEL RELEASE      CHOLECYSTECTOMY      FOOT SURGERY      right foot surgery 5 years ago    HYSTERECTOMY         Medications Prior to Admission:      Prior to Admission medications    Medication Sig Start Date End Date Taking? Authorizing Provider   lisinopril (PRINIVIL;ZESTRIL) 20 MG tablet Take 2 tablets by mouth daily 10/29/21   Mary French DO   hydroCHLOROthiazide (HYDRODIURIL) 25 MG tablet Take 1 tablet by mouth daily 10/29/21   Mary French DO   gabapentin (NEURONTIN) 300 MG capsule Take 300 mg by mouth 3 times daily.     Historical Provider, MD   cilostazol (PLETAL) 100 MG tablet Take 1 tablet by mouth 2 times daily  Patient not taking: Reported on 3/25/2021 3/12/21 4/11/21  Curtistine Po, MD   metoprolol tartrate (LOPRESSOR) 25 MG tablet Take 25 mg by mouth 2 times daily    Historical Provider, MD   busPIRone (BUSPAR) 15 MG tablet Take 15 mg by mouth 3 times daily    Historical Provider, MD   aspirin 81 MG EC tablet Take 81 mg by mouth daily    Historical Provider, MD   albuterol (PROAIR HFA) 108 (90 BASE) MCG/ACT inhaler Inhale 2 puffs into the lungs every 6 hours as needed for Wheezing for up to 7 days. 10/10/14 10/17/14  Abdifatah Sanabria MD   sertraline (ZOLOFT) 100 MG tablet Take 50 mg by mouth daily     Historical Provider, MD   lorazepam (ATIVAN) 0.5 MG tablet Take 1 mg by mouth every 6 hours as needed     Historical Provider, MD       Allergies:  Codeine and Demerol    Social History:    TOBACCO:   reports that she has been smoking cigarettes. She has been smoking about 0.50 packs per day. She has never used smokeless tobacco.  ETOH:   reports current alcohol use. Family History:    Reviewed in detail and negative for DM, CAD, Cancer, CVA. Positive as follows\"      Problem Relation Age of Onset    Cancer Mother         breast    Stroke Father        REVIEW OF SYSTEMS:   Pertinent positives as noted in the HPI. All other systems reviewed and negative. PHYSICAL EXAM:  BP (!) 180/93   Pulse 81   Temp 98 °F (36.7 °C)   Resp 16   SpO2 97%   General appearance: No apparent distress, appears stated age and cooperative. HEENT: Normal cephalic, atraumatic without obvious deformity. Pupils equal, round, and reactive to light. Extra ocular muscles intact. Conjunctivae/corneas clear. Neck: Supple, with full range of motion. No jugular venous distention. Trachea midline. Respiratory: CTA B/L. No rhonchi, Rales, Crackles  Cardiovascular: N S1/S2. No MRG  Abdomen: Soft, NTTP, + BS  Musculoskeletal: No clubbing, cyanosis, edema of bilateral lower extremities. Brisk capillary refill. Skin: Normal skin color. No rashes or lesions.   Neurologic: Neurovascularly intact without any focal sensory/motor deficits. Cranial nerves: II-XII intact, grossly non-focal.  Psychiatric: Alert and oriented, thought content appropriate, normal insight    Reviewed EKG and CXR personally      CBC:   Recent Labs     10/29/21  1412 10/31/21  1235   WBC 7.4 9.6   RBC 4.48 4.80   HGB 13.8 14.5   HCT 41.1 43.0   MCV 91.7 89.6   RDW 13.1 12.8    308     BMP:   Recent Labs     10/29/21  1412 10/31/21  1235    132   K 4.5 3.9    97*   CO2 28 22   BUN 14 15   CREATININE 1.0 1.0   MG 2.3 1.8     LFT:  Recent Labs     10/31/21  1235   PROT 6.4   ALKPHOS 87   ALT 12   AST 17   BILITOT 0.4     CE:  No results for input(s): Janny man in the last 72 hours. PT/INR: No results for input(s): INR, APTT in the last 72 hours. BNP: No results for input(s): BNP in the last 72 hours. ESR:   Lab Results   Component Value Date    SEDRATE 23 (H) 03/05/2021     CRP: No results found for: CRP  D Dimer: No results found for: DDIMER   Folate and B12: No results found for: [id number], No results found for: FOLATE  Lactic Acid: No results found for: LACTA  Thyroid Studies:   Lab Results   Component Value Date    TSH 2.440 10/31/2021       Oupatient labs:  Lab Results   Component Value Date    TSH 2.440 10/31/2021    INR 0.9 06/19/2021       Urinalysis:    Lab Results   Component Value Date    NITRU Negative 10/29/2021    WBCUA NONE 10/29/2021    BACTERIA RARE 10/29/2021    RBCUA NONE 10/29/2021    BLOODU Negative 10/29/2021    SPECGRAV 1.015 10/29/2021    GLUCOSEU Negative 10/29/2021       Imaging:  CT HEAD WO CONTRAST    Result Date: 10/31/2021  EXAMINATION: CT OF THE HEAD WITHOUT CONTRAST  10/31/2021 2:37 pm TECHNIQUE: CT of the head was performed without the administration of intravenous contrast. Dose modulation, iterative reconstruction, and/or weight based adjustment of the mA/kV was utilized to reduce the radiation dose to as low as reasonably achievable.  COMPARISON: Is is HISTORY: ORDERING SYSTEM PROVIDED HISTORY: Disoriented, sat down the right eyeballs and could not get the words to the mouth TECHNOLOGIST PROVIDED HISTORY: Has a \"code stroke\" or \"stroke alert\" been called? ->No Reason for exam:->Disoriented, sat down the right eyeballs and could not get the words to the mouth Decision Support Exception - unselect if not a suspected or confirmed emergency medical condition->Emergency Medical Condition (MA) What reading provider will be dictating this exam?->CRC FINDINGS: BRAIN/VENTRICLES: There is no acute intracranial hemorrhage, mass effect or midline shift. No abnormal extra-axial fluid collection. Within the left posterior parietal/occipital lobe there is hypodensity which is extending through the gray-white matter junction most consistent with acute infarct. No hemorrhagic components are observed. No evidence for mass effect. Supratentorial foci of decreased density appear chronic ORBITS: The visualized portion of the orbits demonstrate no acute abnormality. SINUSES: The visualized paranasal sinuses and mastoid air cells demonstrate no acute abnormality. SOFT TISSUES/SKULL:  No acute abnormality of the visualized skull or soft tissues. Suspect small acute left posterior parietal acute infarct     CT HEAD WO CONTRAST    Result Date: 10/29/2021  EXAMINATION: CT OF THE HEAD WITHOUT CONTRAST  10/29/2021 2:21 pm TECHNIQUE: CT of the head was performed without the administration of intravenous contrast. Dose modulation, iterative reconstruction, and/or weight based adjustment of the mA/kV was utilized to reduce the radiation dose to as low as reasonably achievable. COMPARISON: None. HISTORY: ORDERING SYSTEM PROVIDED HISTORY: intermittent confusion TECHNOLOGIST PROVIDED HISTORY: Reason for exam:->intermittent confusion Has a \"code stroke\" or \"stroke alert\" been called? ->No Decision Support Exception - unselect if not a suspected or confirmed emergency medical condition->Emergency Medical Condition (MA) FINDINGS: BRAIN/VENTRICLES: There is no acute intracranial hemorrhage, mass effect or midline shift. No abnormal extra-axial fluid collection. The gray-white differentiation is maintained without evidence of an acute infarct. There is no evidence of hydrocephalus. The ventricles, cisterns and sulci are prominent consistent with atrophy. There is decreased attenuation within the periventricular white matter consistent with periventricular leukomalacia. ORBITS: The visualized portion of the orbits demonstrate no acute abnormality. SINUSES: The visualized paranasal sinuses and mastoid air cells demonstrate no acute abnormality. SOFT TISSUES/SKULL:  No acute abnormality of the visualized skull or soft tissues. 1.  There is no acute intracranial abnormality. Specifically, there is no intracranial hemorrhage. 2. Atrophy and periventricular leukomalacia, . XR CHEST PORTABLE    Result Date: 10/29/2021  EXAMINATION: ONE XRAY VIEW OF THE CHEST 10/29/2021 2:21 pm COMPARISON: 06/19/2021. HISTORY: ORDERING SYSTEM PROVIDED HISTORY: altered mentation, mild cough occasional concern for pna TECHNOLOGIST PROVIDED HISTORY: Reason for exam:->altered mentation, mild cough occasional concern for pna FINDINGS: The cardiac silhouette is normal in size. The pulmonary vasculature is within normal limits. No pulmonary consolidation or collapse is identified. No pneumothorax or pleural effusion is seen. No acute cardiopulmonary disease. ASSESSMENT:    Principal Problem:    Acute CVA (cerebrovascular accident) ([address]  Active Problems:    Tobacco dependence    Bilateral carotid artery stenosis    PVD (peripheral vascular disease) with claudication (HCC)    HTN (hypertension)  Resolved Problems:    * No resolved hospital problems.  *    PLAN:  - LKW on 10/30/2021 at 5:00pm  - Pt out of window for tPA  - CTH suspicious for small acute left posterior parietal acute infarct  - Obtain Lipid panel, Hemoglobin A1c, RPR, Homocysteine, UDS and alcohol level  - Obtain Echocardiogram with bubble study  - Pt with EKG showing Sinus Behzad  - Maintain on tele  - If HR continues to vary will obtain EP consult  - Pt likely to benefit from holter monitor at discharge if no arrhthymias captured during inpatient stay to r/o PAF  - Neurology consult  - TTE with bubble  - NIHSS  - DAPT with ASA and Plavix  - Start Lipitor 80, decrease to 40 in 4 days; repeat lipid panel in 4 weeks  - Allow for permissive hypertension    <220/120 wo end-organ damage if no tPA  - Maintain normothermia with Tylenol PRN and early antibiotics if indicated  - Avoid hyperglycemia; monitor finger stick < 200  - Pt without any notable focal neurologic deficits at current  - Regular diet  - Prophylaxis of complication of stroke   Early mobilization with out of bed to chair and upgrade. PT/OT. Elevate head of bed and oral hygiene to prevent aspirations; Aspiration precaution   Speech and swallow evaluation ASAP to guide oral feeding   Rehab consult   Avoid indwelling bowman catheters   Depression prevention   Dietary consult to adjust lifestyle risk factors for repeated CVA    Diet: Regular Diet  Code Status: FULL CODE  Surrogate decision maker confirmed with patient:   Extended Emergency Contact Information  Primary Emergency Contact: Christina Cummins, [place]  Home Phone: [phone]  Mobile Phone: [phone]  Relation: Brother/Sister   needed? No  Secondary Emergency Contact: Kya Amato  Home Phone: [phone]  Mobile Phone: [phone]  Relation: Child  Preferred language: English   needed?  No    DVT Prophylaxis: [x]Lovenox []Heparin []PCD [] [address] []Encouraged ambulation  Disposition: []Med/Surg [x] Intermediate [] ICU/CCU  Admit status: [] Observation [x] Inpatient     +++++++++++++++++++++++++++++++++++++++++++++++++  Genoveva Tucker MD  [address] [address] - [address] [facility]  [place]  +++++++++++++++++++++++++++++++++++++++++++++++++  NOTE: This report was transcribed using voice recognition software. Every effort was made to ensure accuracy; however, inadvertent computerized transcription errors may be present.

## 2021-11-01 ENCOUNTER — APPOINTMENT (OUTPATIENT)
Dept: ULTRASOUND IMAGING | Age: 66
DRG: 027 | End: 2021-11-01
Payer: MEDICARE

## 2021-11-01 ENCOUNTER — APPOINTMENT (OUTPATIENT)
Dept: MRI IMAGING | Age: 66
DRG: 027 | End: 2021-11-01
Payer: MEDICARE

## 2021-11-01 ENCOUNTER — APPOINTMENT (OUTPATIENT)
Dept: CT IMAGING | Age: 66
DRG: 027 | End: 2021-11-01
Payer: MEDICARE

## 2021-11-01 LAB
CHOLESTEROL, TOTAL: 303 MG/DL (ref 0–199)
EKG ATRIAL RATE: 81 BPM
EKG P AXIS: 47 DEGREES
EKG P-R INTERVAL: 188 MS
EKG Q-T INTERVAL: 386 MS
EKG QRS DURATION: 78 MS
EKG QTC CALCULATION (BAZETT): 448 MS
EKG R AXIS: 33 DEGREES
EKG T AXIS: 13 DEGREES
EKG VENTRICULAR RATE: 81 BPM
HBA1C MFR BLD: 5.3 % (ref 4–5.6)
HCT VFR BLD CALC: 43.8 % (ref 34–48)
HDLC SERPL-MCNC: 47 MG/DL
HEMOGLOBIN: 14.9 G/DL (ref 11.5–15.5)
HOMOCYSTEINE: 17 UMOL/L (ref 0–15)
LDL CHOLESTEROL CALCULATED: 229 MG/DL (ref 0–99)
MCH RBC QN AUTO: 30.2 PG (ref 26–35)
MCHC RBC AUTO-ENTMCNC: 34 % (ref 32–34.5)
MCV RBC AUTO: 88.8 FL (ref 80–99.9)
PDW BLD-RTO: 12.9 FL (ref 11.5–15)
PLATELET # BLD: 335 E9/L (ref 130–450)
PMV BLD AUTO: 8.7 FL (ref 7–12)
RBC # BLD: 4.93 E12/L (ref 3.5–5.5)
TRIGL SERPL-MCNC: 133 MG/DL (ref 0–149)
VLDLC SERPL CALC-MCNC: 27 MG/DL
WBC # BLD: 7.7 E9/L (ref 4.5–11.5)

## 2021-11-01 PROCEDURE — 97165 OT EVAL LOW COMPLEX 30 MIN: CPT

## 2021-11-01 PROCEDURE — 93880 EXTRACRANIAL BILAT STUDY: CPT | Performed by: RADIOLOGY

## 2021-11-01 PROCEDURE — 86592 SYPHILIS TEST NON-TREP QUAL: CPT

## 2021-11-01 PROCEDURE — 6360000004 HC RX CONTRAST MEDICATION: Performed by: RADIOLOGY

## 2021-11-01 PROCEDURE — 85027 COMPLETE CBC AUTOMATED: CPT

## 2021-11-01 PROCEDURE — 6370000000 HC RX 637 (ALT 250 FOR IP): Performed by: STUDENT IN AN ORGANIZED HEALTH CARE EDUCATION/TRAINING PROGRAM

## 2021-11-01 PROCEDURE — 93880 EXTRACRANIAL BILAT STUDY: CPT

## 2021-11-01 PROCEDURE — 92523 SPEECH SOUND LANG COMPREHEN: CPT

## 2021-11-01 PROCEDURE — 6360000002 HC RX W HCPCS: Performed by: STUDENT IN AN ORGANIZED HEALTH CARE EDUCATION/TRAINING PROGRAM

## 2021-11-01 PROCEDURE — 70551 MRI BRAIN STEM W/O DYE: CPT

## 2021-11-01 PROCEDURE — 2060000000 HC ICU INTERMEDIATE R&B

## 2021-11-01 PROCEDURE — 36415 COLL VENOUS BLD VENIPUNCTURE: CPT

## 2021-11-01 PROCEDURE — 83090 ASSAY OF HOMOCYSTEINE: CPT

## 2021-11-01 PROCEDURE — 70496 CT ANGIOGRAPHY HEAD: CPT

## 2021-11-01 PROCEDURE — 99223 1ST HOSP IP/OBS HIGH 75: CPT | Performed by: PSYCHIATRY & NEUROLOGY

## 2021-11-01 PROCEDURE — 70498 CT ANGIOGRAPHY NECK: CPT

## 2021-11-01 PROCEDURE — 80061 LIPID PANEL: CPT

## 2021-11-01 PROCEDURE — 83036 HEMOGLOBIN GLYCOSYLATED A1C: CPT

## 2021-11-01 RX ADMIN — METOPROLOL TARTRATE 25 MG: 25 TABLET, FILM COATED ORAL at 20:44

## 2021-11-01 RX ADMIN — IOPAMIDOL 75 ML: 755 INJECTION, SOLUTION INTRAVENOUS at 18:16

## 2021-11-01 RX ADMIN — BUSPIRONE HYDROCHLORIDE 15 MG: 10 TABLET ORAL at 20:44

## 2021-11-01 RX ADMIN — CLOPIDOGREL BISULFATE 75 MG: 75 TABLET ORAL at 11:31

## 2021-11-01 RX ADMIN — METOPROLOL TARTRATE 25 MG: 25 TABLET, FILM COATED ORAL at 11:32

## 2021-11-01 RX ADMIN — BUSPIRONE HYDROCHLORIDE 15 MG: 10 TABLET ORAL at 16:15

## 2021-11-01 RX ADMIN — BUSPIRONE HYDROCHLORIDE 15 MG: 10 TABLET ORAL at 11:45

## 2021-11-01 RX ADMIN — ENOXAPARIN SODIUM 40 MG: 40 INJECTION SUBCUTANEOUS at 11:33

## 2021-11-01 NOTE — PROGRESS NOTES
4/4; Follows 2 step directions   Memory:  good    Sequencing:  good    Problem solving:  good    Judgement/safety:  good      Functional Assessment:   Initial Eval Status  Date: 11/1/21   Feeding Independent    Grooming Independent    UB Dressing Independent    LB Dressing Independent    Bathing Independent   Toileting Independent    Bed Mobility  Supine to sit: Independent   Sit to supine: Independent    Functional Transfers Sit to stand: Independent   Stand to sit: Independent   Stand pivot: Independent    Functional Mobility Indep with no AD   Balance Sitting:     Static:  wfl    Dynamic:wfl  Standing: wfl   Activity Tolerance wfl   Visual/  Perceptual Glasses: yes  Perception: mild dysmetria noted. Hand dominance: R  UE ROM: RUE:  WFL  LUE:  WFL  Strength: RUE: grossly 3+/5 LUE: grossly 4+/5   Strength: B WFL  Fine Motor Coordination:  B WFL    Hearing: WFL  Sensation:  No c/o numbness or tingling  Tone:  WFL  Edema: None noted                            Comments/Treatment: Upon arrival, patient in bed. .Pt demonstrated mild dysmetria in RUE (finger to nose test) Pt demonstrating independence with ADLs/mobility and good understanding of education and safety techniques. At end of session, patient sitting EOB, with call light and phone within reach, all lines and tubes intact. Recommend d/c from OT d/t no acute skilled needs at this time. Evaluation time includes thorough review of current medical information, gathering information on past medical & social history & PLOF, completion of standardized testing, informal observation of tasks, consultation with other medical professions/disciplines, assessment of data & development of POC/goals.      Evaluation Complexity: Low    Time In: 9:50  Time Out: 10:08      Min Units   OT Eval Low 08015  x     OT Eval Medium 51635      OT Eval High 50495       OT Re-Eval [id number]       Therapeutic Ex 91228       Therapeutic Activities 43218       ADL/Self Care [address] [facility]       Neuro Re-Ed [id number]       Non-Billable Time           Lindsay Hess, OTR/L #[id number]

## 2021-11-01 NOTE — PROGRESS NOTES
Hospitalist Progress Note      SYNOPSIS: Patient admitted on 10/31/2021 for Acute left posterior parietal acute infarct    SUBJECTIVE:  Patient seen and examined at bedside in NAD. Pt still endorsing difficulty finding words and performing tasks (like texting). Pt denies any headaches, LOC, tinnitus, changes in smell or taste, dysphagia, chest pain, palpitations, SOB. Records reviewed. Stable overnight. No other overnight issues reported. Temp (24hrs), Av °F (36.7 °C), Min:97.4 °F (36.3 °C), Max:98.7 °F (37.1 °C)    DIET: ADULT DIET; Regular  CODE: Full Code  No intake or output data in the 24 hours ending 21 1151    OBJECTIVE:    /71   Pulse 72   Temp 98.7 °F (37.1 °C) (Temporal)   Resp 18   Ht 5' 3\" (1.6 m)   Wt 170 lb (77.1 kg)   SpO2 96%   BMI 30.11 kg/m²     General appearance: No apparent distress, appears stated age and cooperative. HEENT:  Conjunctivae/corneas clear. Neck: Supple. No jugular venous distention. Respiratory: Clear to auscultation bilaterally, normal respiratory effort  Cardiovascular: Regular rate rhythm, normal S1-S2  Abdomen: Soft, nontender, nondistended  Musculoskeletal: No clubbing, cyanosis, no bilateral lower extremity edema. Brisk capillary refill. Skin:  No rashes  on visible skin  Neurologic: awake, alert and following commands     ASSESSMENT:  Principal Problem:    Acute CVA (cerebrovascular accident) ([address]  Active Problems:    Tobacco dependence    Bilateral carotid artery stenosis    PVD (peripheral vascular disease) with claudication (HCC)    HTN (hypertension)  Resolved Problems:    * No resolved hospital problems.  *     PLAN:  - LKW on 10/30/2021 at 5:00pm  - Pt out of window for tPA on admission  - CTH suspicious for small acute left posterior parietal acute infarct  - Lipid panel, Hemoglobin A1c, and alcohol level reviewed  - F/U RPR, Homocysteine\  - Obtain Echocardiogram with bubble study  - Pt with EKG showing Sinus Pool Tsering  - Maintain on tele  - If HR continues to vary will obtain EP consult  - Pt likely to benefit from holter monitor at discharge if no arrhthymias captured during inpatient stay to r/o PAF  - Neurology consult  - TTE with bubble  - NIHSS  - DAPT with ASA and Plavix  - Cont on Lipitor 80, decrease to 40 in 4 days; repeat lipid panel in 4 weeks  - BP Control (SBP: 140-160)  - Maintain normothermia with Tylenol PRN and early antibiotics if indicated  - Avoid hyperglycemia; monitor finger stick < 200  - Pt without any notable focal neurologic deficits at current  - Regular diet  - Prophylaxis of complication of stroke              Early mobilization with out of bed to chair and upgrade. PT/OT.               Elevate head of bed and oral hygiene to prevent aspirations; Aspiration precaution              Speech and swallow evaluation ASAP to guide oral feeding              Rehab consult              Avoid indwelling bowman catheters              Depression prevention              Dietary consult to adjust lifestyle risk factors for repeated CVA    DISPOSITION: intermediate    Medications:  REVIEWED DAILY    Infusion Medications   Scheduled Medications    aspirin  325 mg Oral Once    busPIRone  15 mg Oral TID    gabapentin  300 mg Oral TID    hydroCHLOROthiazide  25 mg Oral Daily    lisinopril  40 mg Oral Daily    metoprolol tartrate  25 mg Oral BID    sertraline  50 mg Oral Daily    enoxaparin  40 mg SubCUTAneous Daily    nicotine  1 patch TransDERmal Daily    atorvastatin  80 mg Oral Nightly    clopidogrel  75 mg Oral Daily    aspirin  81 mg Oral Daily     PRN Meds: LORazepam, ondansetron **OR** ondansetron, polyethylene glycol, perflutren lipid microspheres, acetaminophen **OR** acetaminophen, labetalol    Labs:     Recent Labs     10/29/21  1412 10/31/21  1235 11/01/21  1018   WBC 7.4 9.6 7.7   HGB 13.8 14.5 14.9   HCT 41.1 43.0 43.8    308 335       Recent Labs     10/29/21  1412 10/31/21  1235    132 K 4.5 3.9    97*   CO2 28 22   BUN 14 15   CREATININE 1.0 1.0   CALCIUM 9.6 9.4       Recent Labs     10/31/21  1235   PROT 6.4   ALKPHOS 87   ALT 12   AST 17   BILITOT 0.4       No results for input(s): INR in the last 72 hours. No results for input(s): Yuliana Ramos in the last 72 hours. Chronic labs:    Lab Results   Component Value Date    CHOL 303 (H) 11/01/2021    TRIG 133 11/01/2021    HDL 47 11/01/2021    LDLCALC 229 (H) 11/01/2021    TSH 2.440 10/31/2021    INR 0.9 06/19/2021    LABA1C 5.3 11/01/2021       Radiology: REVIEWED DAILY    +++++++++++++++++++++++++++++++++++++++++++++++++  Mary Butler MD  [facility] - [address] [place] [address]  +++++++++++++++++++++++++++++++++++++++++++++++++  NOTE: This report was transcribed using voice recognition software. Every effort was made to ensure accuracy; however, inadvertent computerized transcription errors may be present.

## 2021-11-01 NOTE — PROGRESS NOTES
SPEECH/LANGUAGE PATHOLOGY  SPEECH/LANGUAGE/COGNITIVE EVALUATION   and PLAN OF CARE      PATIENT NAME:  Marcia Rodriguez  (female)     MRN:  [id number]    :  1955  (74 y.o.)  STATUS:  Inpatient: Room 8504/8504-A    TODAY'S DATE:  2021  REFERRING PROVIDER:    Samuel White   SPECIFIC PROVIDER ORDER: speech language pathology (SLP) eval and treat  Date of order:  10-31-21  REASON FOR REFERRAL: CVA  EVALUATING THERAPIST: ALEXIA Santos    ADMITTING DIAGNOSIS: Essential hypertension [I10]  Acute CVA (cerebrovascular accident) [facility]) [I63.9]    VISIT DIAGNOSIS:   Visit Diagnoses       Codes    Essential hypertension     I10           SPEECH THERAPY  PLAN OF CARE   The speech therapy  POC is established based on physician order, speech pathology diagnosis and results of clinical assessment     SPEECH PATHOLOGY DIAGNOSIS:    PT responded appropriately to all questions of this evaluation however reported difficulty with functional tasks    Conditions Requiring Skilled Therapeutic Intervention for speech, language and/or cognition  Not applicable     Specific Speech Therapy Interventions to Include:   Not applicable    Specific instructions for next treatment:    Not applicable    SHORT/LONG TERM GOALS  Further evaluation is recommended with a functional language/cognitive evaluation tool      Patient goals: Patient/family involved in developing goals and treatment plan:   Treatment goals discussed with Patient    The Patient understand(s) the diagnosis, prognosis and plan of care   The patient/family Agreed with above,     This plan may be re-evaluated and revised as warranted.         Rehabilitation Potential/Prognosis: not applicable                CLINICAL ASSESSMENT:  MOTOR SPEECH       Oral Peripheral Examination   Adequate lingual/labial strength     Parameters of Speech Production  Respiration:  Adequate for speech production  Articulation:  Within functional limits  Resonance:  Within functional limits  Quality:   Within functional limits  Pitch: Within functional limits  Intensity: Within functional limits  Fluency:  Intact  Prosody Intact    RECEPTIVE LANGUAGE    Comprehension of Yes/No Questions: Within functional limits    Process  Simple Verbal Commands:   Within functional limits  Process Intermediate Verbal Commands:   Within functional limits  Process Complex Verbal Commands:     Within functional limits    Comprehension of Conversation:      Within functional limits      EXPRESSIVE LANGUAGE     Serials: Functional    Imitation:  Words   Functional   Sentences Functional    Naming:  (Modality used:  Verbal)  Confrontation Naming  Functional  Functional Description  Functional  Response Naming: Functional    Conversation:      Conversation was within functional limits    COGNITION     Attention/Orientation  Attention: Sustained attention   Orientation:  Oriented to Person, Place, Date, Reason for hospitalization    Memory   Immediate Recall: Repeated 3/3    Delayed Recall:   Recalled  3/3  Long Term Recall:   Recalled Address, Birthdate, Age, and Family    Organization/Problem Solving/Reasoning   Verbal Sequencing:   Functional        Verbal Problem solving:   Functional          CLINICAL OBSERVATIONS NOTED DURING THE EVALUATION  Within functional limits                  EDUCATION:   The Speech Language Pathologist (SLP) completed education regarding results of evaluation and that intervention is not warranted at this time. Learner: Patient  Education: Reviewed results and recommendations of this evaluation  Evaluation of Education:  Verbalizes understanding    Evaluation Time includes thorough review of current medical information, gathering information on past medical history/social history and prior level of function, completion of standardized testing/informal observation of tasks, assessment of data and education on plan of care and goals.       CPT code:    46214  eval speech sound lang comprehension      The admitting diagnosis and active problem list, as listed below have been reviewed prior to initiation of this evaluation.         ACTIVE PROBLEM LIST:   Patient Active Problem List   Diagnosis    Radiculopathy, lumbar region    Spinal stenosis, lumbar region, without neurogenic claudication    Tobacco dependence    Toe cyanosis    Discoloration of skin of toe    Difficulty walking    Decreased dorsalis pedis pulse    Bilateral carotid artery stenosis    PVD (peripheral vascular disease) with claudication (HCC)    Acute CVA (cerebrovascular accident) ([facility] [address]    HTN (hypertension)

## 2021-11-01 NOTE — CONSULTS
[facility]  Neurology Consult    Date:  11/1/2021  Patient Name:  Francisca Thorne  YOB: 1955  MRN: [id number]     PCP:  No primary care provider on file. Referring:  No ref. provider found      Chief Complaint: AMSx 1 week     History obtained from: EMR    Luisa Laughlin is a 74 y.o. female right handed with PMHx of Anxiety, Back pain, bilateral carotid artery stenosis, chronic back pain, depression, GERD, Headache, HLD, HTN, PVD, Thyroid disease, ulcer of great toe ( left) who presented with CC of AMS X 1 week. Neurology has been consulted for Acute CVA. Reviewed history, physical and imaging. Concern for acute parietooccipital infarct on left side right frontal stroke in setting of multiple risk factors such as HTN, smoking, HLD uncontrolled, PVD. Plan  MRI Brain WO Contrast, CTA Head and Neck   Echo  ASA + Plavix for 21 days followed by ASA indefinitely  Optimization of risk factors such as BP control, A1c, LDL, smoking cessation  Neurology will continue to follow        History of Present Illness:  Francisca Thorne is a 74 y.o. female right  handed with PMHx of Anxiety, Back pain, bilateral carotid artery stenosis, chronic back pain, depression, GERD, Headache, HLD, HTN, PVD, Thyroid disease, ulcer of great toe ( left) who presented with CC of AMS X 1 week. AMS started a week ago. It has bee intermittent. Pt states she has felt out of it over past several days and has had trouble with confusion and headache that is posterior, constant without nausea or vomiting but with intermittent blurring of vision. Her PCP retired so she has been out of meds for 3 months. No weakness, LOC, chest pain, SOB, fever,chills, tingling, numbness. Seen in ED 1 day prior and CT scan was negative at the time. After discharge from ED, she was unable to write and had difficulty with coordination, memory. She then came back for further evaluation.  On this admission, she had /101 on arrival. No aphasia, LOC, falls, Shaking movements of limbs. She said there are associated symptoms of unsteady gait, tinnitus , bilateral frontal headaches. No deficits on exam. CT head did show evidence of small acute left parietal infarct. ASA + Plavix was given and she was admited for stroke work up. She was pleasant and cooperative at the time of interview today.            Review of Systems:  Constitutional  Weight loss: no  Fever: no    Eyes  Double Vision: yes -   Visions loss: no    Ears, Nose, Mouth, and Throat  Difficulty swallowing: no    Cardiovascular  Chest Pain: no    Respiratory  Shortness of Breath: no    Gastrointestinal  Abdominal Pain: no    Genitourinary  Difficulty with Urination: no    Integumentary  Rash: no    Musculoskeletal  Back Pain: no    Neurological  Headaches: yes - bilateral,occipital  Weakness: no  Numbness: no  Seizures: no  Difficulty with Memory: yes - as above    Psychiatric  Anxiety: no  Depression: no  Other mental health issues: no      Medical History:   Past Medical History:   Diagnosis Date    Anxiety     Back pain     Bilateral carotid artery stenosis 3/12/2021    Chronic back pain     Decreased dorsalis pedis pulse 2/25/2021    Depression     Discoloration of skin of toe 2/25/2021    GERD (gastroesophageal reflux disease)     Headache(784.0)     Hyperlipidemia     Hypertension     Left carotid stenosis 2/25/2021    PVD (peripheral vascular disease) with claudication ([address] 6/3/2021    Thyroid disease     Tobacco dependence 2/25/2021    Toe cyanosis 2/25/2021    Ulcer of great toe, left, limited to breakdown of skin ([address] 2/25/2021        Surgical History:   Past Surgical History:   Procedure Laterality Date    ABLATION OF DYSRHYTHMIC FOCUS      CARPAL TUNNEL RELEASE      CHOLECYSTECTOMY      FOOT SURGERY      right foot surgery 5 years ago    HYSTERECTOMY          Family History:   Family History   Problem Relation Age of Onset    Cancer Mother breast    Stroke Father            Social History:  Social History     Tobacco Use    Smoking status: Current Every Day Smoker     Packs/day: 0.50     Types: Cigarettes    Smokeless tobacco: Never Used   Vaping Use    Vaping Use: Never used   Substance Use Topics    Alcohol use: Yes     Comment: social. rare    Drug use: No        Current Medications:      Current Facility-Administered Medications   Medication Dose Route Frequency Provider Last Rate Last Admin    aspirin EC tablet 325 mg  325 mg Oral Once Minesh Red MD        busPIRone (BUSPAR) tablet 15 mg  15 mg Oral TID Minesh Red MD        gabapentin (NEURONTIN) capsule 300 mg  300 mg Oral TID Minesh Red MD        hydroCHLOROthiazide (HYDRODIURIL) tablet 25 mg  25 mg Oral Daily Minesh Red MD        lisinopril (PRINIVIL;ZESTRIL) tablet 40 mg  40 mg Oral Daily Minesh Red MD        LORazepam (ATIVAN) tablet 1 mg  1 mg Oral Q6H PRN Minesh Red MD        metoprolol tartrate (LOPRESSOR) tablet 25 mg  25 mg Oral BID Minesh Red MD        sertraline (ZOLOFT) tablet 50 mg  50 mg Oral Daily Minesh Red MD        ondansetron (ZOFRAN-ODT) disintegrating tablet 4 mg  4 mg Oral Q8H PRN Minesh Red MD        Or    ondansetron (ZOFRAN) injection 4 mg  4 mg IntraVENous Q6H PRN Minesh Red MD        polyethylene glycol (GLYCOLAX) packet 17 g  17 g Oral Daily PRN Minesh Red MD        enoxaparin (LOVENOX) injection 40 mg  40 mg SubCUTAneous Daily Minesh Red MD        perflutren lipid microspheres (DEFINITY) injection 1.65 mg  1.5 mL IntraVENous ONCE PRN Minesh Red MD        acetaminophen (TYLENOL) tablet 650 mg  650 mg Oral Q4H PRN Minesh Red MD        Or    acetaminophen (TYLENOL) suppository 650 mg  650 mg Rectal Q4H PRN Minesh Red MD        nicotine (NICODERM CQ) 21 MG/24HR 1 patch  1 patch TransDERmal Daily Minesh Red MD        atorvastatin (LIPITOR) tablet 80 mg  80 mg Oral Nightly Minesh Red MD        labetalol (NORMODYNE;TRANDATE) injection 10 mg  10 mg IntraVENous Q10 Min PRN Mary Butler MD        clopidogrel (PLAVIX) tablet 75 mg  75 mg Oral Daily Mary Butler MD        aspirin EC tablet 81 mg  81 mg Oral Daily Mary Butler MD            Allergies: Allergies   Allergen Reactions    Codeine      I take vicodin, I had a reaction along time ago    Demerol         Physical Examination  Vitals   Vitals:    10/31/21 2040 10/31/21 2056 11/01/21 0632 11/01/21 0854   BP:  (!) 154/78 (!) 155/80 (!) 148/71   Pulse: 78 81 78 67   Resp: 18 16 16 20   Temp:    97.4 °F (36.3 °C)   TempSrc:    Temporal   SpO2: 97% 98% 98% 96%        General: Patient appears in no acute distress  HEENT: Normocephalic, atraumatic  Chest: Clear to auscultation bilaterally  Heart: Regular rate and rhythm, no murmurs appreciated  Extremities: No edema or cyanosis noted    Neurologic Examination    Mental Status  Alert, and oriented to person, place and time with normal speech and language. No evidence of aphasia during conversation. No evidence of memory impairment. Attention and concentration appeared normal.  Memory recall 2/3 objects after 5 mins, immediate memory intact    Cranial Nerves  II. Rt homonymous hemianopia versus temporal field vision loss  III, IV, VI: Pupils equally round and reactive to light, 3 to 2 mm bilaterally. EOMs: full, no nystagmus. V. Facial sensation intact to light touch bilaterally  VII: Facial movements symmetric and strong  VIII: Hearing intact to voice  IX,X: Palate elevates symmetrically.  No dysarthria  XI: Sternocleidomastoid and trapezius 5/5 bilaterally   XII: Tongue is midline    Motor     Right Left   Right Left   Deltoid 5 5  Hip Flexion 5 5   Biceps      5  5  Knee Extension 5 5   Triceps 5 5  Knee Flexion 5 5   Handgrip 5 5  Ankle Dorsiflexion 5 5       Ankle Plantarflexion 5 5     Tone: Normal in all four limbs    Bulk: Normal in all four limbs with no evidence of atrophy    Sensation  Light Touch: Intact distally in all four limbs  Pinprick: Intact distally in all four limbs  Vibration: Intact distally in all four limbs  Proprioception: Intact distally in all four limbs    Reflexes     Right Left   Biceps 2 2   Brachioradialis 2 2   Triceps 2 2   Patellar 2 2   Achilles 2 2   ankle clonus none none     Toes down going bilaterally. Coordination  Rapid alternating movements normal in bilateral upper extremities  Finger to nose testing normal on left but mild past pointing on right    Gait  Normal base, stride, and arm swing with casual gait. 2-3 steps to turn. Normal heel, toe and tandem gait. Labs  /101 on admission and now 148/71 mm Hg. Remainder of vitals stable. BMP , LFT, CBC WNL   Glucose 96, TSH 2.4    Imaging  CT Head suspect small acute parietal posterior infarct on left side    Other Testing Results  EKG NSR , incomplete RBBB        Electronically signed by: Esme Martinez MD, 11/1/2021 9:41 AM    I have reviewed the chief complaint and history of present illness and review of symptoms as well as the past medical/social/family history sections for this patient. I have examined this patient, and participated in the care of this patient. I have reviewed the pertinent clinical information including physical exam, labs, radiographic studies and the plan of care. This patient was seen in coordination and conjunction with the Internal Medicine Resident. I have modified the note as necessary to accurately reflect my findings and recommendations.

## 2021-11-01 NOTE — CARE COORDINATION
Care Coordination  The patient was admitted due to altered mental status. and has been disoriented x 1 week. . yerterday she tried to get her bills done to send out and was unable to. Per scan she has a small acute left posterior parietal acute infarct. Neurology on c.s plan JAYE. Pt is pending. Holly Prim is 24/24 the patient lives alone in a 2 story home no steps to enter. Her bed and bath are upstairs. The patient was independent pta. She has no DME. Her PCP is Dr Luigi Arteaga. Her Pharmacy is [facility] on [facility]. Per speech /cognitive eval Plan is home once she is medically stable.  I will follow

## 2021-11-02 PROBLEM — R23.0 TOE CYANOSIS: Status: RESOLVED | Noted: 2021-02-25 | Resolved: 2021-11-02

## 2021-11-02 PROBLEM — H53.40 VISUAL FIELD DEFECTS: Status: ACTIVE | Noted: 2021-11-02

## 2021-11-02 LAB
ALBUMIN SERPL-MCNC: 4.1 G/DL (ref 3.5–5.2)
ALP BLD-CCNC: 93 U/L (ref 35–104)
ALT SERPL-CCNC: 11 U/L (ref 0–32)
ANION GAP SERPL CALCULATED.3IONS-SCNC: 10 MMOL/L (ref 7–16)
AST SERPL-CCNC: 15 U/L (ref 0–31)
BASOPHILS ABSOLUTE: 0.08 E9/L (ref 0–0.2)
BASOPHILS RELATIVE PERCENT: 0.8 % (ref 0–2)
BILIRUB SERPL-MCNC: 0.3 MG/DL (ref 0–1.2)
BUN BLDV-MCNC: 32 MG/DL (ref 6–23)
CALCIUM SERPL-MCNC: 9.8 MG/DL (ref 8.6–10.2)
CHLORIDE BLD-SCNC: 102 MMOL/L (ref 98–107)
CO2: 26 MMOL/L (ref 22–29)
CREAT SERPL-MCNC: 1.3 MG/DL (ref 0.5–1)
EOSINOPHILS ABSOLUTE: 0.24 E9/L (ref 0.05–0.5)
EOSINOPHILS RELATIVE PERCENT: 2.5 % (ref 0–6)
GFR AFRICAN AMERICAN: 49
GFR NON-AFRICAN AMERICAN: 41 ML/MIN/1.73
GLUCOSE BLD-MCNC: 98 MG/DL (ref 74–99)
HCT VFR BLD CALC: 42 % (ref 34–48)
HEMOGLOBIN: 14 G/DL (ref 11.5–15.5)
IMMATURE GRANULOCYTES #: 0.05 E9/L
IMMATURE GRANULOCYTES %: 0.5 % (ref 0–5)
LV EF: 65 %
LVEF MODALITY: NORMAL
LYMPHOCYTES ABSOLUTE: 2.17 E9/L (ref 1.5–4)
LYMPHOCYTES RELATIVE PERCENT: 22.2 % (ref 20–42)
MCH RBC QN AUTO: 30.2 PG (ref 26–35)
MCHC RBC AUTO-ENTMCNC: 33.3 % (ref 32–34.5)
MCV RBC AUTO: 90.5 FL (ref 80–99.9)
MONOCYTES ABSOLUTE: 0.89 E9/L (ref 0.1–0.95)
MONOCYTES RELATIVE PERCENT: 9.1 % (ref 2–12)
NEUTROPHILS ABSOLUTE: 6.35 E9/L (ref 1.8–7.3)
NEUTROPHILS RELATIVE PERCENT: 64.9 % (ref 43–80)
PDW BLD-RTO: 13 FL (ref 11.5–15)
PLATELET # BLD: 365 E9/L (ref 130–450)
PMV BLD AUTO: 9 FL (ref 7–12)
POTASSIUM SERPL-SCNC: 3.7 MMOL/L (ref 3.5–5)
RBC # BLD: 4.64 E12/L (ref 3.5–5.5)
RPR: NORMAL
SODIUM BLD-SCNC: 138 MMOL/L (ref 132–146)
TOTAL PROTEIN: 6.8 G/DL (ref 6.4–8.3)
WBC # BLD: 9.8 E9/L (ref 4.5–11.5)

## 2021-11-02 PROCEDURE — 97530 THERAPEUTIC ACTIVITIES: CPT

## 2021-11-02 PROCEDURE — 97161 PT EVAL LOW COMPLEX 20 MIN: CPT

## 2021-11-02 PROCEDURE — 6360000002 HC RX W HCPCS: Performed by: STUDENT IN AN ORGANIZED HEALTH CARE EDUCATION/TRAINING PROGRAM

## 2021-11-02 PROCEDURE — 6370000000 HC RX 637 (ALT 250 FOR IP): Performed by: STUDENT IN AN ORGANIZED HEALTH CARE EDUCATION/TRAINING PROGRAM

## 2021-11-02 PROCEDURE — 99223 1ST HOSP IP/OBS HIGH 75: CPT | Performed by: SURGERY

## 2021-11-02 PROCEDURE — 2060000000 HC ICU INTERMEDIATE R&B

## 2021-11-02 PROCEDURE — 80053 COMPREHEN METABOLIC PANEL: CPT

## 2021-11-02 PROCEDURE — 6370000000 HC RX 637 (ALT 250 FOR IP): Performed by: FAMILY MEDICINE

## 2021-11-02 PROCEDURE — 93306 TTE W/DOPPLER COMPLETE: CPT

## 2021-11-02 PROCEDURE — 85025 COMPLETE CBC W/AUTO DIFF WBC: CPT

## 2021-11-02 PROCEDURE — 99232 SBSQ HOSP IP/OBS MODERATE 35: CPT | Performed by: NURSE PRACTITIONER

## 2021-11-02 PROCEDURE — 36415 COLL VENOUS BLD VENIPUNCTURE: CPT

## 2021-11-02 RX ADMIN — METOPROLOL TARTRATE 25 MG: 25 TABLET, FILM COATED ORAL at 08:43

## 2021-11-02 RX ADMIN — CLOPIDOGREL BISULFATE 75 MG: 75 TABLET ORAL at 08:43

## 2021-11-02 RX ADMIN — ENOXAPARIN SODIUM 40 MG: 40 INJECTION SUBCUTANEOUS at 08:47

## 2021-11-02 RX ADMIN — BUSPIRONE HYDROCHLORIDE 15 MG: 10 TABLET ORAL at 08:44

## 2021-11-02 RX ADMIN — BUSPIRONE HYDROCHLORIDE 15 MG: 10 TABLET ORAL at 20:06

## 2021-11-02 RX ADMIN — BUSPIRONE HYDROCHLORIDE 15 MG: 10 TABLET ORAL at 14:20

## 2021-11-02 RX ADMIN — LORAZEPAM 1 MG: 1 TABLET ORAL at 18:20

## 2021-11-02 ASSESSMENT — PAIN SCALES - GENERAL
PAINLEVEL_OUTOF10: 0

## 2021-11-02 NOTE — PROGRESS NOTES
Dawit Nunes is a 74 y.o. right handed female     Neurology following for stroke    PMH of Anxiety, Back pain, bilateral carotid artery stenosis, chronic back pain, depression, GERD, Headache, HLD, HTN, PVD, Thyroid disease, ulcer of great toe ( left)     Presented to ED with AMS which had been intermittent. She was confused and c/o of headache with intermittent blurred vision. CTH negative for acute findings. She was discharged and came back to ED due to HTN, un coordination and short term memory loss. CTH repeated and showed small acute left parietal infarct. DAPT was started. CUS showed stenosis bilaterally. MRI brain showed left occipital stroke extending up to left parietal lobe. She follows with Dr. Yasmany Martinez OP. Patient lying in bed with daughter at bedside. Discussed MRI brain results, stroke preventative measures. Addressed questions and concerns. Patient complains of blurry vision especially when reading. No chest pain or palpitations  No coughing or wheezing    No vertigo, lightheadedness or loss of consciousness  No falls, tripping or stumbling  No incontinence of bowels or bladder  No itching or bruising appreciated  No numbness, tingling or focal arm/leg weakness    ROS otherwise negative      Objective:     BP (!) 115/41   Pulse 53   Temp 98.1 °F (36.7 °C) (Temporal)   Resp 17   Ht 5' 3\" (1.6 m)   Wt 170 lb (77.1 kg)   SpO2 96%   BMI 30.11 kg/m²     General appearance: alert, appears stated age, cooperative and no distress  Head: normocephalic, without obvious abnormality, atraumatic  Eyes: conjunctivae/corneas clear  Neck: symmetrical, trachea midline   Lungs: clear to auscultation bilaterally  Extremities:  normal, atraumatic, no cyanosis or edema  Skin: color, texture, turgor normal---no rashes or lesions      Mental Status: Alert to self, time and place. Follows commands.     Appropriate attention/concentration  Intact fundus of knowledge        Speech: no dysarthria  Language: no aphasias     Cranial Nerves:  I: smell NA   II: visual acuity  NA   II: visual fields Right HH   II: pupils AUDRA   III,VII: ptosis None   III,IV,VI: extraocular muscles  Full ROM   V: mastication Normal   V: facial light touch sensation  Normal   V,VII: corneal reflex     VII: facial muscle function - upper  Normal   VII: facial muscle function - lower Normal   VIII: hearing Normal   IX: soft palate elevation  Normal   IX,X: gag reflex    XI: trapezius strength  5/5   XI: sternocleidomastoid strength 5/5   XI: neck extension strength  5/5   XII: tongue strength  Normal     Motor:  5/5 throughout  Normal bulk and tone  No drift   No abnormal movements    Sensory:  LT normal    Coordination:   FN, FFM and SCOTTY normal    Laboratory/Radiology:     CBC with Differential:    Lab Results   Component Value Date    WBC 9.8 11/02/2021    RBC 4.64 11/02/2021    HGB 14.0 11/02/2021    HCT 42.0 11/02/2021     11/02/2021    MCV 90.5 11/02/2021    MCH 30.2 11/02/2021    MCHC 33.3 11/02/2021    RDW 13.0 11/02/2021    LYMPHOPCT 22.2 11/02/2021    MONOPCT 9.1 11/02/2021    BASOPCT 0.8 11/02/2021    MONOSABS 0.89 11/02/2021    LYMPHSABS 2.17 11/02/2021    EOSABS 0.24 11/02/2021    BASOSABS 0.08 11/02/2021     CMP:    Lab Results   Component Value Date     11/02/2021    K 3.7 11/02/2021    K 3.9 06/19/2021     11/02/2021    CO2 26 11/02/2021    BUN 32 11/02/2021    CREATININE 1.3 11/02/2021    GFRAA 49 11/02/2021    LABGLOM 41 11/02/2021    GLUCOSE 98 11/02/2021    PROT 6.8 11/02/2021    LABALBU 4.1 11/02/2021    CALCIUM 9.8 11/02/2021    BILITOT 0.3 11/02/2021    ALKPHOS 93 11/02/2021    AST 15 11/02/2021    ALT 11 11/02/2021     Hepatic Function Panel:    Lab Results   Component Value Date    ALKPHOS 93 11/02/2021    ALT 11 11/02/2021    AST 15 11/02/2021    PROT 6.8 11/02/2021    BILITOT 0.3 11/02/2021    BILIDIR <0.2 10/10/2014    IBILI 0.1 10/10/2014    LABALBU 4.1 11/02/2021     HgBA1c: Lab Results   Component Value Date    LABA1C 5.3 11/01/2021     FLP:    Lab Results   Component Value Date    TRIG 133 11/01/2021    HDL 47 11/01/2021    LDLCALC 229 11/01/2021    LABVLDL 27 11/01/2021     CTH negative for acute findings. CTH repeated and showed small acute left parietal infarct. CUS showed stenosis bilaterally. MRI brain showed left occipital stroke extending up to left parietal lobe.      All labs and imaging studies reviewed independently today     Assessment:     Left occipital infarct likely due to left carotid stenosis  --- bilateral ICA's with significant stenosis  ---  and A1c 5.3  --- stroke risk factors include:  HTN, HLD, current everyday smoker   --- right HH advise no driving until cleared by optometry     Plan:     Echo pending  DAPT for 21 days then aspirin indefinitely   Statin  Stroke education  PT/OT/SLP  PCD's  Neurology to follow       Alejandra Gomes, APRN - CNP  2:51 PM  11/2/2021

## 2021-11-02 NOTE — PROGRESS NOTES
Hospitalist Progress Note      SYNOPSIS: Patient admitted on 10/31/2021 for Acute left posterior parietal acute infarct    SUBJECTIVE:  Patient seen and examined chart reviewed discussed with nursing staff. No overnight events reported. Patient still finding words. No weakness reported. Discussed with neurology team will consult vascular surgery for asymptomatic carotid artery stenosis  Patient slightly bradycardic  Stable overnight. No other overnight issues reported. Temp (24hrs), Av °F (36.7 °C), Min:97.2 °F (36.2 °C), Max:98.4 °F (36.9 °C)    DIET: ADULT DIET; Regular  CODE: Full Code    Intake/Output Summary (Last 24 hours) at 2021 1637  Last data filed at 2021 1515  Gross per 24 hour   Intake 780 ml   Output    Net 780 ml       OBJECTIVE:    BP (!) 142/52   Pulse 58   Temp 97.2 °F (36.2 °C) (Temporal)   Resp 20   Ht 5' 3\" (1.6 m)   Wt 170 lb (77.1 kg)   SpO2 99%   BMI 30.11 kg/m²     General appearance: No apparent distress, appears stated age and cooperative. HEENT:  Conjunctivae/corneas clear. Neck: Supple. No jugular venous distention. Respiratory: Clear to auscultation bilaterally, normal respiratory effort  Cardiovascular: Regular rate rhythm, normal S1-S2  Abdomen: Soft, nontender, nondistended  Musculoskeletal: No clubbing, cyanosis, no bilateral lower extremity edema. Brisk capillary refill. Skin:  No rashes  on visible skin  Neurologic: awake, alert and following commands     ASSESSMENT:  Principal Problem:    Acute CVA (cerebrovascular accident) ([address]  Active Problems:    Tobacco dependence    Bilateral carotid artery stenosis    PVD (peripheral vascular disease) with claudication (HCC)    HTN (hypertension)    Visual field defects  Resolved Problems:    * No resolved hospital problems.  *     PLAN:  - LKW on 10/30/2021 at 5:00pm  - Pt out of window for tPA on admission  - CTH suspicious for small acute left posterior parietal acute infarct  - Lipid panel, Hemoglobin A1c, and alcohol level reviewed  - F/U RPR, Homocysteine\  - Obtain Echocardiogram with bubble study  - Pt with EKG showing Sinus Behzad  - Maintain on tele  - If HR continues to vary will obtain EP consult  - Pt likely to benefit from holter monitor at discharge if no arrhthymias captured during inpatient stay to r/o PAF  - Neurology consult  - TTE with bubble  - NIHSS  - DAPT with ASA and Plavix  - Cont on Lipitor 80, decrease to 40 in 4 days; repeat lipid panel in 4 weeks  - BP Control (SBP: 140-160)  - Maintain normothermia with Tylenol PRN and early antibiotics if indicated  - Avoid hyperglycemia; monitor finger stick < 200  - Pt without any notable focal neurologic deficits at current  - Regular diet  - Prophylaxis of complication of stroke              Early mobilization with out of bed to chair and upgrade. PT/OT. Elevate head of bed and oral hygiene to prevent aspirations; Aspiration precaution              Speech and swallow evaluation ASAP to guide oral feeding              Rehab consult              Avoid indwelling bowman catheters              Depression prevention              Dietary consult to adjust lifestyle risk factors for repeated CVA  Echo pending. Vascular surgery consulted for symptomatic carotid stenosis  Asymptomatic bradycardia, will decrease metoprolol dose  Patient will need Holter monitor on discharge.   DISPOSITION: intermediate    Medications:  REVIEWED DAILY    Infusion Medications   Scheduled Medications    metoprolol tartrate  12.5 mg Oral BID    aspirin  325 mg Oral Once    busPIRone  15 mg Oral TID    gabapentin  300 mg Oral TID    hydroCHLOROthiazide  25 mg Oral Daily    lisinopril  40 mg Oral Daily    sertraline  50 mg Oral Daily    enoxaparin  40 mg SubCUTAneous Daily    nicotine  1 patch TransDERmal Daily    atorvastatin  80 mg Oral Nightly    clopidogrel  75 mg Oral Daily    aspirin  81 mg Oral Daily     PRN Meds: LORazepam, ondansetron **OR** ondansetron, polyethylene glycol, perflutren lipid microspheres, acetaminophen **OR** acetaminophen, labetalol    Labs:     Recent Labs     10/31/21  1235 11/01/21  1018 11/02/21  1050   WBC 9.6 7.7 9.8   HGB 14.5 14.9 14.0   HCT 43.0 43.8 42.0    335 365       Recent Labs     10/31/21  1235 11/02/21  1050    138   K 3.9 3.7   CL 97* 102   CO2 22 26   BUN 15 32*   CREATININE 1.0 1.3*   CALCIUM 9.4 9.8       Recent Labs     10/31/21  1235 11/02/21  1050   PROT 6.4 6.8   ALKPHOS 87 93   ALT 12 11   AST 17 15   BILITOT 0.4 0.3       No results for input(s): INR in the last 72 hours. No results for input(s): Brice Gey in the last 72 hours. Chronic labs:    Lab Results   Component Value Date    CHOL 303 (H) 11/01/2021    TRIG 133 11/01/2021    HDL 47 11/01/2021    LDLCALC 229 (H) 11/01/2021    TSH 2.440 10/31/2021    INR 0.9 06/19/2021    LABA1C 5.3 11/01/2021       Radiology: REVIEWED DAILY    +++++++++++++++++++++++++++++++++++++++++++++++++  charu pryor MD  [facility] - [address], [place]  +++++++++++++++++++++++++++++++++++++++++++++++++  NOTE: This report was transcribed using voice recognition software. Every effort was made to ensure accuracy; however, inadvertent computerized transcription errors may be present.

## 2021-11-02 NOTE — CONSULTS
Chief Complaint: Patient seen for evaluation of stroke and carotid stenosis on the left side      HPI: This patient, who is known to have bilateral carotid artery disease, approximately 61 to 70%, is being followed conservatively with every 6 months follow-up carotid ultrasound, noted, increased confusion, some disorientation, on and off for the last 1 week at least and noted by the family, also had trouble to fill out her bills, was not brought into the hospital, underwent evaluation and work-up, including MRI of the brain, CTA of the carotids, revealed evidence of bilateral carotid stenosis left more than the right, and multiple infarcts in the left side and vascular service and neurology service are consulted for further evaluation    Patient is slightly hard of hearing    Patient smokes about 1 pack/day    Patient denies any chest pain, palpitation or shortness of breath    Patient does give history of peripheral laceration, consistent with right Home numbers hemianopsia    Patient denies any focal lateralizing neurological symptoms like loss of speech, vision or loss of function of extremity since hospitalization    Patient can walk a few blocks slowly, has history of peripheral vascular disease, ankle-brachial is 0.77 on the left, based upon ankle-brachial  In March of this year and denies any symptoms of rest pain    Allergies   Allergen Reactions    Codeine      I take vicodin, I had a reaction along time ago    Demerol        Current Facility-Administered Medications   Medication Dose Route Frequency Provider Last Rate Last Admin    metoprolol tartrate (LOPRESSOR) tablet 12.5 mg  12.5 mg Oral BID Scarlett Chisholm MD        aspirin EC tablet 325 mg  325 mg Oral Once Fernando Echevarria MD        busPIRone (BUSPAR) tablet 15 mg  15 mg Oral TID Fernando Echevarria MD   15 mg at 11/02/21 1420    gabapentin (NEURONTIN) capsule 300 mg  300 mg Oral TID Fernando Ecehvarria MD   300 mg at 11/02/21 1420    hydroCHLOROthiazide (HYDRODIURIL) tablet 25 mg  25 mg Oral Daily Elina Peters MD   25 mg at 11/02/21 0845    lisinopril (PRINIVIL;ZESTRIL) tablet 40 mg  40 mg Oral Daily Elina Peters MD   40 mg at 11/02/21 0843    LORazepam (ATIVAN) tablet 1 mg  1 mg Oral Q6H PRN Elina Peters MD        sertraline (ZOLOFT) tablet 50 mg  50 mg Oral Daily Elina Peters MD        ondansetron (ZOFRAN-ODT) disintegrating tablet 4 mg  4 mg Oral Q8H PRN Elina Peters MD        Or    ondansetron (ZOFRAN) injection 4 mg  4 mg IntraVENous Q6H PRN Elina Peters MD        polyethylene glycol (GLYCOLAX) packet 17 g  17 g Oral Daily PRN Elina Peters MD        enoxaparin (LOVENOX) injection 40 mg  40 mg SubCUTAneous Daily Elina Peters MD   40 mg at 11/02/21 0847    perflutren lipid microspheres (DEFINITY) injection 1.65 mg  1.5 mL IntraVENous ONCE PRN Elina Peters MD        acetaminophen (TYLENOL) tablet 650 mg  650 mg Oral Q4H PRN Elina Peters MD        Or    acetaminophen (TYLENOL) suppository 650 mg  650 mg Rectal Q4H PRN Elina Peters MD        nicotine (NICODERM CQ) 21 MG/24HR 1 patch  1 patch TransDERmal Daily Elina Peters MD   1 patch at 11/02/21 0844    atorvastatin (LIPITOR) tablet 80 mg  80 mg Oral Nightly Elina Peters MD   80 mg at 11/01/21 2044    labetalol (NORMODYNE;TRANDATE) injection 10 mg  10 mg IntraVENous Q10 Min PRN Elina Peters MD        clopidogrel (PLAVIX) tablet 75 mg  75 mg Oral Daily Elina Peters MD   75 mg at 11/02/21 0843    aspirin EC tablet 81 mg  81 mg Oral Daily Elina Peters MD   81 mg at 11/02/21 0843       Past Medical History:   Diagnosis Date    Anxiety     Back pain     Bilateral carotid artery stenosis 3/12/2021    Chronic back pain     Decreased dorsalis pedis pulse 2/25/2021    Depression     Discoloration of skin of toe 2/25/2021    GERD (gastroesophageal reflux disease)     Headache(784.0)     Hyperlipidemia     Hypertension     Left carotid stenosis 2/25/2021    PVD (peripheral vascular disease) with claudication ([facility] 6/3/2021    Thyroid disease     Tobacco dependence 2/25/2021    Toe cyanosis 2/25/2021    Ulcer of great toe, left, limited to breakdown of skin ([facility] 2/25/2021       Past Surgical History:   Procedure Laterality Date    ABLATION OF DYSRHYTHMIC FOCUS      CARPAL TUNNEL RELEASE      CHOLECYSTECTOMY      FOOT SURGERY      right foot surgery 5 years ago    HYSTERECTOMY         Family History   Problem Relation Age of Onset    Cancer Mother         breast    Stroke Father        Social History     Socioeconomic History    Marital status:      Spouse name: Not on file    Number of children: Not on file    Years of education: Not on file    Highest education level: Not on file   Occupational History    Not on file   Tobacco Use    Smoking status: Current Every Day Smoker     Packs/day: 0.50     Types: Cigarettes    Smokeless tobacco: Never Used   Vaping Use    Vaping Use: Never used   Substance and Sexual Activity    Alcohol use: Yes     Comment: social. rare    Drug use: No    Sexual activity: Not on file   Other Topics Concern    Not on file   Social History Narrative    Not on file     Social Determinants of Health     Financial Resource Strain:     Difficulty of Paying Living Expenses:    Food Insecurity:     Worried About Running Out of Food in the Last Year:     Ran Out of Food in the Last Year:    Transportation Needs:     Lack of Transportation (Medical):      Lack of Transportation (Non-Medical):    Physical Activity:     Days of Exercise per Week:     Minutes of Exercise per Session:    Stress:     Feeling of Stress :    Social Connections:     Frequency of Communication with Friends and Family:     Frequency of Social Gatherings with Friends and Family:     Attends Mandaeism Services:     Active Member of Clubs or Organizations:     Attends Club or Organization Meetings:     Marital Status:    Intimate Partner Violence:     Fear of Current or Ex-Partner:     Emotionally Abused:     Physically Abused:     Sexually Abused:        Review of Systems:  Skin:  No abnormal pigmentation or rash. Eyes:  No blurring, diplopia or vision loss. Ears/Nose/Throat:  No hearing loss or vertigo. Respiratory:  No cough, pleuritic chest pain, dyspnea, or wheezing. Tobacco use, 1 pack/day    Cardiovascular: No angina, palpitations . Hypertension, hyperlipidemia    Gastrointestinal:  No nausea or vomiting; no abdominal pain or rectal bleeding. Musculoskeletal:  No arthritis or weakness. Lumbosacral spine radiculopathy with history of spinal stenosis    Neurologic:  No paralysis, paresis, seizures or headaches. Visual field defect, admitted with confusion, altered mental status    Hematologic/Lymphatic/Immunologic:  No anemia, abnormal bleeding/bruising. Endocrine:  No heat or cold intolerance. No polyphagia, polydipsia or polyuria. Physical Exam:  BP (!) 142/52   Pulse 58   Temp 97.2 °F (36.2 °C) (Temporal)   Resp 20   Ht 5' 3\" (1.6 m)   Wt 170 lb (77.1 kg)   SpO2 99%   BMI 30.11 kg/m²   General appearance:  Alert, awake, oriented x 3. No distress. Slightly hard of hearing  Skin:  Warm and dry. Head:  Normocephalic. No masses, lesions or tenderness. Eyes:  Conjunctivae appear normal; PERRL. Peripheral loss of vision noted, consistent with right homonymous hemianopsia  Ears:  External ears normal.  Nose/Sinuses:  Septum midline, mucosa normal; no drainage. Oropharynx:  Clear, no exudate noted. Neck:  No jugular venous distention, lymphadenopathy or thyromegaly. Carotid bruit noted      Lungs:  Clear to ausculation bilaterally. No rhonchi, crackles, wheezes. Heart:  Regular rate and rhythm. No rub or murmur. .    Abdomen:  Soft, non-tender. No masses, organomegaly. Musculoskeletal: No joint effusions, tenderness swelling or warmth. Neuro: Speech is intact. Moving all extremities.  No focal motor or sensory deficits. Extremities:  Both feet are warm to touch. The color of both feet is normal.          Pulses Right  Left    Brachial 3 3    Radial    3=normal   Femoral 2 2  2=diminished   Popliteal    1=barely palpable   Dorsalis pedis 1 0  0=absent   Posterior tibial    4=aneurysmal           Other pertinent information:1. The past medical records were reviewed. 2.    Lab Results   Component Value Date    WBC 9.8 11/02/2021    HGB 14.0 11/02/2021    HCT 42.0 11/02/2021    MCV 90.5 11/02/2021     11/02/2021      Lab Results   Component Value Date     11/02/2021    K 3.7 11/02/2021     11/02/2021    CO2 26 11/02/2021    BUN 32 (H) 11/02/2021    CREATININE 1.3 (H) 11/02/2021    GLUCOSE 98 11/02/2021    CALCIUM 9.8 11/02/2021    PROT 6.8 11/02/2021    LABALBU 4.1 11/02/2021    BILITOT 0.3 11/02/2021    ALKPHOS 93 11/02/2021    AST 15 11/02/2021    ALT 11 11/02/2021    LABGLOM 41 11/02/2021    GFRAA 49 11/02/2021     Lab Results   Component Value Date    APTT 28.0 06/19/2021      Lab Results   Component Value Date    INR 0.9 06/19/2021    INR 1.0 10/10/2014    PROTIME 10.1 06/19/2021    PROTIME 10.2 10/10/2014        3. CTA HEAD W CONTRAST   Final Result   CTA Neck:      Severe stenosis of the left cervical ICA, greater than 70%. Moderate   stenosis of the right cervical ICA, 50-69%. CTA Head:      No evidence of proximal large vessel arterial occlusion or high-grade   stenosis. CTA NECK W CONTRAST   Final Result   CTA Neck:      Severe stenosis of the left cervical ICA, greater than 70%. Moderate   stenosis of the right cervical ICA, 50-69%. CTA Head:      No evidence of proximal large vessel arterial occlusion or high-grade   stenosis. US CAROTID ARTERY BILATERAL   Final Result   Atherosclerotic disease.  Hemodynamically significant stenosis is   identified   Estimated stenosis by NASCET criteria in the proximal right carotid   artery is between 70% to 89% Estimated stenosis by NASCET criteria in the proximal left carotid   artery is between 90% and 99%. . The stenosis on the left appears to   represent a significant interval change. Correlation with MR   angiography or CT angiography is suggested. In situ thrombus could   give this appearance. ALERT:  THIS IS AN ABNORMAL REPORT         MRI BRAIN WO CONTRAST   Final Result   Multiple small recent infarcts in the left occipital, parietal, and frontal   lobes. No significant mass effect. No evidence of intracranial hemorrhage or   hydrocephalus. CT HEAD WO CONTRAST   Final Result   Suspect small acute left posterior parietal acute infarct           4. The history physical, neurology consultation notes were reviewed    5. The MRI of the brain was personally reviewed, and reviewed again with the neurology service, Dr. Simon Weaver, infarct is mainly in the left parietal lobe with some extension to the frontal lobe, and also the watershed area but not actually involve the occipital lobe as reported by radiology service    6. The CTA of the carotids was personally reviewed by me, approximately 60% smooth stenosis on the right side, 80% stenosis with ulceration on the left side    7. The carotid ultrasound study was personally reviewed, mildly increased velocities on the left side, with peak systolic velocity of 407 and diastolic velocity of 534 seconds/s consistent with 90% stenosis on the left side    8. The last ankle-brachial was done March, 0.77 on the left mainly due to femoral-popliteal arterial occlusive disease    9. The chest x-ray and EKG were reviewed, no major abnormal findings    10. The 2D echo of the heart is pending    Assessment:    1.   Multiple cerebral infarction the left side more the parietal lobe in the frontal lobe, in the watershed area with the visual field defects, associated with the 80% stenosis of the left internal carotid with ulceration, based upon the current ulcer, greater than 90% stenosis based upon the velocities, in association right internal carotid stenosis of 60%    2. Peripheral artery disease with claudication mainly due to left femoral-popliteal arterial occlusive disease    3. Hypertension, hyperlipidemia    4.   Tobacco use    Patient Active Problem List   Diagnosis    Radiculopathy, lumbar region    Spinal stenosis, lumbar region, without neurogenic claudication    Tobacco dependence    Toe cyanosis    Discoloration of skin of toe    Difficulty walking    Decreased dorsalis pedis pulse    Bilateral carotid artery stenosis    PVD (peripheral vascular disease) with claudication (HCC)    Acute CVA (cerebrovascular accident) ([address]    HTN (hypertension)            Plan:     I had a long and detailed discussion with the patient and her daughter Reymundo Ng in the room telephone #[phone], inform them of my interpretation of the CT of the carotids, symptomatic left carotid stenosis of  80% on the left side, with multiple left cerebral infarction of the parietal lobe and left frontal lobe and the watershed area    Informed them, I have personally reviewed the CTA, MRI of the brain with the neurology service as well    Recommended them to consider left carotid intervention, provide patient still from a cardiac perspective    We will consult [address] [place] [address] cardiology service for further input, pending anticipated left carotid surgery    A brief discussion of left carotid surgery, with his potential risk benefits and complications, including increased neurological morbidity including stroke with worsening of her symptoms due to recent stroke were explained to the patient and her daughter, also informed him, the procedure will be done by Dr. Pamela Kaur and associates, for whom I am covering today    All their questions were answered    I have also discussed with neurology service including Dr. Lilibeth Najera    Thank you for letting me participate in the care of your patient        Electronically signed by Rosalina Jose MD on 11/2/2021 at 4:20 PM

## 2021-11-02 NOTE — PLAN OF CARE
Date of Service: 08/23/2021    REFERRING PHYSICIAN:  Rufus Melvin MD.  Ryan Mir PA-C.    PRIMARY PHYSICIAN:  Carlee Jenkins MD.    PROCEDURE:  Bilateral sacroiliac joint injections performed under fluoroscopic guidance.    PREPROCEDURE DIAGNOSES:  Bilateral sacroiliac joint inflammation and pain.    The patient is a very pleasant female who did quite well with our lumbar radiofrequency ablation.  The majority of her pain has resolved.  She has persistent pain over the sacroiliac joints bilaterally.  This has improved significantly with our previous injections.  We will proceed with repeat injections today.  Further injections are not planned at this point in time.    Today's procedure along with the risks and possible complications were fully discussed and reviewed.  She consents to the procedure.    DESCRIPTION OF PROCEDURE:  The patient was brought to our fluoroscopy suite, placed in the prone position.  Sterile prep and drape was performed.  Our 22-gauge needle was placed in the lower third of the left sacroiliac joint.  An excellent left sacroiliac joint arthrogram was obtained with Omnipaque contrast material.  This was followed by injection of 2 mL of 0.25% Bupivacaine along with 8 mg of Dexamethasone.  The patient tolerated this well.  The procedure was repeated on the right side.  An excellent right sacroiliac joint arthrogram was obtained.  A total of 2 mL of 0.25% Bupivacaine along with 8 mg of Dexamethasone was placed in the right sacroiliac joint.  The patient tolerated this well.  She was observed and subsequently discharged home in excellent condition.  She will call with any questions or problems in the future.    Thank you for allowing me to participate in this pleasant patient's care.  I am quite pleased with her response.  I will keep you informed of her progress.      Dictated By: Noe Devine MD  Signing Provider: MD CAROL Drake/MIGUEL ([id number])  DD:  Problem: HEMODYNAMIC STATUS  Goal: Patient has stable vital signs and fluid balance  11/2/2021 0805 by Sandrita Hankins RN  Outcome: Ongoing  11/2/2021 0804 by Sandrita Hankins RN  Outcome: Ongoing     Problem: ACTIVITY INTOLERANCE/IMPAIRED MOBILITY  Goal: Mobility/activity is maintained at optimum level for patient  11/2/2021 0805 by Sandrita Hankins RN  Outcome: Ongoing  11/2/2021 0804 by Sandrita Hankins RN  Outcome: Ongoing     Problem: COMMUNICATION IMPAIRMENT  Goal: Ability to express needs and understand communication  11/2/2021 0805 by Sandrita Hankins RN  Outcome: Ongoing  11/2/2021 0804 by Sandrita Hankins RN  Outcome: Ongoing     Problem: Falls - Risk of:  Goal: Will remain free from falls  Description: Will remain free from falls  11/2/2021 0805 by Sandrita Hankins RN  Outcome: Ongoing  11/2/2021 0804 by Sandrita Hankins RN  Outcome: Ongoing  Goal: Absence of physical injury  Description: Absence of physical injury  11/2/2021 0805 by Sandrita Hankins RN  Outcome: Ongoing  11/2/2021 0804 by Sandrita Hankins RN  Outcome: Ongoing 08/23/2021 10:54:35 TD: 08/24/2021 02:40:41    Copy Sent To:

## 2021-11-02 NOTE — PROGRESS NOTES
Physical Therapy  Physical Therapy Initial Assessment     Name: Tu Alfonso  : 1955  MRN: [id number]      Date of Service: 2021    Evaluating PT:  Eugenia Noriega PT, DPT [id number]     Room #:  0145/2394-A  Diagnosis:  Essential hypertension [I10]  Acute CVA (cerebrovascular accident) ([facility] [address] [I63.9]  Reason for admission: AMS  Precautions:  Falls  Procedure/Surgery:  N/A  Equipment Recommendations: None     SUBJECTIVE:  Pt lives alone in a 2 story home with 0 steps to enter, 2nd floor set-up with 10-12 steps and 1 HR. Pt ambulated independently PTA. OBJECTIVE:   Initial Evaluation  Date:  Treatment   Short Term/ Long Term   Goals   AM-PAC 6 Clicks      Was pt agreeable to Eval/treatment?  yes     Does pt have pain? denies     Bed Mobility  Rolling: Indep  Supine to sit: indep  Sit to supine: indep  Scooting: indep  indep   Transfers Sit to stand: indep  Stand to sit: indep  Stand pivot: indep  indep   Ambulation    300 feet with no AD SBA    >400 feet with no AD indep   Stair negotiation: ascended and descended  8 steps with 1 rail SBA  >12 steps with 1 rail mod i   ROM BUE:  See OT eval   BLE:  WFL     Strength BUE:  See OT eval   BLE:  knee ext 5/5  Ankle DF 5/5  Increase by 1/3 MMT grade    Balance Sitting EOB:  indep  Dynamic Standing:  SBA  Sitting EOB:  indep  Dynamic Standing:  indep     -Pt is A & O x 3  -Sensation:  Pt denies numbness and tingling to extremities  -Edema:  unremarkable     Therapeutic Exercises:  Functional activity     Patient education  Pt educated on safety, sequencing of transfers, and role of PT    Patient response to education:   Pt verbalized understanding Pt demonstrated skill Pt requires further education in this area   yes yes yes     ASSESSMENT:  Conditions Requiring Skilled Therapeutic Intervention:  []Decreased strength     []Decreased ROM  [x]Decreased functional mobility  [x]Decreased balance   [x]Decreased endurance    []Decreased posture  []Decreased sensation  []Decreased coordination   []Decreased vision  []Decreased safety awareness   []Increased pain       Comments:  Pt subjective: pt reports feeling lightheaded throughout session, and that she feels something has been \"off\" when getting up and walking around during her stay at the hospital.       Pt received supine in bed and agreeable to PT session   Bed mobility and transfers assessed at bedside, no apparent difficulty or assist required. While sitting EOB, pt reported lightheadedness which did not resolve, improve, or worsen with change of position. Pt ambulated in hallway with therapist close standby d/t pt report of constant lightheadedness and feeling \"off\". Pt demonstrated occasional scissoring and unsteadiness but was able to self correct. Pt ascended/descended 8 steps using 1 HR with close standby for safety, pt still denied worsening of lightheadedness -- feeling of unsteadiness persisted. Pt returned to bedside to end session. Pt with all needs met and call light in reach. Pt would benefit from continued PT POC to address functional deficits described above. Treatment:  Patient practiced and was instructed in the following treatment:     Patient education provided continuously throughout session for sequencing, safety maintenance, and improving any deficits found during the evaluation.    Bed mobility training - pt given verbal and tactile cues to facilitate proper sequencing and safety during rolling and supine>sit as well as provided with physical assistance to complete task    Sitting EOB for >3 minutes for upright tolerance, postural awareness and BLE ROM   STS and pivot transfer training - pt educated on proper hand and foot placement, safety and sequencing, and use of proper technique to safely complete sit<>stand and pivot transfers with hands on assistance to complete task safely   Gait training- pt was given verbal and tactile cues to facilitate safety/balance during ambulation as well as provided with physical assistance to complete task.  Stair Negotiation - pt ascended/descended 8 steps using 1 handrail with verbal and tactile cues to facilitated safety/balance and assess lightheadedness. Pt's/ family goals   1. Return to her own home    Patient and or family understand(s) diagnosis, prognosis, and plan of care. yes    Prognosis is good for reaching above PT goals. PHYSICAL THERAPY PLAN OF CARE:    PT POC is established based on physician order and patient diagnosis     Referring provider/PT Order:    10/31/21 2300  PT evaluation and treat      Rafaela Moran MD       Diagnosis:  Essential hypertension [I10]  Acute CVA (cerebrovascular accident) ([facility] [address] [I63.9]  Specific instructions for next treatment:  Progress ambulation distance and volume of steps. Current Treatment Recommendations:   [] Strengthening to improve independence with functional mobility   [] ROM to improve independence with functional mobility   [] Balance Training to improve static/dynamic balance and to reduce fall risk  [x] Endurance Training to improve activity tolerance during functional mobility   [] Transfer Training to improve safety and independence with all functional transfers   [x] Gait Training to improve gait mechanics, endurance and asses need for appropriate assistive device  [x] Stair Training in preparation for safe discharge home and/or into the community   [] Positioning to prevent skin breakdown and contractures  [] Safety and Education Training    [] Patient/Caregiver Education   [] HEP  [] Other     PT long term treatment goals are located in above grid    Frequency of treatments: 2-5x/week x 1-2 weeks.     Time in  1020  Time out  1035    Total Treatment Time  0 minutes     Evaluation Time includes thorough review of current medical information, gathering information on past medical history/social history and prior level of function, completion of standardized testing/informal observation of tasks, assessment of data and education on plan of care and goals.     CPT codes:  [x] Low Complexity PT evaluation 08227  [] Moderate Complexity PT evaluation 03415  [] High Complexity PT evaluation 08895  [] PT Re-evaluation 96974  [] Gait training 00615 -- minutes  [] Manual therapy [address] [facility] -- minutes  [] Therapeutic activities 85969 -- minutes  [] Therapeutic exercises 51956 -- minutes  [] Neuromuscular reeducation 21084 -- minutes     Montez Garcia, TRACEE Bashir, PT, DPT  [id number]

## 2021-11-03 ENCOUNTER — ANESTHESIA EVENT (OUTPATIENT)
Dept: OPERATING ROOM | Age: 66
DRG: 027 | End: 2021-11-03
Payer: MEDICARE

## 2021-11-03 PROBLEM — I65.22 SYMPTOMATIC STENOSIS OF LEFT CAROTID ARTERY: Chronic | Status: ACTIVE | Noted: 2021-11-03

## 2021-11-03 LAB
ABO/RH: NORMAL
ALBUMIN SERPL-MCNC: 3.8 G/DL (ref 3.5–5.2)
ALP BLD-CCNC: 80 U/L (ref 35–104)
ALT SERPL-CCNC: 10 U/L (ref 0–32)
ANION GAP SERPL CALCULATED.3IONS-SCNC: 15 MMOL/L (ref 7–16)
ANTIBODY SCREEN: NORMAL
APTT: 26.7 SEC (ref 24.5–35.1)
AST SERPL-CCNC: 17 U/L (ref 0–31)
BASOPHILS ABSOLUTE: 0.05 E9/L (ref 0–0.2)
BASOPHILS RELATIVE PERCENT: 0.8 % (ref 0–2)
BILIRUB SERPL-MCNC: 0.4 MG/DL (ref 0–1.2)
BUN BLDV-MCNC: 25 MG/DL (ref 6–23)
CALCIUM SERPL-MCNC: 9.3 MG/DL (ref 8.6–10.2)
CHLORIDE BLD-SCNC: 98 MMOL/L (ref 98–107)
CO2: 25 MMOL/L (ref 22–29)
CREAT SERPL-MCNC: 1.1 MG/DL (ref 0.5–1)
EOSINOPHILS ABSOLUTE: 0.24 E9/L (ref 0.05–0.5)
EOSINOPHILS RELATIVE PERCENT: 3.7 % (ref 0–6)
GFR AFRICAN AMERICAN: 60
GFR NON-AFRICAN AMERICAN: 49 ML/MIN/1.73
GLUCOSE BLD-MCNC: 98 MG/DL (ref 74–99)
HBA1C MFR BLD: 5.8 % (ref 4–5.6)
HCT VFR BLD CALC: 41 % (ref 34–48)
HEMOGLOBIN: 13.8 G/DL (ref 11.5–15.5)
IMMATURE GRANULOCYTES #: 0.03 E9/L
IMMATURE GRANULOCYTES %: 0.5 % (ref 0–5)
INR BLD: 0.9
LYMPHOCYTES ABSOLUTE: 2.01 E9/L (ref 1.5–4)
LYMPHOCYTES RELATIVE PERCENT: 31 % (ref 20–42)
MCH RBC QN AUTO: 30.9 PG (ref 26–35)
MCHC RBC AUTO-ENTMCNC: 33.7 % (ref 32–34.5)
MCV RBC AUTO: 91.9 FL (ref 80–99.9)
MONOCYTES ABSOLUTE: 0.7 E9/L (ref 0.1–0.95)
MONOCYTES RELATIVE PERCENT: 10.8 % (ref 2–12)
NEUTROPHILS ABSOLUTE: 3.46 E9/L (ref 1.8–7.3)
NEUTROPHILS RELATIVE PERCENT: 53.2 % (ref 43–80)
PDW BLD-RTO: 12.8 FL (ref 11.5–15)
PLATELET # BLD: 302 E9/L (ref 130–450)
PMV BLD AUTO: 9.1 FL (ref 7–12)
POTASSIUM SERPL-SCNC: 4 MMOL/L (ref 3.5–5)
PROTHROMBIN TIME: 10.1 SEC (ref 9.3–12.4)
RBC # BLD: 4.46 E12/L (ref 3.5–5.5)
SODIUM BLD-SCNC: 138 MMOL/L (ref 132–146)
TOTAL PROTEIN: 5.9 G/DL (ref 6.4–8.3)
WBC # BLD: 6.5 E9/L (ref 4.5–11.5)

## 2021-11-03 PROCEDURE — 6370000000 HC RX 637 (ALT 250 FOR IP): Performed by: NURSE PRACTITIONER

## 2021-11-03 PROCEDURE — 80053 COMPREHEN METABOLIC PANEL: CPT

## 2021-11-03 PROCEDURE — 6370000000 HC RX 637 (ALT 250 FOR IP): Performed by: FAMILY MEDICINE

## 2021-11-03 PROCEDURE — 99232 SBSQ HOSP IP/OBS MODERATE 35: CPT | Performed by: NURSE PRACTITIONER

## 2021-11-03 PROCEDURE — 85610 PROTHROMBIN TIME: CPT

## 2021-11-03 PROCEDURE — 86900 BLOOD TYPING SEROLOGIC ABO: CPT

## 2021-11-03 PROCEDURE — 85730 THROMBOPLASTIN TIME PARTIAL: CPT

## 2021-11-03 PROCEDURE — 6370000000 HC RX 637 (ALT 250 FOR IP): Performed by: STUDENT IN AN ORGANIZED HEALTH CARE EDUCATION/TRAINING PROGRAM

## 2021-11-03 PROCEDURE — 83036 HEMOGLOBIN GLYCOSYLATED A1C: CPT

## 2021-11-03 PROCEDURE — 86850 RBC ANTIBODY SCREEN: CPT

## 2021-11-03 PROCEDURE — 85025 COMPLETE CBC W/AUTO DIFF WBC: CPT

## 2021-11-03 PROCEDURE — 2060000000 HC ICU INTERMEDIATE R&B

## 2021-11-03 PROCEDURE — 36415 COLL VENOUS BLD VENIPUNCTURE: CPT

## 2021-11-03 PROCEDURE — 99223 1ST HOSP IP/OBS HIGH 75: CPT | Performed by: INTERNAL MEDICINE

## 2021-11-03 PROCEDURE — 86901 BLOOD TYPING SEROLOGIC RH(D): CPT

## 2021-11-03 PROCEDURE — 6360000002 HC RX W HCPCS: Performed by: STUDENT IN AN ORGANIZED HEALTH CARE EDUCATION/TRAINING PROGRAM

## 2021-11-03 RX ORDER — BACLOFEN 10 MG/1
10 TABLET ORAL 2 TIMES DAILY
Status: DISCONTINUED | OUTPATIENT
Start: 2021-11-03 | End: 2021-11-05 | Stop reason: HOSPADM

## 2021-11-03 RX ADMIN — BUSPIRONE HYDROCHLORIDE 15 MG: 10 TABLET ORAL at 08:55

## 2021-11-03 RX ADMIN — BUSPIRONE HYDROCHLORIDE 15 MG: 10 TABLET ORAL at 13:57

## 2021-11-03 RX ADMIN — LORAZEPAM 1 MG: 1 TABLET ORAL at 21:30

## 2021-11-03 RX ADMIN — ACETAMINOPHEN 650 MG: 325 TABLET ORAL at 15:34

## 2021-11-03 RX ADMIN — ENOXAPARIN SODIUM 40 MG: 40 INJECTION SUBCUTANEOUS at 08:54

## 2021-11-03 RX ADMIN — BUSPIRONE HYDROCHLORIDE 15 MG: 10 TABLET ORAL at 21:00

## 2021-11-03 RX ADMIN — ACETAMINOPHEN 650 MG: 325 TABLET ORAL at 10:48

## 2021-11-03 RX ADMIN — LORAZEPAM 1 MG: 1 TABLET ORAL at 15:34

## 2021-11-03 RX ADMIN — CLOPIDOGREL BISULFATE 75 MG: 75 TABLET ORAL at 08:56

## 2021-11-03 RX ADMIN — ACETAMINOPHEN 650 MG: 325 TABLET ORAL at 19:46

## 2021-11-03 ASSESSMENT — PAIN SCALES - GENERAL
PAINLEVEL_OUTOF10: 6
PAINLEVEL_OUTOF10: 0
PAINLEVEL_OUTOF10: 6
PAINLEVEL_OUTOF10: 0
PAINLEVEL_OUTOF10: 2

## 2021-11-03 ASSESSMENT — LIFESTYLE VARIABLES: SMOKING_STATUS: 1

## 2021-11-03 NOTE — CARE COORDINATION
Chart reviewed. Patient to have stress test, 11/4/21 per Dr. Bella Brasher (cardiology) for clearance for left CEA. Neurosurgery following. Neurology signed off for this patient. PT score from 11/2 - 22/24. OT score from 11/1 - 24/24. Carotid US shows right carotid artery with 70-89% stenosis and left carotid artery with 90-99% stenosis. MRI brain positive for multiple small recent infarcts in the left occipital, parietal, and frontal lobes. Plan is for patient to return home pending therapy evaluations post CEA and further recommendations.      Beatriz Dangelo, RN, BSN  [facility] Case Management   Cell: [phone]

## 2021-11-03 NOTE — PROGRESS NOTES
Som Dubois is a 74 y.o. right handed female     Neurology following for stroke    PMH of Anxiety, Back pain, bilateral carotid artery stenosis, chronic back pain, depression, GERD, Headache, HLD, HTN, PVD, Thyroid disease, ulcer of great toe ( left)     Presented to ED with AMS which had been intermittent. She was confused and c/o of headache with intermittent blurred vision. CTH negative for acute findings. She was discharged and came back to ED due to HTN, un coordination and short term memory loss. CTH repeated and showed small acute left parietal infarct. DAPT was started. CUS showed stenosis bilaterally. MRI brain showed left occipital stroke extending up to left parietal lobe. She follows with Dr. Fabi Flores OP. Patient sitting up in bed. She spoke with vascular and notes she will have CEA prior to discharge. She has a headache which has been bothering her. Notes having headaches more often lately. Takes tylenol or ibuprofen at home. She has no hx of migraines or headaches in the past.  Headaches are frontal and posterior with no tenderness to touch. No chest pain or palpitations  No coughing or wheezing    No itching or bruising appreciated  No numbness, tingling or focal arm/leg weakness    ROS otherwise negative      Objective:     BP (!) 118/56   Pulse 57   Temp 98 °F (36.7 °C) (Oral)   Resp 18   Ht 5' 3\" (1.6 m)   Wt 170 lb (77.1 kg)   SpO2 97%   BMI 30.11 kg/m²     General appearance: alert, appears stated age, cooperative and no distress  Head: normocephalic, without obvious abnormality, atraumatic  Eyes: conjunctivae/corneas clear  Neck: symmetrical, trachea midline   Lungs: clear to auscultation bilaterally  Extremities:  normal, atraumatic, no cyanosis or edema  Skin: color, texture, turgor normal---no rashes or lesions      Mental Status: Alert to self, time and place. Follows commands.     Appropriate attention/concentration  Intact fundus of IBILI 0.1 10/10/2014    LABALBU 3.8 11/03/2021     HgBA1c:    Lab Results   Component Value Date    LABA1C 5.3 11/01/2021     FLP:    Lab Results   Component Value Date    TRIG 133 11/01/2021    HDL 47 11/01/2021    LDLCALC 229 11/01/2021    LABVLDL 27 11/01/2021     CTH negative for acute findings. CTH repeated and showed small acute left parietal infarct. CUS showed stenosis bilaterally. MRI brain showed left occipital stroke extending up to left parietal lobe. Echo: Summary   Technically sub-optimal images. No intra cardiac mass or thrombus. Agitated saline injection showed no right to left shunt. Normal left ventricular chamber size. Normal left ventricular systolic function, LVEF 75%. Mild asymmetric septal hypertrophy. No LVOT obstruction. Stage I diastolic dysfunction. Interatrial septum not well visualized but appears intact. Normal right ventricle size and function. No mitral valve prolapse. There is trace aortic regurgitation. Normal aortic root size. No evidence of pericardial effusion. Compared to prior echo from 5/11/2016, no changes noted.     All labs and imaging studies reviewed independently today     Assessment:     Left occipital infarct likely due to left carotid stenosis  --- bilateral ICA's with significant stenosis  ---  and A1c 5.3  --- stroke risk factors include:  HTN, HLD, current everyday smoker   --- right  advise no driving until cleared by optometry     Chronic daily headache  --- will start Baclofen     Plan:     DAPT for 21 days then aspirin indefinitely   Started Baclofen 10 mg BID   Statin  Stroke education  PT/OT/SLP  PCD's  Neurology to sign off       YAEL Patel CNP  10:48 AM  11/3/2021

## 2021-11-03 NOTE — PROGRESS NOTES
Physician Progress Note      PATIENT:               Layne Ybarra  CSN #:                  [id number]  :                       1955  ADMIT DATE:       10/31/2021 3:28 PM  [address] [place] DATE:  RESPONDING  PROVIDER #:        ANKUR RAMIREZ          QUERY TEXT:    Pt admitted with confusion short erm memory loss and AMS. Noted documentation   of \"Left occipital infarct likely due to left carotid stenosis--- bilateral   ICA's with significant stenosis\". per neurology consult  If possible, please   document in progress notes and discharge summary:      The medical record reflects the following:  Risk Factors: age HTN CAD smoker PVD hyperlipidemia  Clinical Indicators: AMS confusion MRI: Multiple small recent infarcts in the   left occipital, parietal, and frontal  lobes Estimated stenosis by NASCET criteria in the proximal right carotid  artery is between 70% to 89%  Estimated stenosis by NASCET criteria in the proximal left carotid  artery is between 90% and 99%. The stenosis on the left appears to  represent a significant interval change.   Treatment: Neuro checks CT MRI neurology consult    Donya Magallon RN BSN CCDS  Options provided:  -- CVA due to internal carotid artery stenosis confirmed present on admission  -- CVA due to internal carotid artery stenosis confirmed not present on   admission  -- CVA due to internal carotid artery stenosis ruled out  -- Other - I will add my own diagnosis  -- Disagree - Not applicable / Not valid  -- Disagree - Clinically unable to determine / Unknown  -- Refer to Clinical Documentation Reviewer    PROVIDER RESPONSE TEXT:    Symptomatic carotid artery stenosis    Query created by: Debra Weaver on 11/3/2021 8:04 AM      Electronically signed by:  Kev Irizarry 11/3/2021 5:07 PM

## 2021-11-03 NOTE — PROGRESS NOTES
Vascular Surgery Progress Note     CC: Follow-up carotid artery stenosis    HISTORY:  The patient is awake, alert, and oriented. States she continues to have word searching. This is not progressed since admission but states that today she had 2 right information on a note pad that she could not speak. She denies any extremity weakness. IMPRESSION: Symptomatic left internal carotid artery stenosis. I reviewed the indications for surgery as well as the procedure with the patient. I reviewed the risk, benefits, and alternatives. She understands and consents for the procedure. All questions were answered. PLAN: Left carotid endarterectomy. I reviewed the patient with Dr. Dorene Simpson. He feels that she is low risk for cardiac event if we proceed with surgery without additional risk stratification including the stress test.  We will plan for follow-up office visit with cardiology as an outpatient.     Patient Active Problem List   Diagnosis Code    Radiculopathy, lumbar region M54.16    Spinal stenosis, lumbar region, without neurogenic claudication M48.061    Tobacco dependence F17.200    Discoloration of skin of toe L81.9    Difficulty walking R26.2    Decreased dorsalis pedis pulse R09.89    Bilateral carotid artery stenosis I65.23    PVD (peripheral vascular disease) with claudication ([facility]) I73.9    Acute CVA (cerebrovascular accident) ([facility] [address] I63.9    HTN (hypertension) I10    Visual field defects H53.40    Symptomatic stenosis of left carotid artery I65.22       Current Medications:      LORazepam, ondansetron **OR** ondansetron, polyethylene glycol, perflutren lipid microspheres, acetaminophen **OR** acetaminophen, labetalol    baclofen  10 mg Oral BID    metoprolol tartrate  12.5 mg Oral BID    aspirin  325 mg Oral Once    busPIRone  15 mg Oral TID    gabapentin  300 mg Oral TID    hydroCHLOROthiazide  25 mg Oral Daily    lisinopril  40 mg Oral Daily    sertraline  50 mg Oral Daily    enoxaparin  40 mg SubCUTAneous Daily    nicotine  1 patch TransDERmal Daily    atorvastatin  80 mg Oral Nightly    clopidogrel  75 mg Oral Daily    aspirin  81 mg Oral Daily          PHYSICAL EXAM:    Vitals:    11/03/21 0800   BP: (!) 118/56   Pulse: 57   Resp: 18   Temp: 98 °F (36.7 °C)   SpO2: 97%     CONSTITUTIONAL:  awake, alert, cooperative, no apparent distress, continues to have word searching.   LUNGS:  no increased work of breathing, good air exchange and clear to auscultation  CARDIOVASCULAR:  regular rate and rhythm  ABDOMEN:  non-distended      LABS:    Lab Results   Component Value Date    WBC 6.5 11/03/2021    HGB 13.8 11/03/2021    HCT 41.0 11/03/2021     11/03/2021    PROTIME 10.1 11/03/2021    INR 0.9 11/03/2021    APTT 26.7 11/03/2021    K 4.0 11/03/2021    BUN 25 (H) 11/03/2021    CREATININE 1.1 (H) 11/03/2021       RADIOLOGY:

## 2021-11-03 NOTE — PLAN OF CARE
Problem: HEMODYNAMIC STATUS  Goal: Patient has stable vital signs and fluid balance  Outcome: Met This Shift     Problem: ACTIVITY INTOLERANCE/IMPAIRED MOBILITY  Goal: Mobility/activity is maintained at optimum level for patient  Outcome: Met This Shift     Problem: COMMUNICATION IMPAIRMENT  Goal: Ability to express needs and understand communication  Outcome: Met This Shift     Problem: Falls - Risk of:  Goal: Will remain free from falls  Description: Will remain free from falls  Outcome: Met This Shift     Problem: Falls - Risk of:  Goal: Absence of physical injury  Description: Absence of physical injury  Outcome: Met This Shift     Problem: Neurological  Goal: Maximum potential motor/sensory/cognitive function  Outcome: Met This Shift

## 2021-11-03 NOTE — PROGRESS NOTES
Hospitalist Progress Note      SYNOPSIS: Patient admitted on 10/31/2021 for Acute left posterior parietal acute infarct    SUBJECTIVE:  Patient seen and examined chart reviewed discussed with nursing staff. No overnight events reported. Patient still have word finding difficulties. No other complaints from patient. Denies any chest pain or shortness of breath or dizziness at this time. Evaluated by vascular surgery and cardiology. Plan for left carotid enterectomy      Temp (24hrs), Av.8 °F (36.6 °C), Min:97.2 °F (36.2 °C), Max:98.1 °F (36.7 °C)    DIET: ADULT DIET; Regular  Diet NPO Exceptions are: Sips of Water with Meds  CODE: Full Code  No intake or output data in the 24 hours ending 21 7602    OBJECTIVE:    BP (!) 121/59   Pulse 62   Temp 98.1 °F (36.7 °C) (Oral)   Resp 18   Ht 5' 3\" (1.6 m)   Wt 170 lb (77.1 kg)   SpO2 97%   BMI 30.11 kg/m²     General appearance: No apparent distress, appears stated age and cooperative. HEENT:  Conjunctivae/corneas clear. Neck: Supple. No jugular venous distention. Respiratory: Clear to auscultation bilaterally, normal respiratory effort  Cardiovascular: Regular rate rhythm, normal S1-S2  Abdomen: Soft, nontender, nondistended  Musculoskeletal: No clubbing, cyanosis, no bilateral lower extremity edema. Brisk capillary refill. Skin:  No rashes  on visible skin  Neurologic: awake, alert and following commands     ASSESSMENT:  Principal Problem:    Symptomatic stenosis of left carotid artery  Active Problems:    Tobacco dependence    Bilateral carotid artery stenosis    PVD (peripheral vascular disease) with claudication (HCC)    Acute CVA (cerebrovascular accident) ([facility] [address]    HTN (hypertension)    Visual field defects  Resolved Problems:    * No resolved hospital problems.  *     PLAN:  - LKW on 10/30/2021 at 5:00pm  - Pt out of window for tPA on admission  - CTH suspicious for small acute left posterior parietal acute infarct  - Lipid panel, Hemoglobin A1c, and alcohol level reviewed  - F/U RPR, Homocysteine\  - Obtain Echocardiogram with bubble study  - Pt with EKG showing Sinus Behzad  - Maintain on tele  - If HR continues to vary will obtain EP consult  - Pt likely to benefit from holter monitor at discharge if no arrhthymias captured during inpatient stay to r/o PAF  - Neurology consult  - TTE with bubble  - NIHSS  - DAPT with ASA and Plavix  - Cont on Lipitor 80, decrease to 40 in 4 days; repeat lipid panel in 4 weeks  - BP Control (SBP: 140-160)  - Maintain normothermia with Tylenol PRN and early antibiotics if indicated  - Avoid hyperglycemia; monitor finger stick < 200  - Pt without any notable focal neurologic deficits at current  - Regular diet  - Prophylaxis of complication of stroke              Early mobilization with out of bed to chair and upgrade. PT/OT. Elevate head of bed and oral hygiene to prevent aspirations; Aspiration precaution              Speech and swallow evaluation ASAP to guide oral feeding              Rehab consult              Avoid indwelling bowman catheters              Depression prevention              Dietary consult to adjust lifestyle risk factors for repeated CVA  Echo pending. Vascular surgery consulted for symptomatic carotid stenosis  Asymptomatic bradycardia, will decrease metoprolol dose  Patient will need Holter monitor on discharge.   DISPOSITION: intermediate    Medications:  REVIEWED DAILY    Infusion Medications   Scheduled Medications    baclofen  10 mg Oral BID    metoprolol tartrate  12.5 mg Oral BID    aspirin  325 mg Oral Once    busPIRone  15 mg Oral TID    gabapentin  300 mg Oral TID    hydroCHLOROthiazide  25 mg Oral Daily    lisinopril  40 mg Oral Daily    sertraline  50 mg Oral Daily    [Held by provider] enoxaparin  40 mg SubCUTAneous Daily    nicotine  1 patch TransDERmal Daily    atorvastatin  80 mg Oral Nightly    [Held by provider] clopidogrel  75 mg Oral Daily    aspirin  81 mg Oral Daily     PRN Meds: LORazepam, ondansetron **OR** ondansetron, polyethylene glycol, perflutren lipid microspheres, acetaminophen **OR** acetaminophen, labetalol    Labs:     Recent Labs     11/01/21  1018 11/02/21  1050 11/03/21  0558   WBC 7.7 9.8 6.5   HGB 14.9 14.0 13.8   HCT 43.8 42.0 41.0    365 302       Recent Labs     11/02/21  1050 11/03/21  0558    138   K 3.7 4.0    98   CO2 26 25   BUN 32* 25*   CREATININE 1.3* 1.1*   CALCIUM 9.8 9.3       Recent Labs     11/02/21  1050 11/03/21  0558   PROT 6.8 5.9*   ALKPHOS 93 80   ALT 11 10   AST 15 17   BILITOT 0.3 0.4       Recent Labs     11/03/21  0558   INR 0.9       No results for input(s): Prentis Revels in the last 72 hours. Chronic labs:    Lab Results   Component Value Date    CHOL 303 (H) 11/01/2021    TRIG 133 11/01/2021    HDL 47 11/01/2021    LDLCALC 229 (H) 11/01/2021    TSH 2.440 10/31/2021    INR 0.9 11/03/2021    LABA1C 5.3 11/01/2021       Radiology: REVIEWED DAILY    +++++++++++++++++++++++++++++++++++++++++++++++++  charu pryor MD  [facility] - [address] [place], [place]  +++++++++++++++++++++++++++++++++++++++++++++++++  NOTE: This report was transcribed using voice recognition software. Every effort was made to ensure accuracy; however, inadvertent computerized transcription errors may be present.

## 2021-11-03 NOTE — ANESTHESIA PRE PROCEDURE
Department of Anesthesiology  Preprocedure Note       Name:  Rock Harris   Age:  74 y.o.  :  1955                                          MRN:  [id number]         Date:  11/3/2021      Surgeon: Shawn Reyna):  Raul Veras MD    Procedure: Procedure(s):  LEFT CAROTID ENDARTERECTOMY    Medications prior to admission:   Prior to Admission medications    Medication Sig Start Date End Date Taking? Authorizing Provider   lisinopril (PRINIVIL;ZESTRIL) 20 MG tablet Take 2 tablets by mouth daily 10/29/21  Yes Chapincito Houston DO   hydroCHLOROthiazide (HYDRODIURIL) 25 MG tablet Take 1 tablet by mouth daily 10/29/21  Yes Chapincito Houston DO   aspirin 81 MG EC tablet Take 81 mg by mouth daily   Yes Historical Provider, MD   lorazepam (ATIVAN) 0.5 MG tablet Take 1 mg by mouth every 6 hours as needed    Yes Historical Provider, MD   gabapentin (NEURONTIN) 300 MG capsule Take 300 mg by mouth 3 times daily. Historical Provider, MD   cilostazol (PLETAL) 100 MG tablet Take 1 tablet by mouth 2 times daily  Patient not taking: Reported on 3/25/2021 3/12/21 4/11/21  Nguyen Avendano MD   metoprolol tartrate (LOPRESSOR) 25 MG tablet Take 25 mg by mouth 2 times daily    Historical Provider, MD   busPIRone (BUSPAR) 15 MG tablet Take 15 mg by mouth 3 times daily    Historical Provider, MD   albuterol (PROAIR HFA) 108 (90 BASE) MCG/ACT inhaler Inhale 2 puffs into the lungs every 6 hours as needed for Wheezing for up to 7 days.  10/10/14 10/17/14  Abdifatah Han MD   sertraline (ZOLOFT) 100 MG tablet Take 50 mg by mouth daily     Historical Provider, MD       Current medications:    Current Facility-Administered Medications   Medication Dose Route Frequency Provider Last Rate Last Admin    baclofen (LIORESAL) tablet 10 mg  10 mg Oral BID YAEL Minor - LEONIDAS        metoprolol tartrate (LOPRESSOR) tablet 12.5 mg  12.5 mg Oral BID Shantell Ojeda MD   12.5 mg at 21 0856    aspirin EC tablet 325 mg 325 mg Oral Once Kathia Dailey MD        busPIRone (BUSPAR) tablet 15 mg  15 mg Oral TID Kathia Dailey MD   15 mg at 11/03/21 0855    gabapentin (NEURONTIN) capsule 300 mg  300 mg Oral TID Kathia Dailey MD   300 mg at 11/03/21 0855    hydroCHLOROthiazide (HYDRODIURIL) tablet 25 mg  25 mg Oral Daily Kathia Dailey MD   25 mg at 11/03/21 0855    lisinopril (PRINIVIL;ZESTRIL) tablet 40 mg  40 mg Oral Daily Kathia Dailey MD   40 mg at 11/03/21 0854    LORazepam (ATIVAN) tablet 1 mg  1 mg Oral Q6H PRN Kathia Dailey MD   1 mg at 11/02/21 1820    sertraline (ZOLOFT) tablet 50 mg  50 mg Oral Daily Kathia Dailey MD   50 mg at 11/03/21 0856    ondansetron (ZOFRAN-ODT) disintegrating tablet 4 mg  4 mg Oral Q8H PRN Kathia Dailey MD        Or    ondansetron (ZOFRAN) injection 4 mg  4 mg IntraVENous Q6H PRN Kathia Dailey MD        polyethylene glycol (GLYCOLAX) packet 17 g  17 g Oral Daily PRN Kathia Dailey MD        enoxaparin (LOVENOX) injection 40 mg  40 mg SubCUTAneous Daily Kathia Dailey MD   40 mg at 11/03/21 0854    perflutren lipid microspheres (DEFINITY) injection 1.65 mg  1.5 mL IntraVENous ONCE PRN Kathia Dailey MD        acetaminophen (TYLENOL) tablet 650 mg  650 mg Oral Q4H PRN Kathia Dailey MD   650 mg at 11/03/21 1048    Or    acetaminophen (TYLENOL) suppository 650 mg  650 mg Rectal Q4H PRN Kathia Dailey MD        nicotine (NICODERM CQ) 21 MG/24HR 1 patch  1 patch TransDERmal Daily Kathia Dailey MD   1 patch at 11/03/21 0900    atorvastatin (LIPITOR) tablet 80 mg  80 mg Oral Nightly Kathia Dailey MD   80 mg at 11/02/21 2007    labetalol (NORMODYNE;TRANDATE) injection 10 mg  10 mg IntraVENous Q10 Min PRN Kathia Dailey MD        clopidogrel (PLAVIX) tablet 75 mg  75 mg Oral Daily Kathia Dailey MD   75 mg at 11/03/21 0856    aspirin EC tablet 81 mg  81 mg Oral Daily Kathia Dailey MD   81 mg at 11/03/21 0855       Allergies:     Allergies   Allergen Reactions    Codeine      I take vicodin, I had a reaction along time ago    Demerol        Problem List:    Patient Active Problem List   Diagnosis Code    Radiculopathy, lumbar region M54.16    Spinal stenosis, lumbar region, without neurogenic claudication M48.061    Tobacco dependence F17.200    Discoloration of skin of toe L81.9    Difficulty walking R26.2    Decreased dorsalis pedis pulse R09.89    Bilateral carotid artery stenosis I65.23    PVD (peripheral vascular disease) with claudication (HCC) I73.9    Acute CVA (cerebrovascular accident) ([address] I63.9    HTN (hypertension) I10    Visual field defects H53.40       Past Medical History:        Diagnosis Date    Anxiety     Back pain     Bilateral carotid artery stenosis 3/12/2021    Chronic back pain     Decreased dorsalis pedis pulse 2/25/2021    Depression     Discoloration of skin of toe 2/25/2021    GERD (gastroesophageal reflux disease)     Headache(784.0)     Hyperlipidemia     Hypertension     Left carotid stenosis 2/25/2021    PVD (peripheral vascular disease) with claudication ([address] 6/3/2021    Thyroid disease     Tobacco dependence 2/25/2021    Toe cyanosis 2/25/2021    Ulcer of great toe, left, limited to breakdown of skin ([address] 2/25/2021    Visual field defects 11/2/2021       Past Surgical History:        Procedure Laterality Date    ABLATION OF DYSRHYTHMIC FOCUS      CARPAL TUNNEL RELEASE      CHOLECYSTECTOMY      FOOT SURGERY      right foot surgery 5 years ago    HYSTERECTOMY         Social History:    Social History     Tobacco Use    Smoking status: Current Every Day Smoker     Packs/day: 0.50     Types: Cigarettes    Smokeless tobacco: Never Used   Substance Use Topics    Alcohol use: Yes     Comment: social. rare                                Ready to quit: Not Answered  Counseling given: Not Answered      Vital Signs (Current):   Vitals:    11/02/21 1915 11/02/21 2315 11/03/21 0437 11/03/21 0800   BP: 127/61 (!) 124/58 (!) 138/52 (!) 118/56 Pulse: 59 52 59 57   Resp: 20 16 18 18   Temp: 36.6 °C (97.8 °F) 36.6 °C (97.8 °F) 36.6 °C (97.9 °F) 36.7 °C (98 °F)   TempSrc: Temporal Temporal Temporal Oral   SpO2: 97% 98% 93% 97%   Weight:       Height:                                                  BP Readings from Last 3 Encounters:   11/03/21 (!) 118/56   10/29/21 (!) 190/73   06/19/21 131/63       NPO Status:  NPO after midnight                                                                               BMI:   Wt Readings from Last 3 Encounters:   11/01/21 170 lb (77.1 kg)   10/29/21 170 lb (77.1 kg)   06/19/21 170 lb (77.1 kg)     Body mass index is 30.11 kg/m². CBC:   Lab Results   Component Value Date    WBC 6.5 11/03/2021    RBC 4.46 11/03/2021    HGB 13.8 11/03/2021    HCT 41.0 11/03/2021    MCV 91.9 11/03/2021    RDW 12.8 11/03/2021     11/03/2021       CMP:   Lab Results   Component Value Date     11/03/2021    K 4.0 11/03/2021    K 3.9 06/19/2021    CL 98 11/03/2021    CO2 25 11/03/2021    BUN 25 11/03/2021    CREATININE 1.1 11/03/2021    GFRAA 60 11/03/2021    LABGLOM 49 11/03/2021    GLUCOSE 98 11/03/2021    PROT 5.9 11/03/2021    CALCIUM 9.3 11/03/2021    BILITOT 0.4 11/03/2021    ALKPHOS 80 11/03/2021    AST 17 11/03/2021    ALT 10 11/03/2021       POC Tests: No results for input(s): POCGLU, POCNA, POCK, POCCL, POCBUN, POCHEMO, POCHCT in the last 72 hours.     Coags:   Lab Results   Component Value Date    PROTIME 10.1 11/03/2021    INR 0.9 11/03/2021    APTT 26.7 11/03/2021       HCG (If Applicable): No results found for: PREGTESTUR, PREGSERUM, HCG, HCGQUANT     ABGs: No results found for: PHART, PO2ART, CKD3GUC, QGN3KHX, BEART, U8UVPHVN     Type & Screen (If Applicable):  No results found for: LABABO, LABRH    Drug/Infectious Status (If Applicable):  No results found for: HIV, HEPCAB    COVID-19 Screening (If Applicable): No results found for: COVID19    CTA Neck 11/1/2021  Impression   CTA Neck:       Severe stenosis of the left cervical ICA, greater than 70%.  Moderate   stenosis of the right cervical ICA, 50-69%.       CTA Head:       No evidence of proximal large vessel arterial occlusion or high-grade   stenosis. CXR 10/29/2021  Impression   No acute cardiopulmonary disease. US Carotid 11/1/2021  Impression   Atherosclerotic disease. Hemodynamically significant stenosis is   identified   Estimated stenosis by NASCET criteria in the proximal right carotid   artery is between 70% to 89%    Estimated stenosis by NASCET criteria in the proximal left carotid   artery is between 90% and 99%. . The stenosis on the left appears to   represent a significant interval change. Correlation with MR   angiography or CT angiography is suggested. In situ thrombus could   give this appearance. ECHO 11/2/2021   Summary   Technically sub-optimal images. No intra cardiac mass or thrombus. Agitated saline injection showed no right to left shunt. Normal left ventricular chamber size. Normal left ventricular systolic function, LVEF 11%. Mild asymmetric septal hypertrophy. No LVOT obstruction. Stage I diastolic dysfunction. Interatrial septum not well visualized but appears intact. Normal right ventricle size and function. No mitral valve prolapse. There is trace aortic regurgitation. Normal aortic root size. No evidence of pericardial effusion. Compared to prior echo from 5/11/2016, no changes noted.     EKG 10/31/2021  Narrative & Impression    Normal sinus rhythm  Nonspecific T wave abnormality  Delayed R wave transition  Abnormal ECG  When compared with ECG of 29-OCT-2021 13:35,  Significant changes have occurred         Anesthesia Evaluation  Patient summary reviewed and Nursing notes reviewed no history of anesthetic complications:   Airway: Mallampati: II  TM distance: >3 FB   Neck ROM: full  Mouth opening: > = 3 FB Dental:          Pulmonary:   (+) decreased breath sounds,  asthma: current
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
## 2021-11-03 NOTE — CONSULTS
Patient seen and examined. Chart, labs, imaging studies, EKG and rhythm strips reviewed. Full consult to follow. Consulted for Pre-operative Clearance for CEA    IMPRESSION:  1. CVA  2. Bilateral carotid disease  3. PVD with claudication  4. AF Ablation in early 200's with Dr Galen Deshpande  5. HTN  6. HLD  7. COPD  8. Ongoing tobacco abuse  9. GERD  10. Anxiety  11. Lumbar radiculopathy with history of epidural injections  12. Clinically patient should be at low risk from cardiac standpoint for CEA: no angina or history of MI. No CHF. No ventricular arrhythmias, and no VHD. PLAN:   1. Continue current cardiac medications  2. May proceed with CEA on 11/3/2021 without further cardiac testing  3. Consider Lexiscan MPS in future (multiple risk factors for CAD including PVD).   4. Cardiology will see prn please pranay if needed    Electronically signed by Guillermina Hermosillo MD on 11/3/2021 at 1:49 PM

## 2021-11-04 ENCOUNTER — ANESTHESIA (OUTPATIENT)
Dept: OPERATING ROOM | Age: 66
DRG: 027 | End: 2021-11-04
Payer: MEDICARE

## 2021-11-04 VITALS — OXYGEN SATURATION: 93 % | SYSTOLIC BLOOD PRESSURE: 110 MMHG | TEMPERATURE: 97.3 F | DIASTOLIC BLOOD PRESSURE: 79 MMHG

## 2021-11-04 LAB
ALBUMIN SERPL-MCNC: 3.9 G/DL (ref 3.5–5.2)
ALP BLD-CCNC: 80 U/L (ref 35–104)
ALT SERPL-CCNC: 10 U/L (ref 0–32)
AMPHETAMINE SCREEN, URINE: NOT DETECTED
ANION GAP SERPL CALCULATED.3IONS-SCNC: 10 MMOL/L (ref 7–16)
AST SERPL-CCNC: 15 U/L (ref 0–31)
BARBITURATE SCREEN URINE: NOT DETECTED
BASOPHILS ABSOLUTE: 0.05 E9/L (ref 0–0.2)
BASOPHILS RELATIVE PERCENT: 0.9 % (ref 0–2)
BENZODIAZEPINE SCREEN, URINE: NOT DETECTED
BILIRUB SERPL-MCNC: 0.4 MG/DL (ref 0–1.2)
BUN BLDV-MCNC: 22 MG/DL (ref 6–23)
CALCIUM SERPL-MCNC: 9.4 MG/DL (ref 8.6–10.2)
CANNABINOID SCREEN URINE: NOT DETECTED
CHLORIDE BLD-SCNC: 98 MMOL/L (ref 98–107)
CO2: 29 MMOL/L (ref 22–29)
COCAINE METABOLITE SCREEN URINE: NOT DETECTED
CREAT SERPL-MCNC: 1 MG/DL (ref 0.5–1)
EOSINOPHILS ABSOLUTE: 0.24 E9/L (ref 0.05–0.5)
EOSINOPHILS RELATIVE PERCENT: 4.4 % (ref 0–6)
FENTANYL SCREEN, URINE: NOT DETECTED
GFR AFRICAN AMERICAN: >60
GFR NON-AFRICAN AMERICAN: 55 ML/MIN/1.73
GLUCOSE BLD-MCNC: 99 MG/DL (ref 74–99)
HCT VFR BLD CALC: 39.9 % (ref 34–48)
HEMOGLOBIN: 13.4 G/DL (ref 11.5–15.5)
IMMATURE GRANULOCYTES #: 0.03 E9/L
IMMATURE GRANULOCYTES %: 0.5 % (ref 0–5)
LYMPHOCYTES ABSOLUTE: 1.54 E9/L (ref 1.5–4)
LYMPHOCYTES RELATIVE PERCENT: 28.1 % (ref 20–42)
Lab: NORMAL
MCH RBC QN AUTO: 30.7 PG (ref 26–35)
MCHC RBC AUTO-ENTMCNC: 33.6 % (ref 32–34.5)
MCV RBC AUTO: 91.3 FL (ref 80–99.9)
METHADONE SCREEN, URINE: NOT DETECTED
MONOCYTES ABSOLUTE: 0.51 E9/L (ref 0.1–0.95)
MONOCYTES RELATIVE PERCENT: 9.3 % (ref 2–12)
NEUTROPHILS ABSOLUTE: 3.11 E9/L (ref 1.8–7.3)
NEUTROPHILS RELATIVE PERCENT: 56.8 % (ref 43–80)
OPIATE SCREEN URINE: NOT DETECTED
OXYCODONE URINE: NOT DETECTED
PDW BLD-RTO: 12.6 FL (ref 11.5–15)
PHENCYCLIDINE SCREEN URINE: NOT DETECTED
PLATELET # BLD: 275 E9/L (ref 130–450)
PMV BLD AUTO: 9.2 FL (ref 7–12)
POTASSIUM SERPL-SCNC: 3.8 MMOL/L (ref 3.5–5)
RBC # BLD: 4.37 E12/L (ref 3.5–5.5)
SODIUM BLD-SCNC: 137 MMOL/L (ref 132–146)
TOTAL PROTEIN: 5.8 G/DL (ref 6.4–8.3)
WBC # BLD: 5.5 E9/L (ref 4.5–11.5)

## 2021-11-04 PROCEDURE — 80307 DRUG TEST PRSMV CHEM ANLYZR: CPT

## 2021-11-04 PROCEDURE — 6360000002 HC RX W HCPCS: Performed by: NURSE ANESTHETIST, CERTIFIED REGISTERED

## 2021-11-04 PROCEDURE — 3700000000 HC ANESTHESIA ATTENDED CARE: Performed by: SURGERY

## 2021-11-04 PROCEDURE — 6370000000 HC RX 637 (ALT 250 FOR IP): Performed by: SURGERY

## 2021-11-04 PROCEDURE — 6360000002 HC RX W HCPCS: Performed by: SURGERY

## 2021-11-04 PROCEDURE — 3700000001 HC ADD 15 MINUTES (ANESTHESIA): Performed by: SURGERY

## 2021-11-04 PROCEDURE — 85347 COAGULATION TIME ACTIVATED: CPT

## 2021-11-04 PROCEDURE — 7100000000 HC PACU RECOVERY - FIRST 15 MIN: Performed by: SURGERY

## 2021-11-04 PROCEDURE — 03CL3ZZ EXTIRPATION OF MATTER FROM LEFT INTERNAL CAROTID ARTERY, PERCUTANEOUS APPROACH: ICD-10-PCS | Performed by: SURGERY

## 2021-11-04 PROCEDURE — 2060000000 HC ICU INTERMEDIATE R&B

## 2021-11-04 PROCEDURE — 3600000005 HC SURGERY LEVEL 5 BASE: Performed by: SURGERY

## 2021-11-04 PROCEDURE — 6370000000 HC RX 637 (ALT 250 FOR IP): Performed by: FAMILY MEDICINE

## 2021-11-04 PROCEDURE — 6370000000 HC RX 637 (ALT 250 FOR IP): Performed by: NURSE PRACTITIONER

## 2021-11-04 PROCEDURE — 35301 RECHANNELING OF ARTERY: CPT | Performed by: SURGERY

## 2021-11-04 PROCEDURE — 2500000003 HC RX 250 WO HCPCS: Performed by: NURSE ANESTHETIST, CERTIFIED REGISTERED

## 2021-11-04 PROCEDURE — 36415 COLL VENOUS BLD VENIPUNCTURE: CPT

## 2021-11-04 PROCEDURE — 2580000003 HC RX 258: Performed by: SURGERY

## 2021-11-04 PROCEDURE — 88304 TISSUE EXAM BY PATHOLOGIST: CPT

## 2021-11-04 PROCEDURE — 6360000002 HC RX W HCPCS: Performed by: NURSE PRACTITIONER

## 2021-11-04 PROCEDURE — 80053 COMPREHEN METABOLIC PANEL: CPT

## 2021-11-04 PROCEDURE — 85025 COMPLETE CBC W/AUTO DIFF WBC: CPT

## 2021-11-04 PROCEDURE — 2000000000 HC ICU R&B

## 2021-11-04 PROCEDURE — 2709999900 HC NON-CHARGEABLE SUPPLY: Performed by: SURGERY

## 2021-11-04 PROCEDURE — 7100000001 HC PACU RECOVERY - ADDTL 15 MIN: Performed by: SURGERY

## 2021-11-04 PROCEDURE — 2500000003 HC RX 250 WO HCPCS: Performed by: SURGERY

## 2021-11-04 PROCEDURE — 6370000000 HC RX 637 (ALT 250 FOR IP): Performed by: STUDENT IN AN ORGANIZED HEALTH CARE EDUCATION/TRAINING PROGRAM

## 2021-11-04 PROCEDURE — 7100000001 HC PACU RECOVERY - ADDTL 15 MIN

## 2021-11-04 PROCEDURE — 3600000015 HC SURGERY LEVEL 5 ADDTL 15MIN: Performed by: SURGERY

## 2021-11-04 PROCEDURE — 7100000000 HC PACU RECOVERY - FIRST 15 MIN

## 2021-11-04 PROCEDURE — 2580000003 HC RX 258: Performed by: NURSE ANESTHETIST, CERTIFIED REGISTERED

## 2021-11-04 RX ORDER — ACETAMINOPHEN 325 MG/1
650 TABLET ORAL EVERY 4 HOURS PRN
Status: DISCONTINUED | OUTPATIENT
Start: 2021-11-04 | End: 2021-11-05 | Stop reason: HOSPADM

## 2021-11-04 RX ORDER — DEXAMETHASONE SODIUM PHOSPHATE 10 MG/ML
INJECTION INTRAMUSCULAR; INTRAVENOUS PRN
Status: DISCONTINUED | OUTPATIENT
Start: 2021-11-04 | End: 2021-11-04 | Stop reason: SDUPTHER

## 2021-11-04 RX ORDER — FENTANYL CITRATE 50 UG/ML
INJECTION, SOLUTION INTRAMUSCULAR; INTRAVENOUS PRN
Status: DISCONTINUED | OUTPATIENT
Start: 2021-11-04 | End: 2021-11-04 | Stop reason: SDUPTHER

## 2021-11-04 RX ORDER — ONDANSETRON 2 MG/ML
INJECTION INTRAMUSCULAR; INTRAVENOUS PRN
Status: DISCONTINUED | OUTPATIENT
Start: 2021-11-04 | End: 2021-11-04 | Stop reason: SDUPTHER

## 2021-11-04 RX ORDER — CEFAZOLIN SODIUM 1 G/3ML
INJECTION, POWDER, FOR SOLUTION INTRAMUSCULAR; INTRAVENOUS PRN
Status: DISCONTINUED | OUTPATIENT
Start: 2021-11-04 | End: 2021-11-04 | Stop reason: SDUPTHER

## 2021-11-04 RX ORDER — SODIUM CHLORIDE 9 MG/ML
INJECTION, SOLUTION INTRAVENOUS CONTINUOUS PRN
Status: DISCONTINUED | OUTPATIENT
Start: 2021-11-04 | End: 2021-11-04 | Stop reason: SDUPTHER

## 2021-11-04 RX ORDER — PROTAMINE SULFATE 10 MG/ML
INJECTION, SOLUTION INTRAVENOUS PRN
Status: DISCONTINUED | OUTPATIENT
Start: 2021-11-04 | End: 2021-11-04 | Stop reason: SDUPTHER

## 2021-11-04 RX ORDER — LIDOCAINE HYDROCHLORIDE 20 MG/ML
INJECTION, SOLUTION INTRAVENOUS PRN
Status: DISCONTINUED | OUTPATIENT
Start: 2021-11-04 | End: 2021-11-04 | Stop reason: SDUPTHER

## 2021-11-04 RX ORDER — PROPOFOL 10 MG/ML
INJECTION, EMULSION INTRAVENOUS PRN
Status: DISCONTINUED | OUTPATIENT
Start: 2021-11-04 | End: 2021-11-04 | Stop reason: SDUPTHER

## 2021-11-04 RX ORDER — EPHEDRINE SULFATE/0.9% NACL/PF 50 MG/5 ML
SYRINGE (ML) INTRAVENOUS PRN
Status: DISCONTINUED | OUTPATIENT
Start: 2021-11-04 | End: 2021-11-04 | Stop reason: SDUPTHER

## 2021-11-04 RX ORDER — OXYCODONE HYDROCHLORIDE 5 MG/1
5 TABLET ORAL
Status: DISCONTINUED | OUTPATIENT
Start: 2021-11-04 | End: 2021-11-05 | Stop reason: HOSPADM

## 2021-11-04 RX ORDER — SODIUM CHLORIDE 9 MG/ML
25 INJECTION, SOLUTION INTRAVENOUS PRN
Status: DISCONTINUED | OUTPATIENT
Start: 2021-11-04 | End: 2021-11-05 | Stop reason: HOSPADM

## 2021-11-04 RX ORDER — ROCURONIUM BROMIDE 10 MG/ML
INJECTION, SOLUTION INTRAVENOUS PRN
Status: DISCONTINUED | OUTPATIENT
Start: 2021-11-04 | End: 2021-11-04 | Stop reason: SDUPTHER

## 2021-11-04 RX ORDER — SODIUM CHLORIDE 0.9 % (FLUSH) 0.9 %
5-40 SYRINGE (ML) INJECTION PRN
Status: CANCELLED | OUTPATIENT
Start: 2021-11-04

## 2021-11-04 RX ORDER — HEPARIN SODIUM 1000 [USP'U]/ML
INJECTION, SOLUTION INTRAVENOUS; SUBCUTANEOUS PRN
Status: DISCONTINUED | OUTPATIENT
Start: 2021-11-04 | End: 2021-11-04 | Stop reason: SDUPTHER

## 2021-11-04 RX ORDER — BUPIVACAINE HYDROCHLORIDE 2.5 MG/ML
INJECTION, SOLUTION EPIDURAL; INFILTRATION; INTRACAUDAL PRN
Status: DISCONTINUED | OUTPATIENT
Start: 2021-11-04 | End: 2021-11-05 | Stop reason: HOSPADM

## 2021-11-04 RX ORDER — SODIUM CHLORIDE 9 MG/ML
INJECTION, SOLUTION INTRAVENOUS CONTINUOUS
Status: ACTIVE | OUTPATIENT
Start: 2021-11-04 | End: 2021-11-05

## 2021-11-04 RX ORDER — SODIUM CHLORIDE 9 MG/ML
25 INJECTION, SOLUTION INTRAVENOUS PRN
Status: CANCELLED | OUTPATIENT
Start: 2021-11-04

## 2021-11-04 RX ORDER — SODIUM CHLORIDE 0.9 % (FLUSH) 0.9 %
10 SYRINGE (ML) INJECTION PRN
Status: DISCONTINUED | OUTPATIENT
Start: 2021-11-04 | End: 2021-11-05 | Stop reason: HOSPADM

## 2021-11-04 RX ORDER — SODIUM CHLORIDE 0.9 % (FLUSH) 0.9 %
5-40 SYRINGE (ML) INJECTION EVERY 12 HOURS SCHEDULED
Status: CANCELLED | OUTPATIENT
Start: 2021-11-04

## 2021-11-04 RX ORDER — MIDAZOLAM HYDROCHLORIDE 1 MG/ML
INJECTION INTRAMUSCULAR; INTRAVENOUS PRN
Status: DISCONTINUED | OUTPATIENT
Start: 2021-11-04 | End: 2021-11-04 | Stop reason: SDUPTHER

## 2021-11-04 RX ORDER — CLOPIDOGREL BISULFATE 75 MG/1
75 TABLET ORAL DAILY
Status: DISCONTINUED | OUTPATIENT
Start: 2021-11-05 | End: 2021-11-04

## 2021-11-04 RX ORDER — SODIUM CHLORIDE 0.9 % (FLUSH) 0.9 %
10 SYRINGE (ML) INJECTION EVERY 12 HOURS SCHEDULED
Status: DISCONTINUED | OUTPATIENT
Start: 2021-11-04 | End: 2021-11-05 | Stop reason: HOSPADM

## 2021-11-04 RX ORDER — CEFAZOLIN SODIUM 1 G/3ML
INJECTION, POWDER, FOR SOLUTION INTRAMUSCULAR; INTRAVENOUS PRN
Status: DISCONTINUED | OUTPATIENT
Start: 2021-11-04 | End: 2021-11-04

## 2021-11-04 RX ORDER — SODIUM CHLORIDE, SODIUM LACTATE, POTASSIUM CHLORIDE, CALCIUM CHLORIDE 600; 310; 30; 20 MG/100ML; MG/100ML; MG/100ML; MG/100ML
INJECTION, SOLUTION INTRAVENOUS CONTINUOUS
Status: CANCELLED | OUTPATIENT
Start: 2021-11-04

## 2021-11-04 RX ADMIN — LORAZEPAM 1 MG: 1 TABLET ORAL at 20:39

## 2021-11-04 RX ADMIN — MIDAZOLAM 1 MG: 1 INJECTION INTRAMUSCULAR; INTRAVENOUS at 11:08

## 2021-11-04 RX ADMIN — Medication 5 MG: at 11:35

## 2021-11-04 RX ADMIN — ROCURONIUM BROMIDE 40 MG: 10 INJECTION, SOLUTION INTRAVENOUS at 11:17

## 2021-11-04 RX ADMIN — ACETAMINOPHEN 650 MG: 325 TABLET ORAL at 08:24

## 2021-11-04 RX ADMIN — PROPOFOL 120 MG: 10 INJECTION, EMULSION INTRAVENOUS at 11:17

## 2021-11-04 RX ADMIN — HEPARIN SODIUM 4000 UNITS: 1000 INJECTION INTRAVENOUS; SUBCUTANEOUS at 12:18

## 2021-11-04 RX ADMIN — SODIUM CHLORIDE: 9 INJECTION, SOLUTION INTRAVENOUS at 11:08

## 2021-11-04 RX ADMIN — LIDOCAINE HYDROCHLORIDE 40 MG: 20 INJECTION, SOLUTION INTRAVENOUS at 11:17

## 2021-11-04 RX ADMIN — SUGAMMADEX 308 MG: 100 INJECTION, SOLUTION INTRAVENOUS at 13:30

## 2021-11-04 RX ADMIN — FENTANYL CITRATE 25 MCG: 50 INJECTION, SOLUTION INTRAMUSCULAR; INTRAVENOUS at 12:26

## 2021-11-04 RX ADMIN — FENTANYL CITRATE 25 MCG: 50 INJECTION, SOLUTION INTRAMUSCULAR; INTRAVENOUS at 12:24

## 2021-11-04 RX ADMIN — BUSPIRONE HYDROCHLORIDE 15 MG: 10 TABLET ORAL at 08:21

## 2021-11-04 RX ADMIN — MIDAZOLAM 1 MG: 1 INJECTION INTRAMUSCULAR; INTRAVENOUS at 10:50

## 2021-11-04 RX ADMIN — FENTANYL CITRATE 25 MCG: 50 INJECTION, SOLUTION INTRAMUSCULAR; INTRAVENOUS at 11:54

## 2021-11-04 RX ADMIN — Medication 10 MG: at 12:03

## 2021-11-04 RX ADMIN — Medication 10 MG: at 11:47

## 2021-11-04 RX ADMIN — ONDANSETRON HYDROCHLORIDE 4 MG: 2 INJECTION, SOLUTION INTRAMUSCULAR; INTRAVENOUS at 13:16

## 2021-11-04 RX ADMIN — CEFAZOLIN 2000 MG: 1 INJECTION, POWDER, FOR SOLUTION INTRAMUSCULAR; INTRAVENOUS at 11:40

## 2021-11-04 RX ADMIN — OXYCODONE 5 MG: 5 TABLET ORAL at 17:04

## 2021-11-04 RX ADMIN — ROCURONIUM BROMIDE 10 MG: 10 INJECTION, SOLUTION INTRAVENOUS at 12:30

## 2021-11-04 RX ADMIN — FENTANYL CITRATE 50 MCG: 50 INJECTION, SOLUTION INTRAMUSCULAR; INTRAVENOUS at 11:17

## 2021-11-04 RX ADMIN — HEPARIN SODIUM 8000 UNITS: 1000 INJECTION INTRAVENOUS; SUBCUTANEOUS at 12:06

## 2021-11-04 RX ADMIN — DEXAMETHASONE SODIUM PHOSPHATE 10 MG: 10 INJECTION INTRAMUSCULAR; INTRAVENOUS at 11:20

## 2021-11-04 RX ADMIN — PROTAMINE SULFATE 30 MG: 10 INJECTION, SOLUTION INTRAVENOUS at 13:06

## 2021-11-04 RX ADMIN — Medication 5 MG: at 11:34

## 2021-11-04 RX ADMIN — SODIUM CHLORIDE 5 MG/HR: 9 INJECTION, SOLUTION INTRAVENOUS at 16:57

## 2021-11-04 RX ADMIN — OXYCODONE 5 MG: 5 TABLET ORAL at 20:39

## 2021-11-04 RX ADMIN — Medication 10 MG: at 11:40

## 2021-11-04 RX ADMIN — FENTANYL CITRATE 50 MCG: 50 INJECTION, SOLUTION INTRAMUSCULAR; INTRAVENOUS at 11:49

## 2021-11-04 RX ADMIN — PHENYLEPHRINE HYDROCHLORIDE 20 MCG/MIN: 10 INJECTION, SOLUTION INTRAMUSCULAR; INTRAVENOUS; SUBCUTANEOUS at 12:05

## 2021-11-04 ASSESSMENT — PULMONARY FUNCTION TESTS
PIF_VALUE: 21
PIF_VALUE: 20
PIF_VALUE: 21
PIF_VALUE: 11
PIF_VALUE: 21
PIF_VALUE: 22
PIF_VALUE: 14
PIF_VALUE: 20
PIF_VALUE: 14
PIF_VALUE: 20
PIF_VALUE: 22
PIF_VALUE: 19
PIF_VALUE: 11
PIF_VALUE: 20
PIF_VALUE: 13
PIF_VALUE: 11
PIF_VALUE: 21
PIF_VALUE: 14
PIF_VALUE: 15
PIF_VALUE: 19
PIF_VALUE: 0
PIF_VALUE: 21
PIF_VALUE: 15
PIF_VALUE: 22
PIF_VALUE: 17
PIF_VALUE: 11
PIF_VALUE: 14
PIF_VALUE: 19
PIF_VALUE: 0
PIF_VALUE: 14
PIF_VALUE: 20
PIF_VALUE: 19
PIF_VALUE: 14
PIF_VALUE: 20
PIF_VALUE: 3
PIF_VALUE: 19
PIF_VALUE: 21
PIF_VALUE: 19
PIF_VALUE: 11
PIF_VALUE: 21
PIF_VALUE: 0
PIF_VALUE: 20
PIF_VALUE: 21
PIF_VALUE: 8
PIF_VALUE: 21
PIF_VALUE: 20
PIF_VALUE: 14
PIF_VALUE: 14
PIF_VALUE: 11
PIF_VALUE: 21
PIF_VALUE: 16
PIF_VALUE: 16
PIF_VALUE: 2
PIF_VALUE: 18
PIF_VALUE: 14
PIF_VALUE: 16
PIF_VALUE: 14
PIF_VALUE: 14
PIF_VALUE: 10
PIF_VALUE: 11
PIF_VALUE: 19
PIF_VALUE: 21
PIF_VALUE: 19
PIF_VALUE: 21
PIF_VALUE: 16
PIF_VALUE: 16
PIF_VALUE: 22
PIF_VALUE: 21
PIF_VALUE: 14
PIF_VALUE: 22
PIF_VALUE: 22
PIF_VALUE: 0
PIF_VALUE: 11
PIF_VALUE: 19
PIF_VALUE: 14
PIF_VALUE: 20
PIF_VALUE: 0
PIF_VALUE: 16
PIF_VALUE: 30
PIF_VALUE: 13
PIF_VALUE: 14
PIF_VALUE: 22
PIF_VALUE: 19
PIF_VALUE: 21
PIF_VALUE: 19
PIF_VALUE: 20
PIF_VALUE: 24
PIF_VALUE: 16
PIF_VALUE: 13
PIF_VALUE: 19
PIF_VALUE: 19
PIF_VALUE: 14
PIF_VALUE: 21
PIF_VALUE: 0
PIF_VALUE: 16
PIF_VALUE: 21
PIF_VALUE: 21
PIF_VALUE: 11
PIF_VALUE: 19
PIF_VALUE: 21
PIF_VALUE: 11
PIF_VALUE: 21
PIF_VALUE: 16
PIF_VALUE: 20
PIF_VALUE: 14
PIF_VALUE: 19
PIF_VALUE: 20
PIF_VALUE: 14
PIF_VALUE: 21
PIF_VALUE: 20
PIF_VALUE: 21
PIF_VALUE: 19
PIF_VALUE: 19
PIF_VALUE: 15
PIF_VALUE: 21
PIF_VALUE: 18
PIF_VALUE: 21
PIF_VALUE: 21
PIF_VALUE: 20
PIF_VALUE: 15
PIF_VALUE: 14
PIF_VALUE: 11
PIF_VALUE: 19
PIF_VALUE: 14
PIF_VALUE: 10
PIF_VALUE: 13
PIF_VALUE: 21
PIF_VALUE: 21
PIF_VALUE: 11
PIF_VALUE: 21
PIF_VALUE: 16
PIF_VALUE: 0
PIF_VALUE: 13
PIF_VALUE: 21
PIF_VALUE: 21
PIF_VALUE: 19
PIF_VALUE: 21
PIF_VALUE: 1
PIF_VALUE: 20
PIF_VALUE: 20
PIF_VALUE: 19
PIF_VALUE: 16
PIF_VALUE: 20
PIF_VALUE: 19
PIF_VALUE: 13
PIF_VALUE: 19

## 2021-11-04 ASSESSMENT — PAIN DESCRIPTION - PAIN TYPE
TYPE: ACUTE PAIN
TYPE: ACUTE PAIN

## 2021-11-04 ASSESSMENT — PAIN SCALES - GENERAL
PAINLEVEL_OUTOF10: 0
PAINLEVEL_OUTOF10: 6
PAINLEVEL_OUTOF10: 6
PAINLEVEL_OUTOF10: 0
PAINLEVEL_OUTOF10: 0
PAINLEVEL_OUTOF10: 5
PAINLEVEL_OUTOF10: 6
PAINLEVEL_OUTOF10: 0

## 2021-11-04 ASSESSMENT — PAIN DESCRIPTION - LOCATION
LOCATION: HEAD
LOCATION: HEAD

## 2021-11-04 ASSESSMENT — PAIN DESCRIPTION - DESCRIPTORS: DESCRIPTORS: HEADACHE

## 2021-11-04 NOTE — PROGRESS NOTES
Hospitalist Progress Note      SYNOPSIS: Patient admitted on 10/31/2021 for Acute left posterior parietal acute infarct    SUBJECTIVE:  Patient seen and examined chart reviewed discussed with nursing staff. No overnight events reported. Patient still have word finding difficulties. No other complaints from patient. Denies any chest pain or shortness of breath or dizziness at this time. Evaluated by vascular surgery and cardiology. Plan for left carotid enterectomy later today      Temp (24hrs), Av.6 °F (35.9 °C), Min:95.9 °F (35.5 °C), Max:98.4 °F (36.9 °C)    DIET: Diet NPO Exceptions are: Sips of Water with Meds  CODE: Full Code    Intake/Output Summary (Last 24 hours) at 2021 1446  Last data filed at 2021 1346  Gross per 24 hour   Intake 1300 ml   Output 185 ml   Net 1115 ml       OBJECTIVE:    BP (!) 144/58   Pulse 76   Temp 97.7 °F (36.5 °C)   Resp 17   Ht 5' 3\" (1.6 m)   Wt 170 lb (77.1 kg)   SpO2 96%   BMI 30.11 kg/m²     General appearance: No apparent distress, appears stated age and cooperative. HEENT:  Conjunctivae/corneas clear. Neck: Supple. No jugular venous distention. Respiratory: Clear to auscultation bilaterally, normal respiratory effort  Cardiovascular: Regular rate rhythm, normal S1-S2  Abdomen: Soft, nontender, nondistended  Musculoskeletal: No clubbing, cyanosis, no bilateral lower extremity edema. Brisk capillary refill. Skin:  No rashes  on visible skin  Neurologic: awake, alert and following commands     ASSESSMENT:  Principal Problem:    Symptomatic stenosis of left carotid artery  Active Problems:    Tobacco dependence    Bilateral carotid artery stenosis    PVD (peripheral vascular disease) with claudication (HCC)    Acute CVA (cerebrovascular accident) ([facility] [address]    HTN (hypertension)    Visual field defects  Resolved Problems:    * No resolved hospital problems.  *     PLAN:  - LKW on 10/30/2021 at 5:00pm  - Pt out of window for tPA on admission  - [facility] suspicious for small acute left posterior parietal acute infarct  - Lipid panel, Hemoglobin A1c, and alcohol level reviewed  - F/U RPR, Homocysteine\  - Obtain Echocardiogram with bubble study  - Pt with EKG showing Sinus Behzad  - Maintain on tele  - If HR continues to vary will obtain EP consult  - Pt likely to benefit from holter monitor at discharge if no arrhthymias captured during inpatient stay to r/o PAF  - Neurology consult  - TTE with bubble  - NIHSS  - DAPT with ASA and Plavix  - Cont on Lipitor 80, decrease to 40 in 4 days; repeat lipid panel in 4 weeks  - BP Control (SBP: 140-160)  - Maintain normothermia with Tylenol PRN and early antibiotics if indicated  - Avoid hyperglycemia; monitor finger stick < 200  - Pt without any notable focal neurologic deficits at current  - Regular diet  - Prophylaxis of complication of stroke              Early mobilization with out of bed to chair and upgrade. PT/OT. Elevate head of bed and oral hygiene to prevent aspirations; Aspiration precaution              Speech and swallow evaluation ASAP to guide oral feeding              Rehab consult              Avoid indwelling bowman catheters              Depression prevention              Dietary consult to adjust lifestyle risk factors for repeated CVA  Echo pending. Vascular surgery consulted for symptomatic carotid stenosis  Plan for OR today   Asymptomatic bradycardia, will decrease metoprolol dose  Patient will need Holter monitor on discharge.   DISPOSITION: intermediate    Medications:  REVIEWED DAILY    Infusion Medications    sodium chloride 125 mL/hr at 11/04/21 1402    sodium chloride      niCARdipine       Scheduled Medications    sodium chloride flush  10 mL IntraVENous 2 times per day    ceFAZolin  2,000 mg IntraVENous Q8H    baclofen  10 mg Oral BID    metoprolol tartrate  12.5 mg Oral BID    gabapentin  300 mg Oral TID    hydroCHLOROthiazide  25 mg Oral Daily    lisinopril  40 mg Oral Daily    sertraline  50 mg Oral Daily    nicotine  1 patch TransDERmal Daily    atorvastatin  80 mg Oral Nightly    aspirin  81 mg Oral Daily     PRN Meds: heparin irrigation, bupivacaine (PF), sodium chloride flush, sodium chloride, oxyCODONE, acetaminophen, LORazepam, ondansetron **OR** ondansetron, labetalol    Labs:     Recent Labs     11/02/21  1050 11/03/21  0558 11/04/21  0531   WBC 9.8 6.5 5.5   HGB 14.0 13.8 13.4   HCT 42.0 41.0 39.9    302 275       Recent Labs     11/02/21  1050 11/03/21  0558 11/04/21  0531    138 137   K 3.7 4.0 3.8    98 98   CO2 26 25 29   BUN 32* 25* 22   CREATININE 1.3* 1.1* 1.0   CALCIUM 9.8 9.3 9.4       Recent Labs     11/02/21  1050 11/03/21  0558 11/04/21  0531   PROT 6.8 5.9* 5.8*   ALKPHOS 93 80 80   ALT 11 10 10   AST 15 17 15   BILITOT 0.3 0.4 0.4       Recent Labs     11/03/21  0558   INR 0.9       No results for input(s): Yani Govea in the last 72 hours. Chronic labs:    Lab Results   Component Value Date    CHOL 303 (H) 11/01/2021    TRIG 133 11/01/2021    HDL 47 11/01/2021    LDLCALC 229 (H) 11/01/2021    TSH 2.440 10/31/2021    INR 0.9 11/03/2021    LABA1C 5.8 (H) 11/03/2021       Radiology: REVIEWED DAILY    +++++++++++++++++++++++++++++++++++++++++++++++++  charu pryor MD  [employer] - [facility], [place]  +++++++++++++++++++++++++++++++++++++++++++++++++  NOTE: This report was transcribed using voice recognition software. Every effort was made to ensure accuracy; however, inadvertent computerized transcription errors may be present.

## 2021-11-04 NOTE — OP NOTE
Operative Note      Patient: Dayne Scott  YOB: 1955  MRN: [id number]    Date of Procedure: 11/4/2021    Pre-Op Diagnosis: Symptomatic stenosis of the left carotid artery. Post-Op Diagnosis: Same       Procedure(s):  LEFT CAROTID ENDARTERECTOMY    Surgeon(s):  Dania Medina MD    Assistant:   Surgical Assistant: Donold Habermann  Resident: Jagdish Benedict MD    Anesthesia: General    Estimated Blood Loss (mL): Minimal    Complications: None    Specimens:   ID Type Source Tests Collected by Time Destination   A : LEFT CAROTID PLAQUE Tissue Tissue SURGICAL PATHOLOGY Dania Medina MD 11/4/2021 1228        Implants:  * No implants in log *      Drains:   Urethral Catheter Double-lumen 16 fr (Active)       Findings:     Detailed Description of Procedure:     DESCRIPTION OF PROCEDURE: The patient was identified and the procedure was confirmed. The left neck was prepped and draped in the usual sterile fashion. A skin incision was made along the anterior border of the sternocleidomastoid muscle and carried down through the subcutaneous tissue. Dissection continued through the platysma and along the anterior border of the sternocleidomastoid muscle. The common facial vein was divided between silk ties. The common carotid artery was identified and dissected free from the surrounding tissues. It was surrounded proximally in the soft portion with an umbilical tape. The vagus nerve was deep to the artery and preserved. Dissection continued along the carotid artery and the external carotid artery and its superior thyroid branch were dissected free from the surrounding tissues and surrounded with vessel loops for control. The patient was then heparinized, maintaining an activated clotting time greater than 300 seconds. Dissection continued along the internal carotid artery, which was freed from the surrounding tissues and surrounded with a vessel loop for control.   The hypoglossal nerve was was transferred to the Cardiovascular ICU in satisfactory condition.       Electronically signed by Manju Rodríguez MD on 11/4/2021 at 1:27 PM

## 2021-11-04 NOTE — BRIEF OP NOTE
Brief Postoperative Note      Patient: Kilo Harden  YOB: 1955  MRN: [id number]    Date of Procedure: 11/4/2021    Pre-Op Diagnosis: Left carotid stenosis.     Post-Op Diagnosis: Same       Procedure(s):  LEFT CAROTID ENDARTERECTOMY    Surgeon(s):  Carlene Chris MD    Assistant:  Surgical Assistant: Kary Hagen  Resident: Emily Pinedo MD    Anesthesia: General    Estimated Blood Loss (mL): Minimal    Complications: None    Specimens:   ID Type Source Tests Collected by Time Destination   A : LEFT CAROTID PLAQUE Tissue Tissue SURGICAL PATHOLOGY Carlene Chris MD 11/4/2021 1228        Implants:  * No implants in log *      Drains:   Urethral Catheter Double-lumen 16 fr (Active)       Findings:     Electronically signed by Carlene Chris MD on 11/4/2021 at 1:26 PM

## 2021-11-04 NOTE — PROGRESS NOTES
CVICU Admission Note    Name: Som Dubois  MRN: [id number]    CC: Postoperative Critical Care Management     Indication for Surgery/Procedure: Left carotid stenosis     Important/Relevant PMH/PSH: bilateral carotid stenosis, tobacco abuse, anxiety, back pain, depression, HLD, HTN, PVD, ulcer left great toe, GERD, thyroid disease     Procedure/Surgeries: 11/4/2021 Left carotid endarterectomy       Physical Exam:    BP (!) 146/76   Pulse 84   Temp 97.7 °F (36.5 °C) (Temporal)   Resp 18   Ht 5' 3\" (1.6 m)   Wt 170 lb (77.1 kg)   SpO2 92%   BMI 30.11 kg/m²     Recent Labs     11/04/21  0531   WBC 5.5   RBC 4.37   HGB 13.4   HCT 39.9   MCV 91.3   MCH 30.7   MCHC 33.6   RDW 12.6      MPV 9.2     Recent Labs     11/04/21  0531      K 3.8   CL 98   CO2 29   BUN 22   CREATININE 1.0   GLUCOSE 99   CALCIUM 9.4     General Appearance: Arrived to PACU in stable condition   Eyes: PERRL  Pulmonary: No wheezes, no accessory muscle use noted   Cardiovascular: RRR, no heaves or thrills palpated   Telemetry: SR  Neurologic/Psych: Alert and oriented, FORMAN equally in strength, tongue midline  Skin: Warm and dry    Incision: Left neck incision well approximated     Assessment/Plan: Day of Surgery     1.  S/p Left CEA   - Frequent neurovascular checks, monitor incision for signs of swelling/hematoma   - aspirin  - NPO, IVF @125cc/hr  - Navaror catheter to GD  - Bedrest  - Ancef          Electronically signed by YAEL Lamar - CNP on 11/4/2021 at 1:50 PM

## 2021-11-04 NOTE — PLAN OF CARE
Problem: HEMODYNAMIC STATUS  Goal: Patient has stable vital signs and fluid balance  11/3/2021 1644 by Aaron Kim RN  Outcome: Met This Shift     Problem: ACTIVITY INTOLERANCE/IMPAIRED MOBILITY  Goal: Mobility/activity is maintained at optimum level for patient  11/3/2021 1644 by Aaron Kim RN  Outcome: Met This Shift     Problem: COMMUNICATION IMPAIRMENT  Goal: Ability to express needs and understand communication  11/3/2021 [phone] by Aaron Kim RN  Outcome: Met This Shift     Problem: Falls - Risk of:  Goal: Will remain free from falls  Description: Will remain free from falls  11/4/2021 0006 by Tanika Pitts RN  Outcome: Met This Shift  11/3/2021 1644 by Aaron Kim RN  Outcome: Met This Shift  Goal: Absence of physical injury  Description: Absence of physical injury  11/4/2021 0006 by Tanika Pitts RN  Outcome: Met This Shift  11/3/2021 1644 by Aaron Kim RN  Outcome: Met This Shift

## 2021-11-04 NOTE — ANESTHESIA PROCEDURE NOTES
Arterial Line:    An arterial line was placed using surface landmarks, in the OR for the following indication(s): continuous blood pressure monitoring and blood sampling needed. A 20 gauge (size), 12 cm (length), Arrow (type) catheter was placed, Seldinger technique used, into the right brachial artery, secured by tape and Tegaderm. Anesthesia type: Local  Local infiltration: Injection    Events:  patient tolerated procedure well with no complications and EBL < 5mL.   Anesthesiologist: Janine Vazquez MD  Performed: Anesthesiologist   Preanesthetic Checklist  Completed: patient identified, IV checked, site marked, risks and benefits discussed, surgical consent, monitors and equipment checked, pre-op evaluation, timeout performed, anesthesia consent given, oxygen available and patient being monitored

## 2021-11-04 NOTE — CARE COORDINATION
Cardiac clearance received. Plan for left CEA today with Dr Alonzo Lozano for lft carotid stenosis of 90-99% on ultrasound. Patient to likely transfer to ICU s/p OR. Patient independent with therapy prior to procedure and transition of care plan a this time is to home, with no needs identified at this time. MRI positive for multiple strokes. Per Neurology patient will be on DAPT for 21 days then ASA in definitely, baclofen started 10 mg BID, and statin therapy. Will continue to follow.      Carey Durham RN.  [facility]  [phone]

## 2021-11-05 VITALS
HEIGHT: 63 IN | BODY MASS INDEX: 30.12 KG/M2 | SYSTOLIC BLOOD PRESSURE: 119 MMHG | DIASTOLIC BLOOD PRESSURE: 55 MMHG | TEMPERATURE: 98.8 F | RESPIRATION RATE: 19 BRPM | WEIGHT: 170 LBS | HEART RATE: 60 BPM | OXYGEN SATURATION: 89 %

## 2021-11-05 LAB
POC ACT LR: 142 SECONDS
POC ACT LR: 238 SECONDS
POC ACT LR: 254 SECONDS
POC ACT LR: 325 SECONDS

## 2021-11-05 PROCEDURE — 6370000000 HC RX 637 (ALT 250 FOR IP): Performed by: SURGERY

## 2021-11-05 PROCEDURE — 6360000002 HC RX W HCPCS: Performed by: NURSE PRACTITIONER

## 2021-11-05 PROCEDURE — 2500000003 HC RX 250 WO HCPCS: Performed by: SURGERY

## 2021-11-05 RX ORDER — ATORVASTATIN CALCIUM 80 MG/1
80 TABLET, FILM COATED ORAL NIGHTLY
Qty: 30 TABLET | Refills: 3 | Status: SHIPPED | OUTPATIENT
Start: 2021-11-05 | End: 2022-01-25 | Stop reason: SDUPTHER

## 2021-11-05 RX ORDER — OXYCODONE HYDROCHLORIDE 5 MG/1
5 TABLET ORAL EVERY 6 HOURS PRN
Qty: 12 TABLET | Refills: 0 | Status: SHIPPED | OUTPATIENT
Start: 2021-11-05 | End: 2021-11-08

## 2021-11-05 ASSESSMENT — PAIN DESCRIPTION - DESCRIPTORS
DESCRIPTORS: HEADACHE
DESCRIPTORS: HEADACHE

## 2021-11-05 ASSESSMENT — PAIN DESCRIPTION - PAIN TYPE: TYPE: ACUTE PAIN

## 2021-11-05 ASSESSMENT — PAIN SCALES - GENERAL
PAINLEVEL_OUTOF10: 7
PAINLEVEL_OUTOF10: 3
PAINLEVEL_OUTOF10: 0

## 2021-11-05 ASSESSMENT — PAIN DESCRIPTION - FREQUENCY: FREQUENCY: CONTINUOUS

## 2021-11-05 ASSESSMENT — PAIN - FUNCTIONAL ASSESSMENT: PAIN_FUNCTIONAL_ASSESSMENT: ACTIVITIES ARE NOT PREVENTED

## 2021-11-05 ASSESSMENT — PAIN DESCRIPTION - LOCATION
LOCATION: HEAD
LOCATION: HEAD

## 2021-11-05 ASSESSMENT — PAIN DESCRIPTION - ORIENTATION: ORIENTATION: ANTERIOR

## 2021-11-05 NOTE — PLAN OF CARE
Problem: HEMODYNAMIC STATUS  Goal: Patient has stable vital signs and fluid balance  Outcome: Met This Shift     Problem: ACTIVITY INTOLERANCE/IMPAIRED MOBILITY  Goal: Mobility/activity is maintained at optimum level for patient  Outcome: Met This Shift     Problem: COMMUNICATION IMPAIRMENT  Goal: Ability to express needs and understand communication  Outcome: Met This Shift     Problem: Falls - Risk of:  Goal: Will remain free from falls  Description: Will remain free from falls  Outcome: Met This Shift  Goal: Absence of physical injury  Description: Absence of physical injury  Outcome: Met This Shift     Problem: Neurological  Goal: Maximum potential motor/sensory/cognitive function  Outcome: Ongoing     Problem: Pain:  Goal: Pain level will decrease  Description: Pain level will decrease  Outcome: Met This Shift  Goal: Control of acute pain  Description: Control of acute pain  Outcome: Met This Shift  Goal: Control of chronic pain  Description: Control of chronic pain  Outcome: Met This Shift

## 2021-11-05 NOTE — PLAN OF CARE
Problem: HEMODYNAMIC STATUS  Goal: Patient has stable vital signs and fluid balance  11/5/2021 1335 by Brad Franco RN  Outcome: Completed  11/5/2021 1057 by Brad Franco RN  Outcome: Met This Shift     Problem: ACTIVITY INTOLERANCE/IMPAIRED MOBILITY  Goal: Mobility/activity is maintained at optimum level for patient  11/5/2021 1335 by Brad Franco RN  Outcome: Completed  11/5/2021 1057 by Brad Franco RN  Outcome: Met This Shift     Problem: COMMUNICATION IMPAIRMENT  Goal: Ability to express needs and understand communication  11/5/2021 1335 by Brad Franco RN  Outcome: Completed  11/5/2021 1057 by Brad Franco RN  Outcome: Met This Shift     Problem: Falls - Risk of:  Goal: Will remain free from falls  Description: Will remain free from falls  11/5/2021 1335 by Brad Franco RN  Outcome: Completed  11/5/2021 1057 by Brad Franco RN  Outcome: Met This Shift  Goal: Absence of physical injury  Description: Absence of physical injury  11/5/2021 1335 by Brad Franco RN  Outcome: Completed  11/5/2021 1057 by Brad Franco RN  Outcome: Met This Shift     Problem: Falls - Risk of:  Goal: Will remain free from falls  Description: Will remain free from falls  11/5/2021 1335 by Brad Franco RN  Outcome: Completed  11/5/2021 1057 by Brad Franco RN  Outcome: Met This Shift  Goal: Absence of physical injury  Description: Absence of physical injury  11/5/2021 1335 by Brad Franco RN  Outcome: Completed  11/5/2021 1057 by Brad Franco RN  Outcome: Met This Shift     Problem: Neurological  Goal: Maximum potential motor/sensory/cognitive function  11/5/2021 1335 by Brad Franco RN  Outcome: Completed  11/5/2021 1057 by Brad Franco RN  Outcome: Ongoing     Problem: Pain:  Goal: Pain level will decrease  Description: Pain level will decrease  11/5/2021 1335 by Brad Franco RN  Outcome: Completed  11/5/2021 1057 by Brad Franco RN  Outcome: Met This Shift  Goal: Control of acute pain  Description: Control of acute pain  11/5/2021 1335 by Sarah Beth Lamas RN  Outcome: Completed  11/5/2021 1057 by Sarah Beth Lamas RN  Outcome: Met This Shift  Goal: Control of chronic pain  Description: Control of chronic pain  11/5/2021 1335 by Sarah Beth Lamas RN  Outcome: Completed  11/5/2021 1057 by Sarah Beth Lamas RN  Outcome: Met This Shift

## 2021-11-05 NOTE — PROGRESS NOTES
Vascular Surgery Progress Note    CC: Symptomatic L ICA stenosis    HISTORY:  The patient is awake, alert, and oriented. Denies new symptoms of lateralized weakness, slurred speech, or amaurosis fugax. Pain controlled. IMPRESSION:  POD # 1 s/p L CEA    PLAN: Wean off O2. Advance diet. Ambulate. Continue ASA 81 mg. OK for discharge from vascular standpoint. Discharge instructions reviewed. F/u in 2 weeks. Plan reviewed with Dr. Pamela Kaur. Agree. Instructions reviewed. Continue aspirin on discharge. Follow-up 2 weeks.     Patient Active Problem List   Diagnosis Code    Radiculopathy, lumbar region M54.16    Spinal stenosis, lumbar region, without neurogenic claudication M48.061    Tobacco dependence F17.200    Discoloration of skin of toe L81.9    Difficulty walking R26.2    Decreased dorsalis pedis pulse R09.89    Bilateral carotid artery stenosis I65.23    PVD (peripheral vascular disease) with claudication ([facility]) I73.9    Acute CVA (cerebrovascular accident) ([facility] [address] I63.9    HTN (hypertension) I10    Visual field defects H53.40    Symptomatic stenosis of left carotid artery I65.22       Current Medications:    sodium chloride        heparin irrigation, bupivacaine (PF), sodium chloride flush, sodium chloride, oxyCODONE, acetaminophen, LORazepam, ondansetron **OR** ondansetron, labetalol    sodium chloride flush  10 mL IntraVENous 2 times per day    baclofen  10 mg Oral BID    metoprolol tartrate  12.5 mg Oral BID    gabapentin  300 mg Oral TID    hydroCHLOROthiazide  25 mg Oral Daily    lisinopril  40 mg Oral Daily    sertraline  50 mg Oral Daily    nicotine  1 patch TransDERmal Daily    atorvastatin  80 mg Oral Nightly    aspirin  81 mg Oral Daily          PHYSICAL EXAM:    Vitals:    11/05/21 0815   BP: (!) 153/73   Pulse: 63   Resp: 14   Temp:    SpO2: 97%     CONSTITUTIONAL:  awake, alert, cooperative, no apparent distress  Neuro: Strength equal bilateral upper and lower extremities. Tongue midline.    NECK: left neck incision well approximated, soft, no hematoma or ecchymosis  LUNGS:  no increased work of breathing, good air exchange   CARDIOVASCULAR:  regular rate and rhythm, bilateral radial pulse 2+  ABDOMEN:  soft, non-distended and non-tender    LABS:    Lab Results   Component Value Date    WBC 5.5 11/04/2021    HGB 13.4 11/04/2021    HCT 39.9 11/04/2021     11/04/2021    PROTIME 10.1 11/03/2021    INR 0.9 11/03/2021    APTT 26.7 11/03/2021    K 3.8 11/04/2021    BUN 22 11/04/2021    CREATININE 1.0 11/04/2021       RADIOLOGY:

## 2021-11-05 NOTE — PROGRESS NOTES
Vascular Surgery Progress Note    Pt is being seen in f/u today regarding left CEA 11/4    Subjective  Pt s/e. Pain well controlled. Hungry this morning. Cardene off this am.  Moves all 4 extremities. No neurologic deficits. Current Medications:    sodium chloride      niCARdipine Stopped (11/04/21 2200)      heparin irrigation, bupivacaine (PF), sodium chloride flush, sodium chloride, oxyCODONE, acetaminophen, LORazepam, ondansetron **OR** ondansetron, labetalol    sodium chloride flush  10 mL IntraVENous 2 times per day    baclofen  10 mg Oral BID    metoprolol tartrate  12.5 mg Oral BID    gabapentin  300 mg Oral TID    hydroCHLOROthiazide  25 mg Oral Daily    lisinopril  40 mg Oral Daily    sertraline  50 mg Oral Daily    nicotine  1 patch TransDERmal Daily    atorvastatin  80 mg Oral Nightly    aspirin  81 mg Oral Daily        PHYSICAL EXAM:    BP (!) 155/63   Pulse 67   Temp 98.7 °F (37.1 °C) (Temporal)   Resp 13   Ht 5' 3\" (1.6 m)   Wt 170 lb (77.1 kg)   SpO2 95%   BMI 30.11 kg/m²     Intake/Output Summary (Last 24 hours) at 11/5/2021 0644  Last data filed at 11/5/2021 3220  Gross per 24 hour   Intake 3113.45 ml   Output 1040 ml   Net 2073.45 ml          Gen: awake, alert and oriented x3, no apparent distress  Neuro: 5-5 strength in all 4 extremities. Pupils equal round reactive. GCS 15.   CVS: RRR  Neck: Incision clean dry and intact  Resp: No increased work of breathing  Abd: Soft, non-tender, non-distended  R LE: Warm/soft  L LE: Warm/soft    LABS:    Lab Results   Component Value Date    WBC 5.5 11/04/2021    HGB 13.4 11/04/2021    HCT 39.9 11/04/2021     11/04/2021    PROTIME 10.1 11/03/2021    INR 0.9 11/03/2021    APTT 26.7 11/03/2021    K 3.8 11/04/2021    BUN 22 11/04/2021    CREATININE 1.0 11/04/2021       A/P  74 y.o. female with carotid artery stenosis who underwent a left carotid endarterectomy 11/4/2021    Diet  Cardene off  Home today      Juliane Dane, MD

## 2021-11-05 NOTE — PROGRESS NOTES
CVICU Progress Note    Name: Taylor Knapp  MRN: [id number]    CC: Postoperative Critical Care Management     Indication for Surgery/Procedure: Left carotid stenosis      Important/Relevant PMH/PSH: bilateral carotid stenosis, tobacco abuse, anxiety, back pain, depression, HLD, HTN, PVD, ulcer left great toe, GERD, thyroid disease      Procedure/Surgeries: 11/4/2021 Left carotid endarterectomy       Intake/Output Summary (Last 24 hours) at 11/5/2021 0817  Last data filed at 11/5/2021 0700  Gross per 24 hour   Intake 3113.45 ml   Output 1515 ml   Net 1598.45 ml       Recent Labs     11/02/21  1050 11/03/21  0558 11/04/21  0531   WBC 9.8 6.5 5.5   HGB 14.0 13.8 13.4   HCT 42.0 41.0 39.9    302 275      Lab Results   Component Value Date     11/04/2021    K 3.8 11/04/2021    K 3.9 06/19/2021    CL 98 11/04/2021    CO2 29 11/04/2021    BUN 22 11/04/2021    CREATININE 1.0 11/04/2021    GLUCOSE 99 11/04/2021    CALCIUM 9.4 11/04/2021         Physical Exam:    /64   Pulse 67   Temp 98.7 °F (37.1 °C) (Temporal)   Resp 17   Ht 5' 3\" (1.6 m)   Wt 170 lb (77.1 kg)   SpO2 95%   BMI 30.11 kg/m²       General: Awake, alert. Only complaint is slight headache   Eyes: PERRL, anicteric   Pulmonary: Diminished bibasilar. No wheezes, no accessory muscle use noted   Cardiovascular:  RRR, no heaves or thrills on palpation  Tele: SR  Neurologic/Psych: A&Ox3, FORMAN to command, equal in strength bilaterally, tongue midline   Skin: Warm and dry  Incisions: left neck incision soft well approximated      Assessment/Plan: POD #1  1.  S/p Left CEA   - s/e no acute issues overnight, only compliant is slight headache, no neurological deficits noted    - aspirin  - Remove art line and bowman   - Advance diet  - Ambulate       Dispo: Likely discharge home today    Electronically signed by YALE López CNP on 11/5/2021 at 8:17 AM

## 2021-11-05 NOTE — PROGRESS NOTES
Jennifer Torres Check NP patient up ambulating said she's a little wobbly and feet tingling (takes Neurontin), HA front had Tylenol.

## 2021-11-05 NOTE — PROGRESS NOTES
Discharge instructions given to patient and daughter with scripts. Daughter brought in clothing and shoes for patient.

## 2021-11-13 NOTE — DISCHARGE SUMMARY
Vascular Surgery Discharge Summary    [facility] SUMMARY:                The patient is a 74 y.o. female who was admitted to the hospital on 10/31/2021  3:28 PM for treatment of TIA from left carotid stenosis. On the day of admission, a left carotid endarterectomy was performed. The patient's hospital course was uncomplicated and consisted of physical therapy, incision observation, and a return to normal oral intake. The patient was discharged on 11/5/2021  1:15 PM tolerating a diet, moving bowels, and urinating without difficulty. The incisions were clean and intact. The patient was discharged to home in satisfactory condition with instructions to call the office for a follow up appointment. Condition on discharge:  [address] [facility] Problem List:  Principal Problem:    Symptomatic stenosis of left carotid artery  Active Problems:    Tobacco dependence    Bilateral carotid artery stenosis    PVD (peripheral vascular disease) with claudication ([facility])    Acute CVA (cerebrovascular accident) ([facility] [address]    HTN (hypertension)    Visual field defects  Resolved Problems:    * No resolved hospital problems.  *     Procedure(s) (LRB):  LEFT CAROTID ENDARTERECTOMY (Left)    Discharge Medications:   @DISCHMEDS(<NOROUTINE> error)@    Meghana Barajas MD  11/13/2021

## 2021-11-22 ENCOUNTER — TELEPHONE (OUTPATIENT)
Dept: VASCULAR SURGERY | Age: 66
End: 2021-11-22

## 2021-11-23 ENCOUNTER — OFFICE VISIT (OUTPATIENT)
Dept: VASCULAR SURGERY | Age: 66
End: 2021-11-23

## 2021-11-23 VITALS — BODY MASS INDEX: 30.12 KG/M2 | HEIGHT: 63 IN | WEIGHT: 170 LBS

## 2021-11-23 DIAGNOSIS — I65.23 BILATERAL CAROTID ARTERY STENOSIS: ICD-10-CM

## 2021-11-23 DIAGNOSIS — Z98.890 S/P CAROTID ENDARTERECTOMY: ICD-10-CM

## 2021-11-23 DIAGNOSIS — I10 ESSENTIAL HYPERTENSION: Primary | ICD-10-CM

## 2021-11-23 PROCEDURE — 99024 POSTOP FOLLOW-UP VISIT: CPT | Performed by: SURGERY

## 2021-11-23 RX ORDER — LISINOPRIL 20 MG/1
40 TABLET ORAL DAILY
Qty: 60 TABLET | Refills: 1 | Status: SHIPPED
Start: 2021-11-23 | End: 2022-01-25 | Stop reason: DRUGHIGH

## 2021-11-23 RX ORDER — HYDROCHLOROTHIAZIDE 25 MG/1
25 TABLET ORAL DAILY
Qty: 30 TABLET | Refills: 1 | Status: SHIPPED
Start: 2021-11-23 | End: 2022-01-25 | Stop reason: ALTCHOICE

## 2021-11-23 NOTE — PROGRESS NOTES
11/23/2021    Kilo Harden  1955    Chief Complaint   Patient presents with    Post-Op Check     s/p carotid having headaches, lightheadedness       Patient returns for post operative evaluation status post left carotid endarterectomy. She is accompanied by her daughter. The patient denies any unexpected problems since hospital discharge. She is reporting nausea, headaches and lightheadedness. Procedure Laterality Date    ABLATION OF DYSRHYTHMIC FOCUS      CAROTID ENDARTERECTOMY Left 11/4/2021    LEFT CAROTID ENDARTERECTOMY performed by Carlene Chris MD at [address] [place] [address]      right foot surgery 5 years ago    HYSTERECTOMY         Physical Exam:  The neck incision is healing without evidence of infection. Heart rhythm is regular. Radial pulse are appreciated bilaterally. Assessment:  Post-operative carotid endarterectomy. Problem List Items Addressed This Visit     Bilateral carotid artery stenosis    Relevant Orders    US CAROTID ARTERY BILATERAL    S/P carotid endarterectomy    Relevant Medications    lisinopril (PRINIVIL;ZESTRIL) 20 MG tablet    hydroCHLOROthiazide (HYDRODIURIL) 25 MG tablet    Other Relevant Orders    US CAROTID ARTERY BILATERAL      Other Visit Diagnoses     Essential hypertension    -  Primary    Relevant Medications    lisinopril (PRINIVIL;ZESTRIL) 20 MG tablet    hydroCHLOROthiazide (HYDRODIURIL) 25 MG tablet          I reviewed with the patient that normal activities can be resumed as tolerated. Plan: Return in 6 months for follow-up carotid ultrasound. Agree. Patient doing well. I did refill her prescriptions at her request as she is scheduled to see a new family physician.

## 2021-11-24 ENCOUNTER — TELEPHONE (OUTPATIENT)
Dept: VASCULAR SURGERY | Age: 66
End: 2021-11-24

## 2021-11-24 NOTE — TELEPHONE ENCOUNTER
Patient called, had severe left calf and foot pain while shopping after she left office. She states she was on Pletal in past but stopped taking it on her own awhile ago. She is asking if she should resume this and would need a prescription sent to [facility].

## 2022-01-25 ENCOUNTER — OFFICE VISIT (OUTPATIENT)
Dept: PRIMARY CARE CLINIC | Age: 67
End: 2022-01-25
Payer: MEDICARE

## 2022-01-25 ENCOUNTER — IMMUNIZATION (OUTPATIENT)
Dept: PRIMARY CARE CLINIC | Age: 67
End: 2022-01-25
Payer: MEDICARE

## 2022-01-25 VITALS
HEART RATE: 86 BPM | HEIGHT: 63 IN | OXYGEN SATURATION: 97 % | SYSTOLIC BLOOD PRESSURE: 118 MMHG | DIASTOLIC BLOOD PRESSURE: 66 MMHG | TEMPERATURE: 98.1 F | WEIGHT: 155 LBS | BODY MASS INDEX: 27.46 KG/M2

## 2022-01-25 DIAGNOSIS — I73.9 PAD (PERIPHERAL ARTERY DISEASE) (HCC): ICD-10-CM

## 2022-01-25 DIAGNOSIS — I73.9 PVD (PERIPHERAL VASCULAR DISEASE) WITH CLAUDICATION (HCC): ICD-10-CM

## 2022-01-25 DIAGNOSIS — M54.16 RADICULOPATHY, LUMBAR REGION: ICD-10-CM

## 2022-01-25 DIAGNOSIS — Z86.73 HISTORY OF CEREBROVASCULAR ACCIDENT (CVA) DUE TO ISCHEMIA: Primary | ICD-10-CM

## 2022-01-25 DIAGNOSIS — M48.061 SPINAL STENOSIS, LUMBAR REGION, WITHOUT NEUROGENIC CLAUDICATION: ICD-10-CM

## 2022-01-25 DIAGNOSIS — Z23 NEED FOR VACCINATION FOR PNEUMOCOCCUS: ICD-10-CM

## 2022-01-25 DIAGNOSIS — I65.23 BILATERAL CAROTID ARTERY STENOSIS: ICD-10-CM

## 2022-01-25 DIAGNOSIS — R94.31 ABNORMAL EKG: ICD-10-CM

## 2022-01-25 DIAGNOSIS — Z98.890 S/P CAROTID ENDARTERECTOMY: ICD-10-CM

## 2022-01-25 DIAGNOSIS — F41.1 GAD (GENERALIZED ANXIETY DISORDER): ICD-10-CM

## 2022-01-25 DIAGNOSIS — Z12.11 SCREENING FOR COLON CANCER: ICD-10-CM

## 2022-01-25 DIAGNOSIS — F17.200 TOBACCO DEPENDENCE: ICD-10-CM

## 2022-01-25 DIAGNOSIS — Z12.31 SCREENING MAMMOGRAM FOR BREAST CANCER: ICD-10-CM

## 2022-01-25 DIAGNOSIS — I10 ESSENTIAL HYPERTENSION: ICD-10-CM

## 2022-01-25 DIAGNOSIS — Z78.0 POSTMENOPAUSAL: ICD-10-CM

## 2022-01-25 DIAGNOSIS — R79.9 ABNORMAL FINDING OF BLOOD CHEMISTRY, UNSPECIFIED: ICD-10-CM

## 2022-01-25 LAB
ALBUMIN SERPL-MCNC: 4.4 G/DL (ref 3.5–5.2)
ALP BLD-CCNC: 282 U/L (ref 35–104)
ALT SERPL-CCNC: 111 U/L (ref 0–32)
ANION GAP SERPL CALCULATED.3IONS-SCNC: 13 MMOL/L (ref 7–16)
AST SERPL-CCNC: 66 U/L (ref 0–31)
BILIRUB SERPL-MCNC: 0.4 MG/DL (ref 0–1.2)
BUN BLDV-MCNC: 17 MG/DL (ref 6–23)
CALCIUM SERPL-MCNC: 10.4 MG/DL (ref 8.6–10.2)
CHLORIDE BLD-SCNC: 99 MMOL/L (ref 98–107)
CHOLESTEROL, TOTAL: 150 MG/DL (ref 0–199)
CO2: 26 MMOL/L (ref 22–29)
CREAT SERPL-MCNC: 1 MG/DL (ref 0.5–1)
GFR AFRICAN AMERICAN: >60
GFR NON-AFRICAN AMERICAN: 55 ML/MIN/1.73
GLUCOSE BLD-MCNC: 99 MG/DL (ref 74–99)
HCT VFR BLD CALC: 44.8 % (ref 34–48)
HDLC SERPL-MCNC: 53 MG/DL
HEMOGLOBIN: 14.1 G/DL (ref 11.5–15.5)
LDL CHOLESTEROL CALCULATED: 81 MG/DL (ref 0–99)
MCH RBC QN AUTO: 30.2 PG (ref 26–35)
MCHC RBC AUTO-ENTMCNC: 31.5 % (ref 32–34.5)
MCV RBC AUTO: 95.9 FL (ref 80–99.9)
PDW BLD-RTO: 13.5 FL (ref 11.5–15)
PLATELET # BLD: 396 E9/L (ref 130–450)
PMV BLD AUTO: 9.4 FL (ref 7–12)
POTASSIUM SERPL-SCNC: 4.8 MMOL/L (ref 3.5–5)
RBC # BLD: 4.67 E12/L (ref 3.5–5.5)
SODIUM BLD-SCNC: 138 MMOL/L (ref 132–146)
TOTAL PROTEIN: 7.5 G/DL (ref 6.4–8.3)
TRIGL SERPL-MCNC: 79 MG/DL (ref 0–149)
VLDLC SERPL CALC-MCNC: 16 MG/DL
WBC # BLD: 9.7 E9/L (ref 4.5–11.5)

## 2022-01-25 PROCEDURE — 0004A COVID-19, PFIZER PURPLE TOP, DILUTE FOR USE, 12+ YRS, 30MCG/0.3ML DOSE: CPT | Performed by: FAMILY MEDICINE

## 2022-01-25 PROCEDURE — G0009 ADMIN PNEUMOCOCCAL VACCINE: HCPCS | Performed by: FAMILY MEDICINE

## 2022-01-25 PROCEDURE — 91300 COVID-19, PFIZER PURPLE TOP, DILUTE FOR USE, 12+ YRS, 30MCG/0.3ML DOSE: CPT | Performed by: FAMILY MEDICINE

## 2022-01-25 PROCEDURE — 99204 OFFICE O/P NEW MOD 45 MIN: CPT | Performed by: FAMILY MEDICINE

## 2022-01-25 PROCEDURE — 90670 PCV13 VACCINE IM: CPT | Performed by: FAMILY MEDICINE

## 2022-01-25 RX ORDER — CILOSTAZOL 100 MG/1
100 TABLET ORAL 2 TIMES DAILY
Qty: 180 TABLET | Refills: 1 | Status: SHIPPED
Start: 2022-01-25 | End: 2022-10-06

## 2022-01-25 RX ORDER — LORAZEPAM 0.5 MG/1
1 TABLET ORAL EVERY 8 HOURS PRN
Qty: 60 TABLET | Refills: 1 | Status: SHIPPED
Start: 2022-01-25 | End: 2022-04-21

## 2022-01-25 RX ORDER — METOPROLOL SUCCINATE 25 MG/1
12.5 TABLET, EXTENDED RELEASE ORAL DAILY
Qty: 45 TABLET | Refills: 1 | Status: SHIPPED
Start: 2022-01-25 | End: 2022-09-07

## 2022-01-25 RX ORDER — ATORVASTATIN CALCIUM 80 MG/1
80 TABLET, FILM COATED ORAL NIGHTLY
Qty: 90 TABLET | Refills: 1 | Status: SHIPPED
Start: 2022-01-25 | End: 2022-01-26 | Stop reason: ALTCHOICE

## 2022-01-25 RX ORDER — BUPROPION HYDROCHLORIDE 150 MG/1
150 TABLET ORAL EVERY MORNING
Qty: 30 TABLET | Refills: 2 | Status: SHIPPED
Start: 2022-01-25 | End: 2022-05-17

## 2022-01-25 RX ORDER — LISINOPRIL 20 MG/1
20 TABLET ORAL DAILY
Qty: 90 TABLET | Refills: 1 | Status: SHIPPED
Start: 2022-01-25 | End: 2022-10-06

## 2022-01-25 RX ORDER — BUSPIRONE HYDROCHLORIDE 10 MG/1
10 TABLET ORAL 2 TIMES DAILY
Qty: 60 TABLET | Refills: 2 | Status: SHIPPED
Start: 2022-01-25 | End: 2022-05-17

## 2022-01-25 RX ORDER — LISINOPRIL 20 MG/1
20 TABLET ORAL DAILY
Qty: 90 TABLET | Refills: 1 | Status: SHIPPED
Start: 2022-01-25 | End: 2022-01-25 | Stop reason: SDUPTHER

## 2022-01-25 RX ORDER — GABAPENTIN 300 MG/1
300 CAPSULE ORAL 3 TIMES DAILY
Qty: 90 CAPSULE | Refills: 2 | Status: SHIPPED
Start: 2022-01-25 | End: 2022-06-14

## 2022-01-25 RX ORDER — ZOSTER VACCINE RECOMBINANT, ADJUVANTED 50 MCG/0.5
0.5 KIT INTRAMUSCULAR SEE ADMIN INSTRUCTIONS
Qty: 0.5 ML | Refills: 1 | Status: SHIPPED | OUTPATIENT
Start: 2022-01-25 | End: 2022-02-09

## 2022-01-25 ASSESSMENT — PATIENT HEALTH QUESTIONNAIRE - PHQ9
6. FEELING BAD ABOUT YOURSELF - OR THAT YOU ARE A FAILURE OR HAVE LET YOURSELF OR YOUR FAMILY DOWN: 2
7. TROUBLE CONCENTRATING ON THINGS, SUCH AS READING THE NEWSPAPER OR WATCHING TELEVISION: 2
4. FEELING TIRED OR HAVING LITTLE ENERGY: 3
SUM OF ALL RESPONSES TO PHQ QUESTIONS 1-9: 19
5. POOR APPETITE OR OVEREATING: 3
SUM OF ALL RESPONSES TO PHQ9 QUESTIONS 1 & 2: 4
SUM OF ALL RESPONSES TO PHQ QUESTIONS 1-9: 19
SUM OF ALL RESPONSES TO PHQ QUESTIONS 1-9: 19
8. MOVING OR SPEAKING SO SLOWLY THAT OTHER PEOPLE COULD HAVE NOTICED. OR THE OPPOSITE, BEING SO FIGETY OR RESTLESS THAT YOU HAVE BEEN MOVING AROUND A LOT MORE THAN USUAL: 2
9. THOUGHTS THAT YOU WOULD BE BETTER OFF DEAD, OR OF HURTING YOURSELF: 0
1. LITTLE INTEREST OR PLEASURE IN DOING THINGS: 3
10. IF YOU CHECKED OFF ANY PROBLEMS, HOW DIFFICULT HAVE THESE PROBLEMS MADE IT FOR YOU TO DO YOUR WORK, TAKE CARE OF THINGS AT HOME, OR GET ALONG WITH OTHER PEOPLE: 1
SUM OF ALL RESPONSES TO PHQ QUESTIONS 1-9: 19
2. FEELING DOWN, DEPRESSED OR HOPELESS: 1
3. TROUBLE FALLING OR STAYING ASLEEP: 3

## 2022-01-25 ASSESSMENT — ENCOUNTER SYMPTOMS
CHEST TIGHTNESS: 0
COUGH: 0
ABDOMINAL PAIN: 0
WHEEZING: 0
NAUSEA: 0
BACK PAIN: 1
VOMITING: 0
SHORTNESS OF BREATH: 0
EYE REDNESS: 0
COLOR CHANGE: 0
BLOOD IN STOOL: 0
CONSTIPATION: 0
DIARRHEA: 0

## 2022-01-25 NOTE — PROGRESS NOTES
[address] [facility]   1955    Chief Complaint:     Chief Complaint   Patient presents with   [address]     HPI   Transfer from Dr Melinda Bueno  S/p L occipital CVA in November, bilateral carotid stenosis, s/p L CEA in November. Taking Lipitor 80mg daily and ASA 81mg daily. HTN controlled on lisinopril 20mg daily. Decreased from 40mg and d/c HCTZ due to hypotension, dizziness and presyncope. Doing well now. Needs follow up labs including lipid panel. Cardiology consult consider Freda Crass OP due to high risk for CAD including PVD. Notes continue current cardiac meds per IP (lisinopril, ASA, BB). Previous hx afib s/p ablation in . Follows w Dr Maria Brito for Vascular. Previously rx Pletal which she stopped taking on her own, but will need to restart due to claudication, combined with ASA and her statin. Reviewed smoking cessation and still pre-contemplative. HTN controlled, taking meds as rx. Denies symptoms high bp including HA, vision changes, CP, SOB, edema, focal neuro deficits. No symptoms low bp. Gabapentin for radicular pain from lumbar stenosis. Needs refill. Has followed previously with NSG. Was only on it for couple weeks, unsure whether it helped or not. Prediabetic based on most recent A1c in hospital.   Zoloft for mood and anxiety. Anxiety is significant and has \"borderline panic attacks. \" Zoloft is not helping her symptoms. Wellbutrin would be good for her tobacco dependence as well, agreeable to try. Since restarting the gabapentin and buspar (which did help her before), we can start with just these and follow up in 4-5 weeks to see how she is doing. Consider adding new SSRI at that point. HM: Does not get flu vaccines. COVID booster due today. Needs [id number]. No recent mammo. No recent Pap, s/p hysterectomy, continued Paps after and has never had an abnormal, OK to d/c these. Never had DEXA. Cscope 4-5 years ago, needs repeat due to high risk polyps, she's late on this.      Past Physical Activity:     Days of Exercise per Week: Not on file    Minutes of Exercise per Session: Not on file   Stress:     Feeling of Stress : Not on file   Social Connections:     Frequency of Communication with Friends and Family: Not on file    Frequency of Social Gatherings with Friends and Family: Not on file    Attends Mosque Services: Not on file    Active Member of [address] [place] [address] or Organizations: Not on file    Attends Club or Organization Meetings: Not on file    Marital Status: Not on file   Intimate Partner Violence:     Fear of Current or Ex-Partner: Not on file    Emotionally Abused: Not on file    Physically Abused: Not on file    Sexually Abused: Not on file   Housing Stability:     Unable to Pay for Housing in the Last Year: Not on file    Number of [place] in the Last Year: Not on file    Unstable Housing in the Last Year: Not on file       Family History   Problem Relation Age of Onset    Cancer Mother         breast    Stroke Father              Current Outpatient Medications   Medication Sig Dispense Refill    metoprolol succinate (TOPROL XL) 25 MG extended release tablet Take 0.5 tablets by mouth daily 45 tablet 1    cilostazol (PLETAL) 100 MG tablet Take 1 tablet by mouth 2 times daily 180 tablet 1    gabapentin (NEURONTIN) 300 MG capsule Take 1 capsule by mouth 3 times daily for 30 days.  90 capsule 2    atorvastatin (LIPITOR) 80 MG tablet Take 1 tablet by mouth nightly 90 tablet 1    lisinopril (PRINIVIL;ZESTRIL) 20 MG tablet Take 1 tablet by mouth daily 90 tablet 1    zoster recombinant adjuvanted vaccine (SHINGRIX) 50 MCG/0.5ML SUSR injection Inject 0.5 mLs into the muscle See Admin Instructions 1 dose now and repeat in 2-6 months 0.5 mL 1    busPIRone (BUSPAR) 10 MG tablet Take 1 tablet by mouth 2 times daily 60 tablet 2    buPROPion (WELLBUTRIN XL) 150 MG extended release tablet Take 1 tablet by mouth every morning 30 tablet 2    LORazepam (ATIVAN) 0.5 MG tablet Take 2 tablets by mouth every 8 hours as needed for Anxiety for up to 30 days. 60 tablet 1    aspirin 81 MG EC tablet Take 81 mg by mouth daily      albuterol (PROAIR HFA) 108 (90 BASE) MCG/ACT inhaler Inhale 2 puffs into the lungs every 6 hours as needed for Wheezing for up to 7 days. 1 Inhaler 0     No current facility-administered medications for this visit. Allergies   Allergen Reactions    Codeine      I take vicodin, I had a reaction along time ago    Demerol        REVIEW OF SYSTEMS  Review of Systems   Constitutional: Negative for activity change, appetite change, chills, diaphoresis, fatigue, fever and unexpected weight change. HENT: Negative. Eyes: Negative for redness and visual disturbance. Respiratory: Negative for cough, chest tightness, shortness of breath and wheezing. Cardiovascular: Negative for chest pain, palpitations and leg swelling. Claudication   Gastrointestinal: Negative for abdominal pain, blood in stool, constipation, diarrhea, nausea and vomiting. Endocrine: Negative for cold intolerance, heat intolerance, polydipsia, polyphagia and polyuria. Musculoskeletal: Positive for arthralgias and back pain. Negative for myalgias. Skin: Negative for color change, pallor and rash. Neurological: Negative for dizziness, syncope, weakness, light-headedness, numbness and headaches. Psychiatric/Behavioral: Positive for dysphoric mood. Negative for confusion, decreased concentration, hallucinations, self-injury, sleep disturbance and suicidal ideas. The patient is nervous/anxious. The patient is not hyperactive. All other systems reviewed and are negative. PHYSICAL EXAM  /66   Pulse 86   Temp 98.1 °F (36.7 °C) (Temporal)   Ht 5' 3\" (1.6 m)   Wt 155 lb (70.3 kg)   SpO2 97%   BMI 27.46 kg/m²   Physical Exam  Vitals reviewed. Constitutional:       General: She is not in acute distress. Appearance: Normal appearance. She is well-developed. She is not ill-appearing or diaphoretic. Neck:      Thyroid: No thyromegaly. Vascular: No JVD. Cardiovascular:      Rate and Rhythm: Normal rate and regular rhythm. Heart sounds: Normal heart sounds. No murmur heard. No friction rub. No gallop. Pulmonary:      Effort: Pulmonary effort is normal. No respiratory distress. Breath sounds: Normal breath sounds. No stridor. No wheezing, rhonchi or rales. Musculoskeletal:      Cervical back: Neck supple. Right lower leg: No edema. Left lower leg: No edema. Lymphadenopathy:      Cervical: No cervical adenopathy. Skin:     General: Skin is warm and dry. Findings: No rash. Neurological:      General: No focal deficit present. Mental Status: She is alert and oriented to person, place, and time. Mental status is at baseline. Psychiatric:         Attention and Perception: Attention and perception normal.         Mood and Affect: Mood is anxious and depressed. Affect is not inappropriate. Speech: Speech normal.         Behavior: Behavior normal. Behavior is cooperative. Thought Content: Thought content normal.         Judgment: Judgment normal.           ASSESSMENT/PLAN:     Diagnosis Orders   1. History of cerebrovascular accident (CVA) due to ischemia     2. S/P carotid endarterectomy  lisinopril (PRINIVIL;ZESTRIL) 20 MG tablet    DISCONTINUED: lisinopril (PRINIVIL;ZESTRIL) 20 MG tablet   3. Essential hypertension  lisinopril (PRINIVIL;ZESTRIL) 20 MG tablet    DISCONTINUED: lisinopril (PRINIVIL;ZESTRIL) 20 MG tablet   4. Screening mammogram for breast cancer  SHARMIN AKIRA DIGITAL SCREEN BILATERAL PER PROTOCOL   5. PAD (peripheral artery disease) (HCC)  CBC    Comprehensive Metabolic Panel    Lipid Panel    NM Cardiac Stress Test Nuclear Imaging    Cardiac Stress Test - w/Pharm   6. THOMAS (generalized anxiety disorder)  LORazepam (ATIVAN) 0.5 MG tablet   7.  Screening for colon cancer  External Referral To General Surgery   8. Abnormal finding of blood chemistry, unspecified   CBC    Lipid Panel   9. Bilateral carotid artery stenosis     10. Radiculopathy, lumbar region     11. Spinal stenosis, lumbar region, without neurogenic claudication     12. Tobacco dependence     13. Need for vaccination for pneumococcus  Pneumococcal conjugate vaccine 13-valent   14. Postmenopausal  DEXA BONE DENSITY AXIAL SKELETON   15. Abnormal EKG  NM Cardiac Stress Test Nuclear Imaging    Cardiac Stress Test - w/Pharm   16. PVD (peripheral vascular disease) with claudication ([address]       See orders and HPI. OARRS OK. Reviewed age and gender appropriate health screening exams and vaccinations. Advised patient regardingimportance of keeping up with recommended health maintenance and to schedule as soon as possible if overdue, as this is important in assessing for undiagnosed pathology, especially cancer. Patient verbalizes understandingand agrees. Call or go to ED immediately if symptoms worsen or persist.  Return 4-6 week. Sooner if necessary. Counseled regarding above diagnosis, including possible risks and complications,  especially if leftuncontrolled. Counseled regarding the possible side effects, risks, benefits and alternatives to treatment; patient and/or guardian verbalizes understanding. Advised patient to call with any new medication issues. All questions answered.     Future Appointments   Date Time Provider Isael Adame   1/27/2022  1:40 PM YAEL Harrison - CNP [facility]   5/24/2022  2:00 PM Sharie Castleman, MD [facility]/MEGA Quintero MD  1/25/2022

## 2022-01-26 ENCOUNTER — TELEPHONE (OUTPATIENT)
Dept: PRIMARY CARE CLINIC | Age: 67
End: 2022-01-26

## 2022-01-26 RX ORDER — ROSUVASTATIN CALCIUM 40 MG/1
40 TABLET, COATED ORAL EVERY EVENING
Qty: 30 TABLET | Refills: 2 | Status: SHIPPED
Start: 2022-01-26 | End: 2022-02-09

## 2022-01-26 NOTE — TELEPHONE ENCOUNTER
Cholesterol levels are improved, but the atorvastatin has caused 2-3x increase in her liver enzymes. Would like to trial Crestor and see if tolerates better. Sent to Pharm.

## 2022-02-01 ENCOUNTER — TELEPHONE (OUTPATIENT)
Dept: CARDIOLOGY | Age: 67
End: 2022-02-01

## 2022-02-09 ENCOUNTER — OFFICE VISIT (OUTPATIENT)
Dept: NEUROLOGY | Age: 67
End: 2022-02-09
Payer: MEDICARE

## 2022-02-09 VITALS
OXYGEN SATURATION: 96 % | DIASTOLIC BLOOD PRESSURE: 85 MMHG | TEMPERATURE: 97.3 F | HEART RATE: 75 BPM | SYSTOLIC BLOOD PRESSURE: 154 MMHG

## 2022-02-09 DIAGNOSIS — Z86.73 HISTORY OF CEREBROVASCULAR ACCIDENT (CVA) DUE TO ISCHEMIA: Primary | ICD-10-CM

## 2022-02-09 DIAGNOSIS — R51.9 INTRACTABLE EPISODIC HEADACHE, UNSPECIFIED HEADACHE TYPE: ICD-10-CM

## 2022-02-09 PROCEDURE — 99214 OFFICE O/P EST MOD 30 MIN: CPT | Performed by: NURSE PRACTITIONER

## 2022-02-09 RX ORDER — ATORVASTATIN CALCIUM 80 MG/1
80 TABLET, FILM COATED ORAL DAILY
COMMUNITY
End: 2022-03-21 | Stop reason: ALTCHOICE

## 2022-02-09 RX ORDER — RIMEGEPANT SULFATE 75 MG/75MG
75 TABLET, ORALLY DISINTEGRATING ORAL PRN
Qty: 4 TABLET | Refills: 0 | COMMUNITY
Start: 2022-02-09

## 2022-02-09 NOTE — PROGRESS NOTES
[address] [facility]. Satya Morris M.D., F.A.C.P. Libia James, DNP, [id number]  Dallascarlos Krishna Jose Maria Lopez, MSN, APRN, FNP-C  Finn Kennedy, MSN, APRN, FNP-C  Lillian Mckeon, MSN, APRN, FNP-C   Narcisa Chavarria PA-C  [address] [place] [address]  [place], [address]  Phone: [phone]  Fax: [phone]       Verito Don is a 74 y.o. right handed female     PMH of Anxiety, Back pain, bilateral carotid artery stenosis, chronic back pain, depression, GERD, Headache, HLD, HTN, PVD, Thyroid disease, ulcer of great toe ( left)     Presented to ED in November 2021 with AMS which had been intermittent. She was confused and c/o of headache with intermittent blurred vision. CTH negative for acute findings. She was discharged and came back to ED due to HTN, un coordination and short term memory loss. CTH repeated and showed small acute left parietal infarct. DAPT was started. CUS showed stenosis bilaterally. MRI brain showed left occipital stroke extending up to left parietal lobe. She follows with Dr. Kena Calvillo OP vascular. Patient had left CEA completed during her admission. Today patient notes that she has been having some lightheaded spells. She does note some episodes of palpitations along with the lightheadedness. She does not see a cardiologist.  She is currently on aspirin and statin. She has complaints of occipital type headaches occurring maybe once or twice a month. Headaches can last up to 2 days and are debilitating.     No coughing or wheezing    No itching or bruising appreciated  No numbness, tingling or focal arm/leg weakness    ROS otherwise negative      Objective:     BP (!) 154/85 (Site: Right Upper Arm)   Pulse 75   Temp 97.3 °F (36.3 °C)   SpO2 96%     General appearance: alert, appears stated age, cooperative and no distress  Head: normocephalic, without obvious abnormality, atraumatic  Eyes: conjunctivae/corneas clear  Neck: symmetrical, trachea midline   Lungs: clear to auscultation bilaterally  Extremities:  normal, atraumatic, no cyanosis or edema  Skin: color, texture, turgor normal---no rashes or lesions      Mental Status: Alert to self, time and place. Follows commands.     Appropriate attention/concentration  Intact fundus of knowledge      Speech: no dysarthria  Language: no aphasias     Cranial Nerves:  I: smell NA   II: visual acuity  NA   II: visual fields Right HH   II: pupils AUDRA   III,VII: ptosis None   III,IV,VI: extraocular muscles  Full ROM   V: mastication Normal   V: facial light touch sensation  Normal   V,VII: corneal reflex     VII: facial muscle function - upper  Normal   VII: facial muscle function - lower Normal   VIII: hearing Normal   IX: soft palate elevation  Normal   IX,X: gag reflex    XI: trapezius strength  5/5   XI: sternocleidomastoid strength 5/5   XI: neck extension strength  5/5   XII: tongue strength  Normal     Motor:  5/5 throughout  Normal bulk and tone  No drift   No abnormal movements    Sensory:  LT normal    Coordination:   FN, FFM and SCOTTY normal    Reflexes:  BE throughout     Laboratory/Radiology:     CBC with Differential:    Lab Results   Component Value Date    WBC 9.7 01/25/2022    RBC 4.67 01/25/2022    HGB 14.1 01/25/2022    HCT 44.8 01/25/2022     01/25/2022    MCV 95.9 01/25/2022    MCH 30.2 01/25/2022    MCHC 31.5 01/25/2022    RDW 13.5 01/25/2022    LYMPHOPCT 28.1 11/04/2021    MONOPCT 9.3 11/04/2021    BASOPCT 0.9 11/04/2021    MONOSABS 0.51 11/04/2021    LYMPHSABS 1.54 11/04/2021    EOSABS 0.24 11/04/2021    BASOSABS 0.05 11/04/2021     CMP:    Lab Results   Component Value Date     01/25/2022    K 4.8 01/25/2022    K 3.9 06/19/2021    CL 99 01/25/2022    CO2 26 01/25/2022    BUN 17 01/25/2022    CREATININE 1.0 01/25/2022    GFRAA >60 01/25/2022    LABGLOM 55 01/25/2022    GLUCOSE 99 01/25/2022    PROT 7.5 01/25/2022    LABALBU 4.4 01/25/2022    CALCIUM 10.4 01/25/2022    BILITOT 0.4 01/25/2022    ALKPHOS 282 01/25/2022    AST 66 01/25/2022     01/25/2022     Hepatic Function Panel:    Lab Results   Component Value Date    ALKPHOS 282 01/25/2022     01/25/2022    AST 66 01/25/2022    PROT 7.5 01/25/2022    BILITOT 0.4 01/25/2022    BILIDIR <0.2 10/10/2014    IBILI 0.1 10/10/2014    LABALBU 4.4 01/25/2022     HgBA1c:    Lab Results   Component Value Date    LABA1C 5.8 11/03/2021     FLP:    Lab Results   Component Value Date    TRIG 79 01/25/2022    HDL 53 01/25/2022    LDLCALC 81 01/25/2022    LABVLDL 16 01/25/2022     CTH negative for acute findings. CTH repeated and showed small acute left parietal infarct. CUS showed stenosis bilaterally. MRI brain showed left occipital stroke extending up to left parietal lobe. Echo: Summary   Technically sub-optimal images. No intra cardiac mass or thrombus. Agitated saline injection showed no right to left shunt. Normal left ventricular chamber size. Normal left ventricular systolic function, LVEF 09%. Mild asymmetric septal hypertrophy. No LVOT obstruction. Stage I diastolic dysfunction. Interatrial septum not well visualized but appears intact. Normal right ventricle size and function. No mitral valve prolapse. There is trace aortic regurgitation. Normal aortic root size. No evidence of pericardial effusion. Compared to prior echo from 5/11/2016, no changes noted.     All labs and imaging studies reviewed independently today     Assessment:     Left occipital infarct likely due to left carotid stenosis  --- bilateral ICA's with significant stenosis--- had left CEA  ---  and A1c 5.3  --- stroke risk factors include:  HTN, HLD, current everyday smoker   --- right HH     Episodic headache  --- We will try her on Nurtec as triptan is contraindicated in patients with cardiac history    Plan:     Continue aspirin and statin    Samples of Nurtec 75 mg as needed given    Patient is to call in regards to referral to cardiology as she believes her PCP has ordered some cardiac work-up    Continue to follow with vascular    Follow-up in 6 months    Call with any questions or concerns      YAEL Smith - CNP  9:07 AM  2/9/2022

## 2022-02-10 ENCOUNTER — TELEPHONE (OUTPATIENT)
Dept: PRIMARY CARE CLINIC | Age: 67
End: 2022-02-10

## 2022-02-22 ENCOUNTER — HOSPITAL ENCOUNTER (OUTPATIENT)
Dept: GENERAL RADIOLOGY | Age: 67
Discharge: HOME | End: 2022-02-24
Payer: MEDICARE

## 2022-02-22 VITALS — BODY MASS INDEX: 28.35 KG/M2 | WEIGHT: 160 LBS | HEIGHT: 63 IN

## 2022-02-22 DIAGNOSIS — Z78.0 POSTMENOPAUSAL: ICD-10-CM

## 2022-02-22 DIAGNOSIS — Z12.31 SCREENING MAMMOGRAM FOR BREAST CANCER: ICD-10-CM

## 2022-02-22 PROCEDURE — 77080 DXA BONE DENSITY AXIAL: CPT

## 2022-02-22 PROCEDURE — 77063 BREAST TOMOSYNTHESIS BI: CPT

## 2022-02-23 NOTE — RESULT ENCOUNTER NOTE
Mild osteopenia. Adequate dietary calcium intake and begin Vit D supplement 5370-3123 units per day. Routine exercise as well to prevent it from worsening.

## 2022-03-03 NOTE — RESULT ENCOUNTER NOTE
OK, she told Cardiology she did not switch and they removed it from her list. Let's recheck CMP and lipid in 2 weeks

## 2022-03-17 ENCOUNTER — NURSE ONLY (OUTPATIENT)
Dept: PRIMARY CARE CLINIC | Age: 67
End: 2022-03-17

## 2022-03-17 DIAGNOSIS — I10 ESSENTIAL HYPERTENSION: ICD-10-CM

## 2022-03-17 DIAGNOSIS — I10 ESSENTIAL HYPERTENSION: Primary | ICD-10-CM

## 2022-03-17 LAB
ALBUMIN SERPL-MCNC: 4.1 G/DL (ref 3.5–5.2)
ALP BLD-CCNC: 93 U/L (ref 35–104)
ALT SERPL-CCNC: 39 U/L (ref 0–32)
ANION GAP SERPL CALCULATED.3IONS-SCNC: 10 MMOL/L (ref 7–16)
AST SERPL-CCNC: 36 U/L (ref 0–31)
BILIRUB SERPL-MCNC: <0.2 MG/DL (ref 0–1.2)
BUN BLDV-MCNC: 12 MG/DL (ref 6–23)
CALCIUM SERPL-MCNC: 9.7 MG/DL (ref 8.6–10.2)
CHLORIDE BLD-SCNC: 101 MMOL/L (ref 98–107)
CHOLESTEROL, TOTAL: 144 MG/DL (ref 0–199)
CO2: 27 MMOL/L (ref 22–29)
CREAT SERPL-MCNC: 1 MG/DL (ref 0.5–1)
GFR AFRICAN AMERICAN: >60
GFR NON-AFRICAN AMERICAN: 55 ML/MIN/1.73
GLUCOSE BLD-MCNC: 99 MG/DL (ref 74–99)
HDLC SERPL-MCNC: 60 MG/DL
LDL CHOLESTEROL CALCULATED: 71 MG/DL (ref 0–99)
POTASSIUM SERPL-SCNC: 5.2 MMOL/L (ref 3.5–5)
SODIUM BLD-SCNC: 138 MMOL/L (ref 132–146)
TOTAL PROTEIN: 7 G/DL (ref 6.4–8.3)
TRIGL SERPL-MCNC: 63 MG/DL (ref 0–149)
VLDLC SERPL CALC-MCNC: 13 MG/DL

## 2022-03-21 RX ORDER — ROSUVASTATIN CALCIUM 40 MG/1
40 TABLET, COATED ORAL EVERY EVENING
Qty: 90 TABLET | Refills: 2 | Status: SHIPPED | OUTPATIENT
Start: 2022-03-21

## 2022-04-11 ENCOUNTER — TELEPHONE (OUTPATIENT)
Dept: VASCULAR SURGERY | Age: 67
End: 2022-04-11

## 2022-04-14 ENCOUNTER — TELEPHONE (OUTPATIENT)
Dept: VASCULAR SURGERY | Age: 67
End: 2022-04-14

## 2022-04-14 NOTE — TELEPHONE ENCOUNTER
Left message to call to schedule carotid testing that is due prior to appt on 5- with Dr Bharath Bhardwaj. Second attempt.

## 2022-04-20 ENCOUNTER — TELEPHONE (OUTPATIENT)
Dept: VASCULAR SURGERY | Age: 67
End: 2022-04-20

## 2022-04-20 DIAGNOSIS — I73.9 PVD (PERIPHERAL VASCULAR DISEASE) WITH CLAUDICATION (HCC): Primary | ICD-10-CM

## 2022-04-20 DIAGNOSIS — F41.1 GAD (GENERALIZED ANXIETY DISORDER): ICD-10-CM

## 2022-04-20 NOTE — TELEPHONE ENCOUNTER
Spoke to pt on her carotid testing that is due prior to appt 5- with Dr Quoc Snider. Pt having issues with legs and seen Pablo Orourke in past PVD. We will put order in for her JOJO testing to be done at [facility] and pt will call to set up both tests. She will call to move her appt with Dr Quoc Snider as needed after her testing is scheduled.

## 2022-04-21 RX ORDER — LORAZEPAM 0.5 MG/1
TABLET ORAL
Qty: 60 TABLET | Refills: 1 | Status: SHIPPED | OUTPATIENT
Start: 2022-04-21 | End: 2022-05-21

## 2022-05-17 RX ORDER — BUPROPION HYDROCHLORIDE 150 MG/1
TABLET ORAL
Qty: 30 TABLET | Refills: 5 | Status: SHIPPED | OUTPATIENT
Start: 2022-05-17

## 2022-05-17 RX ORDER — BUSPIRONE HYDROCHLORIDE 10 MG/1
TABLET ORAL
Qty: 60 TABLET | Refills: 5 | Status: SHIPPED | OUTPATIENT
Start: 2022-05-17

## 2022-06-02 ENCOUNTER — HOSPITAL ENCOUNTER (OUTPATIENT)
Dept: INTERVENTIONAL RADIOLOGY/VASCULAR | Age: 67
Discharge: HOME | End: 2022-06-04
Payer: MEDICARE

## 2022-06-02 DIAGNOSIS — I73.9 PVD (PERIPHERAL VASCULAR DISEASE) WITH CLAUDICATION (HCC): ICD-10-CM

## 2022-06-02 PROCEDURE — 93922 UPR/L XTREMITY ART 2 LEVELS: CPT

## 2022-06-06 ENCOUNTER — TELEPHONE (OUTPATIENT)
Dept: VASCULAR SURGERY | Age: 67
End: 2022-06-06

## 2022-06-07 ENCOUNTER — OFFICE VISIT (OUTPATIENT)
Dept: VASCULAR SURGERY | Age: 67
End: 2022-06-07
Payer: MEDICARE

## 2022-06-07 ENCOUNTER — HOSPITAL ENCOUNTER (OUTPATIENT)
Dept: CARDIOLOGY | Age: 67
Discharge: HOME | End: 2022-06-07
Payer: MEDICARE

## 2022-06-07 VITALS — BODY MASS INDEX: 30.12 KG/M2 | WEIGHT: 170 LBS | HEIGHT: 63 IN

## 2022-06-07 DIAGNOSIS — Z98.890 S/P CAROTID ENDARTERECTOMY: ICD-10-CM

## 2022-06-07 DIAGNOSIS — I65.23 BILATERAL CAROTID ARTERY STENOSIS: ICD-10-CM

## 2022-06-07 DIAGNOSIS — I70.213 ATHEROSCLEROSIS OF NATIVE ARTERY OF BOTH LOWER EXTREMITIES WITH INTERMITTENT CLAUDICATION (HCC): Primary | ICD-10-CM

## 2022-06-07 DIAGNOSIS — I65.23 CAROTID STENOSIS, ASYMPTOMATIC, BILATERAL: ICD-10-CM

## 2022-06-07 PROCEDURE — 93880 EXTRACRANIAL BILAT STUDY: CPT | Performed by: SURGERY

## 2022-06-07 PROCEDURE — 99213 OFFICE O/P EST LOW 20 MIN: CPT | Performed by: SURGERY

## 2022-06-07 PROCEDURE — 1123F ACP DISCUSS/DSCN MKR DOCD: CPT | Performed by: SURGERY

## 2022-06-07 PROCEDURE — 93880 EXTRACRANIAL BILAT STUDY: CPT

## 2022-06-07 NOTE — PROGRESS NOTES
Vascular Surgery Outpatient Progress Note      Chief Complaint   Patient presents with    Circulatory Problem     caroted stenosis asymptomatic bilateral       HISTORY OF PRESENT ILLNESS:                The patient is a 69 y.o. female who returns for follow-up evaluation of carotid artery disease. She denies any symptoms of stroke, ministroke, or amaurosis fugax. She denies any new problems with her legs other than some occasional numbness in her toes. She denies rest pain, claudication, or ulcers. Past Medical History:        Diagnosis Date    Anxiety     Back pain     Bilateral carotid artery stenosis 3/12/2021    Chronic back pain     Decreased dorsalis pedis pulse 2/25/2021    Depression     Discoloration of skin of toe 2/25/2021    GERD (gastroesophageal reflux disease)     Headache(784.0)     Hyperlipidemia     Hypertension     Left carotid stenosis 2/25/2021    PVD (peripheral vascular disease) with claudication ([address] 6/3/2021    Thyroid disease     Tobacco dependence 2/25/2021    Toe cyanosis 2/25/2021    Ulcer of great toe, left, limited to breakdown of skin ([address] 2/25/2021    Visual field defects 11/2/2021     Past Surgical History:        Procedure Laterality Date    ABLATION OF DYSRHYTHMIC FOCUS      CAROTID ENDARTERECTOMY Left 11/4/2021    LEFT CAROTID ENDARTERECTOMY performed by Zully Qiu MD at [address]      CHOLECYSTECTOMY      FOOT SURGERY      right foot surgery 5 years ago    HYSTERECTOMY      age 25     Current Medications:   Prior to Admission medications    Medication Sig Start Date End Date Taking?  Authorizing Provider   busPIRone (BUSPAR) 10 MG tablet TAKE ONE TABLET BY MOUTH TWO TIMES A DAY 5/17/22  Yes Yohannes Toledo MD   buPROPion (WELLBUTRIN XL) 150 MG extended release tablet TAKE ONE TABLET BY MOUTH EVERY DAY IN THE MORNING 5/17/22  Yes Yohannes Toledo MD   rosuvastatin (CRESTOR) 40 MG tablet Take 1 tablet by mouth every evening 3/21/22  Yes Adalgisa Rangel MD   Rimegepant Sulfate (NURTEC) 75 MG TBDP Take 75 mg by mouth as needed (severe migraine) 2/9/22  Yes YAEL Olivares CNP   metoprolol succinate (TOPROL XL) 25 MG extended release tablet Take 0.5 tablets by mouth daily 1/25/22  Yes Adalgisa Rangel MD   lisinopril (PRINIVIL;ZESTRIL) 20 MG tablet Take 1 tablet by mouth daily 1/25/22  Yes Adalgisa Rangel MD   aspirin 81 MG EC tablet Take 81 mg by mouth daily   Yes Historical Provider, MD   cilostazol (PLETAL) 100 MG tablet Take 1 tablet by mouth 2 times daily 1/25/22 2/24/22  Adalgisa Rangel MD   gabapentin (NEURONTIN) 300 MG capsule Take 1 capsule by mouth 3 times daily for 30 days. 1/25/22 2/24/22  Adalgisa Rangel MD     Allergies:  Codeine and Demerol    Social History     Socioeconomic History    Marital status:      Spouse name: Not on file    Number of children: Not on file    Years of education: Not on file    Highest education level: Not on file   Occupational History    Not on file   Tobacco Use    Smoking status: Current Every Day Smoker     Packs/day: 0.50     Types: Cigarettes    Smokeless tobacco: Never Used   Vaping Use    Vaping Use: Never used   Substance and Sexual Activity    Alcohol use: Yes     Comment: social. rare    Drug use: No    Sexual activity: Not on file   Other Topics Concern    Not on file   Social History Narrative    Not on file     Social Determinants of Health     Financial Resource Strain:     Difficulty of Paying Living Expenses: Not on file   Food Insecurity:     Worried About Running Out of Food in the Last Year: Not on file    Sander of Food in the Last Year: Not on file   Transportation Needs:     Lack of Transportation (Medical): Not on file    Lack of Transportation (Non-Medical):  Not on file   Physical Activity:     Days of Exercise per Week: Not on file    Minutes of Exercise per Session: Not on file   Stress:     Feeling of Stress : Not on file Social Connections:     Frequency of Communication with Friends and Family: Not on file    Frequency of Social Gatherings with Friends and Family: Not on file    Attends Sikhism Services: Not on file    Active Member of Clubs or Organizations: Not on file    Attends Club or Organization Meetings: Not on file    Marital Status: Not on file   Intimate Partner Violence:     Fear of Current or Ex-Partner: Not on file    Emotionally Abused: Not on file    Physically Abused: Not on file    Sexually Abused: Not on file   Housing Stability:     Unable to Pay for Housing in the Last Year: Not on file    Number of [place] in the Last Year: Not on file    Unstable Housing in the Last Year: Not on file        Family History   Problem Relation Age of Onset    Breast Cancer Mother     Stroke Father        REVIEW OF SYSTEMS (New symptoms):    Eyes:      Blurred vision:  No [x]/Yes []               Diplopia:   No [x]/Yes []               Vision loss:       No [x]/Yes []   Ears, nose, throat:             Hearing loss:    No [x]/Yes []      Vertigo:   No [x]/Yes []                       Swallowing problem:  No [x]/Yes []               Nose bleeds:   No [x]/Yes []      Voice hoarseness:  No [x]/Yes []  Respiratory:             Cough:   No [x]/Yes []      Pleuritic chest pain:  No [x]/Yes []                        Dyspnea:   No [x]/Yes []      Wheezing:   No [x]/Yes []  Cardiovascular:             Angina:   No [x]/Yes []      Palpitations:   No [x]/Yes []          Claudication:    No [x]/Yes []      Leg swelling:   No [x]/Yes []  Gastrointestinal:             Nausea or vomiting:  No [x]/Yes []               Abdominal pain:  No [x]/Yes []                     Intestinal bleeding: No [x]/Yes []  Musculoskeletal:             Leg pain:   No [x]/Yes []      Back pain:   No [x]/Yes []                    Weakness:   No [x]/Yes []  Neurologic:             Numbness:   No [x]/Yes []      Paralysis:   No [x]/Yes [] Headaches:   No [x]/Yes []  Hematologic, lymphatic:   Anemia:   No [x]/Yes []              Bleeding or bruising:  No [x]/Yes []              Fevers or chills: No [x]/Yes []  Endocrine:             Temp intolerance:   No [x]/Yes []                       Polydipsia, polyuria:  No [x]/Yes []  Skin:              Rash:    No [x]/Yes []      Ulcers:   No [x]/Yes []              Abnorm pigment: No [x]/Yes []  :              Frequency/urgency:  No [x]/Yes []      Hematuria:    No [x]/Yes []                      Incontinence:    No [x]/Yes []    PHYSICAL EXAM:  There were no vitals filed for this visit. General Appearance: alert and oriented to person, place and time, in no acute distress, well developed and well- nourished  Neurologic: no cranial nerve deficit, speech normal  Head: normocephalic and atraumatic  Eyes: extraocular eye movements intact, conjunctivae normal  ENT: external ear and ear canal normal bilaterally, nose without deformity,   Pulmonary/Chest: normal air movement, no respiratory distress, no carotid bruits  Cardiovascular: normal rate, regular rhythm, no murmur  Abdomen: non-distended, no masses  Musculoskeletal: no joint deformity or tenderness  Extremities: no leg edema bilaterally  Skin: warm and dry, no rash or erythema    PULSE EXAM      Right      Left   Brachial     Radial     Femoral     Popliteal     Dorsalis Pedis     Posterior Tibial 2 2   (3=normal, 2=diminished, 1=barely palpable, 4=widened)    RADIOLOGY:     Problem List Items Addressed This Visit     None      Visit Diagnoses     Atherosclerosis of native artery of both lower extremities with intermittent claudication (HCC)    -  Primary    Carotid stenosis, asymptomatic, bilateral        Relevant Orders    US CAROTID ARTERY BILATERAL          I reviewed with the patient that from my standpoint she can continue with all her normal activities.   I will plan to see her again in one year but asked her to call sooner with any new problems. I reviewed the natural history and treatment options of carotid artery disease. I reviewed the signs and symptoms of stroke, and instructions if symptoms occur. Return in about 1 year (around 6/7/2023) for testing.

## 2022-06-07 NOTE — PROGRESS NOTES
[facility] Heart [facility] Vascular [facility] - [facility]    This is a pre read worksheet - prior to official physician interpretation    Maralisonalejandra Mukesh  1955  Date of study: 6/7/22    Indication for study:  Carotid artery stenosis  Study : Bilateral Carotid Artery Duplex Examination    Duplex examination of the RIGHT carotid artery system identifies atherosclerotic plaque. The peak systolic velocity in internal carotid artery was 263 centimeters / second. The maximum end diastolic velocity was 84 centimeters / second. The ICA/CCA ratio is 2.4. The right vertebral artery has antegrade flow. Duplex examination of the LEFT carotid artery system identifies atherosclerotic plaque. The peak systolic velocity in internal carotid artery was 138 centimeters / second. The maximum end diastolic velocity was 43 centimeters / second. The ICA/CCA ratio is 1.2. The left vertebral artery has antegrade flow.     LEFT  psv        LAST STUDY  11/1/2021  Rt 70-89  Lt 90-99  Left CEA 11/4/2021

## 2022-06-13 NOTE — TELEPHONE ENCOUNTER
Last Appointment:  1/25/2022  Future Appointments   Date Time Provider Isael Adame   6/13/2023  2:00 PM [facility]   6/13/2023  2:30 PM Hema Lion MD VASC/[facility]

## 2022-06-14 RX ORDER — GABAPENTIN 300 MG/1
CAPSULE ORAL
Qty: 90 CAPSULE | Refills: 5 | Status: SHIPPED | OUTPATIENT
Start: 2022-06-14 | End: 2022-07-14

## 2022-09-07 RX ORDER — METOPROLOL SUCCINATE 25 MG/1
TABLET, EXTENDED RELEASE ORAL
Qty: 15 TABLET | Refills: 1 | Status: SHIPPED | OUTPATIENT
Start: 2022-09-07

## 2022-10-01 DIAGNOSIS — Z98.890 S/P CAROTID ENDARTERECTOMY: ICD-10-CM

## 2022-10-01 DIAGNOSIS — I10 ESSENTIAL HYPERTENSION: ICD-10-CM

## 2022-10-06 RX ORDER — CILOSTAZOL 100 MG/1
TABLET ORAL
Qty: 180 TABLET | Refills: 0 | Status: SHIPPED | OUTPATIENT
Start: 2022-10-06

## 2022-10-06 RX ORDER — LISINOPRIL 20 MG/1
TABLET ORAL
Qty: 90 TABLET | Refills: 0 | Status: SHIPPED | OUTPATIENT
Start: 2022-10-06

## 2023-01-06 DIAGNOSIS — Z98.890 S/P CAROTID ENDARTERECTOMY: ICD-10-CM

## 2023-01-06 DIAGNOSIS — I10 ESSENTIAL HYPERTENSION: ICD-10-CM

## 2023-01-06 RX ORDER — METOPROLOL SUCCINATE 25 MG/1
TABLET, EXTENDED RELEASE ORAL
Qty: 45 TABLET | Refills: 0 | Status: SHIPPED
Start: 2023-01-06 | End: 2023-01-10 | Stop reason: SDUPTHER

## 2023-01-06 RX ORDER — LISINOPRIL 20 MG/1
TABLET ORAL
Qty: 90 TABLET | Refills: 0 | Status: SHIPPED
Start: 2023-01-06 | End: 2023-01-10 | Stop reason: SDUPTHER

## 2023-01-06 NOTE — TELEPHONE ENCOUNTER
Last Appointment:  1/25/2022  Future Appointments   Date Time Provider Isael Adame   6/13/2023  2:00 PM [facility]   6/13/2023  2:30 PM Gillian Castrejon MD VASC/[facility]

## 2023-01-10 ENCOUNTER — OFFICE VISIT (OUTPATIENT)
Dept: PRIMARY CARE CLINIC | Age: 68
End: 2023-01-10
Payer: COMMERCIAL

## 2023-01-10 VITALS
BODY MASS INDEX: 30.36 KG/M2 | WEIGHT: 165 LBS | TEMPERATURE: 97.4 F | OXYGEN SATURATION: 99 % | HEART RATE: 77 BPM | HEIGHT: 62 IN | RESPIRATION RATE: 18 BRPM | DIASTOLIC BLOOD PRESSURE: 70 MMHG | SYSTOLIC BLOOD PRESSURE: 126 MMHG

## 2023-01-10 DIAGNOSIS — R68.89 FORGETFULNESS: ICD-10-CM

## 2023-01-10 DIAGNOSIS — G43.109 MIGRAINE WITH AURA AND WITHOUT STATUS MIGRAINOSUS, NOT INTRACTABLE: ICD-10-CM

## 2023-01-10 DIAGNOSIS — I73.9 PAD (PERIPHERAL ARTERY DISEASE) (HCC): Primary | ICD-10-CM

## 2023-01-10 DIAGNOSIS — I10 PRIMARY HYPERTENSION: ICD-10-CM

## 2023-01-10 DIAGNOSIS — Z98.890 S/P CAROTID ENDARTERECTOMY: ICD-10-CM

## 2023-01-10 DIAGNOSIS — I10 ESSENTIAL HYPERTENSION: ICD-10-CM

## 2023-01-10 DIAGNOSIS — I70.213 ATHEROSCLEROSIS OF NATIVE ARTERY OF BOTH LOWER EXTREMITIES WITH INTERMITTENT CLAUDICATION (HCC): ICD-10-CM

## 2023-01-10 DIAGNOSIS — Z86.73 HISTORY OF CEREBROVASCULAR ACCIDENT (CVA) DUE TO ISCHEMIA: ICD-10-CM

## 2023-01-10 DIAGNOSIS — R79.9 ABNORMAL FINDING OF BLOOD CHEMISTRY: ICD-10-CM

## 2023-01-10 DIAGNOSIS — M19.90 ARTHRITIS: ICD-10-CM

## 2023-01-10 DIAGNOSIS — F41.9 ANXIETY: ICD-10-CM

## 2023-01-10 PROBLEM — I65.23 BILATERAL CAROTID ARTERY STENOSIS: Status: RESOLVED | Noted: 2021-03-12 | Resolved: 2023-01-10

## 2023-01-10 PROBLEM — R09.89 DECREASED DORSALIS PEDIS PULSE: Status: RESOLVED | Noted: 2021-02-25 | Resolved: 2023-01-10

## 2023-01-10 PROBLEM — I65.23 ASYMPTOMATIC CAROTID ARTERY STENOSIS, BILATERAL: Status: ACTIVE | Noted: 2021-11-03

## 2023-01-10 LAB
FOLATE: 18.1 NG/ML (ref 4.8–24.2)
SEDIMENTATION RATE, ERYTHROCYTE: 20 MM/HR (ref 0–20)
VITAMIN B-12: 474 PG/ML (ref 211–946)

## 2023-01-10 PROCEDURE — 3078F DIAST BP <80 MM HG: CPT | Performed by: FAMILY MEDICINE

## 2023-01-10 PROCEDURE — 99214 OFFICE O/P EST MOD 30 MIN: CPT | Performed by: FAMILY MEDICINE

## 2023-01-10 PROCEDURE — 1090F PRES/ABSN URINE INCON ASSESS: CPT | Performed by: FAMILY MEDICINE

## 2023-01-10 PROCEDURE — G8484 FLU IMMUNIZE NO ADMIN: HCPCS | Performed by: FAMILY MEDICINE

## 2023-01-10 PROCEDURE — G8417 CALC BMI ABV UP PARAM F/U: HCPCS | Performed by: FAMILY MEDICINE

## 2023-01-10 PROCEDURE — G8399 PT W/DXA RESULTS DOCUMENT: HCPCS | Performed by: FAMILY MEDICINE

## 2023-01-10 PROCEDURE — 3017F COLORECTAL CA SCREEN DOC REV: CPT | Performed by: FAMILY MEDICINE

## 2023-01-10 PROCEDURE — G8427 DOCREV CUR MEDS BY ELIG CLIN: HCPCS | Performed by: FAMILY MEDICINE

## 2023-01-10 PROCEDURE — 4004F PT TOBACCO SCREEN RCVD TLK: CPT | Performed by: FAMILY MEDICINE

## 2023-01-10 PROCEDURE — 1124F ACP DISCUSS-NO DSCNMKR DOCD: CPT | Performed by: FAMILY MEDICINE

## 2023-01-10 PROCEDURE — 3075F SYST BP GE 130 - 139MM HG: CPT | Performed by: FAMILY MEDICINE

## 2023-01-10 RX ORDER — CELECOXIB 100 MG/1
100 CAPSULE ORAL 2 TIMES DAILY
Qty: 60 CAPSULE | Refills: 1 | Status: SHIPPED | OUTPATIENT
Start: 2023-01-10

## 2023-01-10 RX ORDER — LISINOPRIL 20 MG/1
TABLET ORAL
Qty: 90 TABLET | Refills: 1 | Status: SHIPPED | OUTPATIENT
Start: 2023-01-10

## 2023-01-10 RX ORDER — LORAZEPAM 1 MG/1
1 TABLET ORAL DAILY PRN
Qty: 30 TABLET | Refills: 1 | Status: SHIPPED | OUTPATIENT
Start: 2023-01-10 | End: 2023-02-09

## 2023-01-10 RX ORDER — RIMEGEPANT SULFATE 75 MG/75MG
75 TABLET, ORALLY DISINTEGRATING ORAL DAILY PRN
Qty: 30 TABLET | Refills: 0 | Status: SHIPPED | OUTPATIENT
Start: 2023-01-10

## 2023-01-10 RX ORDER — METOPROLOL SUCCINATE 25 MG/1
TABLET, EXTENDED RELEASE ORAL
Qty: 45 TABLET | Refills: 1 | Status: SHIPPED | OUTPATIENT
Start: 2023-01-10

## 2023-01-10 RX ORDER — CILOSTAZOL 100 MG/1
TABLET ORAL
Qty: 180 TABLET | Refills: 1 | Status: SHIPPED | OUTPATIENT
Start: 2023-01-10

## 2023-01-10 RX ORDER — ASPIRIN 81 MG/1
81 TABLET ORAL DAILY
Qty: 90 TABLET | Refills: 1 | Status: SHIPPED | OUTPATIENT
Start: 2023-01-10

## 2023-01-10 RX ORDER — ROSUVASTATIN CALCIUM 40 MG/1
40 TABLET, COATED ORAL EVERY EVENING
Qty: 90 TABLET | Refills: 1 | Status: SHIPPED | OUTPATIENT
Start: 2023-01-10

## 2023-01-10 SDOH — ECONOMIC STABILITY: FOOD INSECURITY: WITHIN THE PAST 12 MONTHS, THE FOOD YOU BOUGHT JUST DIDN'T LAST AND YOU DIDN'T HAVE MONEY TO GET MORE.: NEVER TRUE

## 2023-01-10 SDOH — ECONOMIC STABILITY: FOOD INSECURITY: WITHIN THE PAST 12 MONTHS, YOU WORRIED THAT YOUR FOOD WOULD RUN OUT BEFORE YOU GOT MONEY TO BUY MORE.: NEVER TRUE

## 2023-01-10 ASSESSMENT — PATIENT HEALTH QUESTIONNAIRE - PHQ9
SUM OF ALL RESPONSES TO PHQ QUESTIONS 1-9: 0
2. FEELING DOWN, DEPRESSED OR HOPELESS: 0
SUM OF ALL RESPONSES TO PHQ QUESTIONS 1-9: 0
SUM OF ALL RESPONSES TO PHQ QUESTIONS 1-9: 0
1. LITTLE INTEREST OR PLEASURE IN DOING THINGS: 0
SUM OF ALL RESPONSES TO PHQ QUESTIONS 1-9: 0
SUM OF ALL RESPONSES TO PHQ9 QUESTIONS 1 & 2: 0

## 2023-01-10 ASSESSMENT — SOCIAL DETERMINANTS OF HEALTH (SDOH): HOW HARD IS IT FOR YOU TO PAY FOR THE VERY BASICS LIKE FOOD, HOUSING, MEDICAL CARE, AND HEATING?: NOT HARD AT ALL

## 2023-01-10 NOTE — PROGRESS NOTES
Som Dubois  : 1955    Chief Complaint:     Chief Complaint   Patient presents with    Medication Refill    Hypertension     HPI  PAD asymptomatic, continue same meds including statin and pletal.  Same for hx CVA, no new neuro concerns. Carotid stenosis same plan. HTN controlled, taking meds as rx. Denies symptoms high bp including HA, vision changes, CP, SOB, edema, focal neuro deficits. No symptoms low bp. Smoking cessation. Due for labs. Past medical, surgical, family and social histories reviewed and updated today as appropriate    Health Maintenance Due   Topic Date Due    Hepatitis C screen  Never done    Colorectal Cancer Screen  Never done    Shingles vaccine (1 of 2) Never done    COVID-19 Vaccine (4 - Booster for Taylor Peter series) 2022    Flu vaccine (1) Never done    A1C test (Diabetic or Prediabetic)  2022    Pneumococcal 65+ years Vaccine (2 - PPSV23 if available, else PCV20) 2023    Annual Wellness Visit (AWV)  2023    Breast cancer screen  2023       No current facility-administered medications for this visit. No current outpatient medications on file.      Facility-Administered Medications Ordered in Other Visits   Medication Dose Route Frequency Provider Last Rate Last Admin    metoprolol succinate (TOPROL XL) extended release tablet 25 mg  25 mg Oral Daily Weston Rhoades DO        rosuvastatin (CRESTOR) tablet 40 mg  40 mg Oral QPM Weston Rhoades DO   40 mg at 23 1753    diatrizoate meglumine-sodium (GASTROGRAFIN) 66-10 % solution 120 mL  120 mL Per NG tube ONCE PRN Jameel Cline MD   120 mL at 23 0853    sodium chloride flush 0.9 % injection 10 mL  10 mL IntraVENous 2 times per day Griselda Pretty MD        sodium chloride flush 0.9 % injection 10 mL  10 mL IntraVENous PRN Cecille Wadsworth MD        0.9 % sodium chloride infusion   IntraVENous PRN Griselda Pretty MD        ondansetron (ZOFRAN-ODT) disintegrating tablet 4 mg 4 mg Oral Q8H PRN Kenny Thompson MD        Or    ondansetron (ZOFRAN) injection 4 mg  4 mg IntraVENous Q6H PRN Kenny Thompson MD        0.9 % sodium chloride infusion   IntraVENous Continuous Kenny Thompson  mL/hr at 02/12/23 2143 New Bag at 02/12/23 2143    HYDROmorphone (DILAUDID) injection 0.5 mg  0.5 mg IntraVENous Q3H PRN Kenny Thompson MD   0.5 mg at 02/13/23 1204    enoxaparin (LOVENOX) injection 40 mg  40 mg SubCUTAneous Daily Kenny Thompson MD   40 mg at 02/12/23 1321    labetalol (NORMODYNE;TRANDATE) injection 10 mg  10 mg IntraVENous Q4H PRN Kenny Thompson MD        hydrALAZINE (APRESOLINE) injection 10 mg  10 mg IntraVENous Q30 Min PRN Kenny Thompson MD           Allergies   Allergen Reactions    Codeine      I take vicodin, I had a reaction along time ago    Demerol          REVIEW OF SYSTEMS  Review of Systems   Constitutional:  Negative for chills, diaphoresis, fatigue and fever. Respiratory:  Negative for cough, chest tightness, shortness of breath and wheezing. Cardiovascular:  Negative for chest pain, palpitations and leg swelling. Gastrointestinal:  Negative for abdominal pain, blood in stool, constipation, diarrhea, nausea and vomiting. Musculoskeletal:  Positive for arthralgias. Negative for back pain and myalgias. Skin:  Negative for color change, pallor and rash. Neurological:  Negative for dizziness, syncope, facial asymmetry, speech difficulty, weakness, light-headedness, numbness and headaches. Psychiatric/Behavioral:  Negative for confusion and dysphoric mood. The patient is not nervous/anxious. All other systems reviewed and are negative. PHYSICAL EXAM  /70   Pulse 77   Temp 97.4 °F (36.3 °C)   Resp 18   Ht 5' 2\" (1.575 m)   Wt 165 lb (74.8 kg)   SpO2 99%   BMI 30.18 kg/m²   Physical Exam  Vitals and nursing note reviewed. Constitutional:       General: She is not in acute distress. Appearance: Normal appearance.  She is well-developed. She is not ill-appearing. Neck:      Thyroid: No thyromegaly. Vascular: No JVD. Cardiovascular:      Rate and Rhythm: Normal rate and regular rhythm. Heart sounds: Normal heart sounds. No murmur heard. No friction rub. No gallop. Pulmonary:      Effort: Pulmonary effort is normal. No respiratory distress. Breath sounds: Normal breath sounds. No stridor. No wheezing, rhonchi or rales. Abdominal:      General: Abdomen is flat. Bowel sounds are normal. There is no distension. Palpations: Abdomen is soft. Tenderness: There is no abdominal tenderness. There is no guarding or rebound. Musculoskeletal:      Cervical back: Neck supple. No tenderness. Right lower leg: No edema. Left lower leg: No edema. Lymphadenopathy:      Cervical: No cervical adenopathy. Skin:     General: Skin is warm and dry. Findings: No rash. Neurological:      General: No focal deficit present. Mental Status: She is alert. Psychiatric:         Mood and Affect: Mood normal.         Behavior: Behavior normal.         Thought Content: Thought content normal.         Judgment: Judgment normal.             ASSESSMENT/PLAN:    1. PAD (peripheral artery disease) ([facility])  -     cilostazol (PLETAL) 100 MG tablet; TAKE ONE TABLET BY MOUTH TWO TIMES A DAY, Disp-180 tablet, R-1Normal  2. Atherosclerosis of native artery of both lower extremities with intermittent claudication ([address]  3. History of cerebrovascular accident (CVA) due to ischemia  -     aspirin 81 MG EC tablet; Take 1 tablet by mouth daily, Disp-90 tablet, R-1Normal  -     rosuvastatin (CRESTOR) 40 MG tablet; Take 1 tablet by mouth every evening, Disp-90 tablet, R-1Normal  4. Primary hypertension  5. S/P carotid endarterectomy  -     lisinopril (PRINIVIL;ZESTRIL) 20 MG tablet; TAKE ONE TABLET BY MOUTH EVERY DAY, Disp-90 tablet, R-1Normal  6.  Essential hypertension  -     metoprolol succinate (TOPROL XL) 25 MG extended release tablet; TAKE ONE-HALF TABLET BY MOUTH ONE TIME DAILY, Disp-45 tablet, R-1Normal  -     lisinopril (PRINIVIL;ZESTRIL) 20 MG tablet; TAKE ONE TABLET BY MOUTH EVERY DAY, Disp-90 tablet, R-1Normal  7. Forgetfulness  -     TSH; Future  -     T4, Free; Future  -     Vitamin B12 & Folate; Future  -     SANDY; Future  -     RPR; Future  -     Sedimentation Rate; Future  8. Abnormal finding of blood chemistry  9. Anxiety  -     LORazepam (ATIVAN) 1 MG tablet; Take 1 tablet by mouth daily as needed for Anxiety for up to 30 days. Max Daily Amount: 1 mg, Disp-30 tablet, R-1Normal  10. Arthritis  -     celecoxib (CELEBREX) 100 MG capsule; Take 1 capsule by mouth 2 times daily, Disp-60 capsule, R-1Normal  11. Migraine with aura and without status migrainosus, not intractable        Reviewed age and gender appropriate health screening exams and vaccinations. Advised patient regarding importance of keeping up with recommended health maintenance and to schedule as soon as possible if overdue, as this is important in assessing for undiagnosed pathology, especially cancer. Patient verbalizes understanding and agrees. Call or go to ED immediately if symptoms worsen or persist.  No follow-ups on file. Sooner if necessary. Counseled regarding above diagnosis(es), including possible risks and complications, especially if left uncontrolled. Counseled regarding the possible side effects, risks, benefits and alternatives to treatment; patient and/or guardian verbalizes understanding. Advised patient to call with any new medication issues. All questions answered.     Barbara Ohara MD  1/10/23

## 2023-01-11 LAB
ANTI-NUCLEAR ANTIBODY (ANA): NEGATIVE
RPR: NORMAL

## 2023-02-12 ENCOUNTER — APPOINTMENT (OUTPATIENT)
Dept: CT IMAGING | Age: 68
DRG: 390 | End: 2023-02-12
Payer: MEDICARE

## 2023-02-12 ENCOUNTER — HOSPITAL ENCOUNTER (INPATIENT)
Age: 68
LOS: 2 days | Discharge: HOME | DRG: 390 | End: 2023-02-14
Attending: EMERGENCY MEDICINE | Admitting: SURGERY
Payer: MEDICARE

## 2023-02-12 ENCOUNTER — APPOINTMENT (OUTPATIENT)
Dept: GENERAL RADIOLOGY | Age: 68
DRG: 390 | End: 2023-02-12
Payer: MEDICARE

## 2023-02-12 DIAGNOSIS — K56.609 SMALL BOWEL OBSTRUCTION (HCC): Primary | ICD-10-CM

## 2023-02-12 LAB
ALBUMIN SERPL-MCNC: 4 G/DL (ref 3.5–5.2)
ALP BLD-CCNC: 74 U/L (ref 35–104)
ALT SERPL-CCNC: 17 U/L (ref 0–32)
ANION GAP SERPL CALCULATED.3IONS-SCNC: 12 MMOL/L (ref 7–16)
AST SERPL-CCNC: 19 U/L (ref 0–31)
BASOPHILS ABSOLUTE: 0.03 E9/L (ref 0–0.2)
BASOPHILS RELATIVE PERCENT: 0.2 % (ref 0–2)
BILIRUB SERPL-MCNC: 0.5 MG/DL (ref 0–1.2)
BILIRUBIN URINE: ABNORMAL
BLOOD, URINE: NEGATIVE
BUN BLDV-MCNC: 35 MG/DL (ref 6–23)
CALCIUM SERPL-MCNC: 8.8 MG/DL (ref 8.6–10.2)
CHLORIDE BLD-SCNC: 101 MMOL/L (ref 98–107)
CLARITY: CLEAR
CO2: 23 MMOL/L (ref 22–29)
COLOR: YELLOW
CREAT SERPL-MCNC: 0.8 MG/DL (ref 0.5–1)
EOSINOPHILS ABSOLUTE: 0.01 E9/L (ref 0.05–0.5)
EOSINOPHILS RELATIVE PERCENT: 0.1 % (ref 0–6)
GFR SERPL CREATININE-BSD FRML MDRD: >60 ML/MIN/1.73
GLUCOSE BLD-MCNC: 136 MG/DL (ref 74–99)
GLUCOSE URINE: NEGATIVE MG/DL
HCT VFR BLD CALC: 41.9 % (ref 34–48)
HEMOGLOBIN: 14.5 G/DL (ref 11.5–15.5)
IMMATURE GRANULOCYTES #: 0.07 E9/L
IMMATURE GRANULOCYTES %: 0.6 % (ref 0–5)
KETONES, URINE: 15 MG/DL
LACTIC ACID, SEPSIS: 1.3 MMOL/L (ref 0.5–1.9)
LEUKOCYTE ESTERASE, URINE: NEGATIVE
LIPASE: 15 U/L (ref 13–60)
LYMPHOCYTES ABSOLUTE: 1.52 E9/L (ref 1.5–4)
LYMPHOCYTES RELATIVE PERCENT: 12.3 % (ref 20–42)
MCH RBC QN AUTO: 30.9 PG (ref 26–35)
MCHC RBC AUTO-ENTMCNC: 34.6 % (ref 32–34.5)
MCV RBC AUTO: 89.3 FL (ref 80–99.9)
MONOCYTES ABSOLUTE: 0.79 E9/L (ref 0.1–0.95)
MONOCYTES RELATIVE PERCENT: 6.4 % (ref 2–12)
NEUTROPHILS ABSOLUTE: 9.89 E9/L (ref 1.8–7.3)
NEUTROPHILS RELATIVE PERCENT: 80.4 % (ref 43–80)
NITRITE, URINE: NEGATIVE
PDW BLD-RTO: 13.2 FL (ref 11.5–15)
PH UA: 5.5 (ref 5–9)
PLATELET # BLD: 344 E9/L (ref 130–450)
PMV BLD AUTO: 8.8 FL (ref 7–12)
POTASSIUM REFLEX MAGNESIUM: 4 MMOL/L (ref 3.5–5)
PROTEIN UA: NEGATIVE MG/DL
RBC # BLD: 4.69 E12/L (ref 3.5–5.5)
SODIUM BLD-SCNC: 136 MMOL/L (ref 132–146)
SPECIFIC GRAVITY UA: 1.02 (ref 1–1.03)
TOTAL PROTEIN: 6.6 G/DL (ref 6.4–8.3)
TROPONIN, HIGH SENSITIVITY: 13 NG/L (ref 0–9)
TROPONIN, HIGH SENSITIVITY: 13 NG/L (ref 0–9)
UROBILINOGEN, URINE: 0.2 E.U./DL
WBC # BLD: 12.3 E9/L (ref 4.5–11.5)

## 2023-02-12 PROCEDURE — 83690 ASSAY OF LIPASE: CPT

## 2023-02-12 PROCEDURE — 1200000000 HC SEMI PRIVATE

## 2023-02-12 PROCEDURE — 99285 EMERGENCY DEPT VISIT HI MDM: CPT

## 2023-02-12 PROCEDURE — 93005 ELECTROCARDIOGRAM TRACING: CPT | Performed by: EMERGENCY MEDICINE

## 2023-02-12 PROCEDURE — 85025 COMPLETE CBC W/AUTO DIFF WBC: CPT

## 2023-02-12 PROCEDURE — 6360000002 HC RX W HCPCS

## 2023-02-12 PROCEDURE — 2580000003 HC RX 258

## 2023-02-12 PROCEDURE — 96375 TX/PRO/DX INJ NEW DRUG ADDON: CPT

## 2023-02-12 PROCEDURE — 96374 THER/PROPH/DIAG INJ IV PUSH: CPT

## 2023-02-12 PROCEDURE — 83605 ASSAY OF LACTIC ACID: CPT

## 2023-02-12 PROCEDURE — 87088 URINE BACTERIA CULTURE: CPT

## 2023-02-12 PROCEDURE — 2580000003 HC RX 258: Performed by: EMERGENCY MEDICINE

## 2023-02-12 PROCEDURE — 80053 COMPREHEN METABOLIC PANEL: CPT

## 2023-02-12 PROCEDURE — 81003 URINALYSIS AUTO W/O SCOPE: CPT

## 2023-02-12 PROCEDURE — 6360000004 HC RX CONTRAST MEDICATION: Performed by: RADIOLOGY

## 2023-02-12 PROCEDURE — 6360000002 HC RX W HCPCS: Performed by: EMERGENCY MEDICINE

## 2023-02-12 PROCEDURE — 84484 ASSAY OF TROPONIN QUANT: CPT

## 2023-02-12 PROCEDURE — 36415 COLL VENOUS BLD VENIPUNCTURE: CPT

## 2023-02-12 PROCEDURE — 74177 CT ABD & PELVIS W/CONTRAST: CPT

## 2023-02-12 PROCEDURE — 2500000003 HC RX 250 WO HCPCS: Performed by: EMERGENCY MEDICINE

## 2023-02-12 PROCEDURE — 74018 RADEX ABDOMEN 1 VIEW: CPT

## 2023-02-12 RX ORDER — SODIUM CHLORIDE 9 MG/ML
INJECTION, SOLUTION INTRAVENOUS PRN
Status: DISCONTINUED | OUTPATIENT
Start: 2023-02-12 | End: 2023-02-14 | Stop reason: HOSPADM

## 2023-02-12 RX ORDER — LABETALOL HYDROCHLORIDE 5 MG/ML
10 INJECTION, SOLUTION INTRAVENOUS EVERY 4 HOURS PRN
Status: DISCONTINUED | OUTPATIENT
Start: 2023-02-12 | End: 2023-02-14 | Stop reason: HOSPADM

## 2023-02-12 RX ORDER — SODIUM CHLORIDE 0.9 % (FLUSH) 0.9 %
10 SYRINGE (ML) INJECTION EVERY 12 HOURS SCHEDULED
Status: DISCONTINUED | OUTPATIENT
Start: 2023-02-12 | End: 2023-02-14 | Stop reason: HOSPADM

## 2023-02-12 RX ORDER — MORPHINE SULFATE 4 MG/ML
4 INJECTION, SOLUTION INTRAMUSCULAR; INTRAVENOUS ONCE
Status: DISCONTINUED | OUTPATIENT
Start: 2023-02-12 | End: 2023-02-12

## 2023-02-12 RX ORDER — ONDANSETRON 2 MG/ML
4 INJECTION INTRAMUSCULAR; INTRAVENOUS EVERY 6 HOURS PRN
Status: DISCONTINUED | OUTPATIENT
Start: 2023-02-12 | End: 2023-02-14 | Stop reason: HOSPADM

## 2023-02-12 RX ORDER — HYDRALAZINE HYDROCHLORIDE 20 MG/ML
10 INJECTION INTRAMUSCULAR; INTRAVENOUS EVERY 30 MIN PRN
Status: DISCONTINUED | OUTPATIENT
Start: 2023-02-12 | End: 2023-02-14 | Stop reason: HOSPADM

## 2023-02-12 RX ORDER — SODIUM CHLORIDE 9 MG/ML
INJECTION, SOLUTION INTRAVENOUS CONTINUOUS
Status: DISCONTINUED | OUTPATIENT
Start: 2023-02-12 | End: 2023-02-14

## 2023-02-12 RX ORDER — ONDANSETRON 2 MG/ML
4 INJECTION INTRAMUSCULAR; INTRAVENOUS ONCE
Status: COMPLETED | OUTPATIENT
Start: 2023-02-12 | End: 2023-02-12

## 2023-02-12 RX ORDER — 0.9 % SODIUM CHLORIDE 0.9 %
1000 INTRAVENOUS SOLUTION INTRAVENOUS ONCE
Status: COMPLETED | OUTPATIENT
Start: 2023-02-12 | End: 2023-02-12

## 2023-02-12 RX ORDER — SODIUM CHLORIDE 0.9 % (FLUSH) 0.9 %
10 SYRINGE (ML) INJECTION
Status: ACTIVE | OUTPATIENT
Start: 2023-02-12 | End: 2023-02-13

## 2023-02-12 RX ORDER — SODIUM CHLORIDE 0.9 % (FLUSH) 0.9 %
10 SYRINGE (ML) INJECTION PRN
Status: DISCONTINUED | OUTPATIENT
Start: 2023-02-12 | End: 2023-02-14 | Stop reason: HOSPADM

## 2023-02-12 RX ORDER — MORPHINE SULFATE 4 MG/ML
4 INJECTION, SOLUTION INTRAMUSCULAR; INTRAVENOUS ONCE
Status: COMPLETED | OUTPATIENT
Start: 2023-02-12 | End: 2023-02-12

## 2023-02-12 RX ORDER — ENOXAPARIN SODIUM 100 MG/ML
40 INJECTION SUBCUTANEOUS DAILY
Status: DISCONTINUED | OUTPATIENT
Start: 2023-02-12 | End: 2023-02-14 | Stop reason: HOSPADM

## 2023-02-12 RX ORDER — LABETALOL HYDROCHLORIDE 5 MG/ML
20 INJECTION, SOLUTION INTRAVENOUS ONCE
Status: COMPLETED | OUTPATIENT
Start: 2023-02-12 | End: 2023-02-12

## 2023-02-12 RX ORDER — MORPHINE SULFATE 4 MG/ML
4 INJECTION, SOLUTION INTRAMUSCULAR; INTRAVENOUS ONCE
Status: DISCONTINUED | OUTPATIENT
Start: 2023-02-12 | End: 2023-02-12 | Stop reason: SDUPTHER

## 2023-02-12 RX ORDER — ONDANSETRON 4 MG/1
4 TABLET, ORALLY DISINTEGRATING ORAL EVERY 8 HOURS PRN
Status: DISCONTINUED | OUTPATIENT
Start: 2023-02-12 | End: 2023-02-14 | Stop reason: HOSPADM

## 2023-02-12 RX ADMIN — HYDROMORPHONE HYDROCHLORIDE 0.5 MG: 1 INJECTION, SOLUTION INTRAMUSCULAR; INTRAVENOUS; SUBCUTANEOUS at 16:26

## 2023-02-12 RX ADMIN — MORPHINE SULFATE 4 MG: 4 INJECTION, SOLUTION INTRAMUSCULAR; INTRAVENOUS at 08:20

## 2023-02-12 RX ADMIN — HYDROMORPHONE HYDROCHLORIDE 0.5 MG: 1 INJECTION, SOLUTION INTRAMUSCULAR; INTRAVENOUS; SUBCUTANEOUS at 22:31

## 2023-02-12 RX ADMIN — SODIUM CHLORIDE: 9 INJECTION, SOLUTION INTRAVENOUS at 13:20

## 2023-02-12 RX ADMIN — SODIUM CHLORIDE: 9 INJECTION, SOLUTION INTRAVENOUS at 21:43

## 2023-02-12 RX ADMIN — HYDROMORPHONE HYDROCHLORIDE 0.5 MG: 1 INJECTION, SOLUTION INTRAMUSCULAR; INTRAVENOUS; SUBCUTANEOUS at 09:38

## 2023-02-12 RX ADMIN — SODIUM CHLORIDE 1000 ML: 9 INJECTION, SOLUTION INTRAVENOUS at 08:08

## 2023-02-12 RX ADMIN — ONDANSETRON 4 MG: 2 INJECTION INTRAMUSCULAR; INTRAVENOUS at 08:21

## 2023-02-12 RX ADMIN — ENOXAPARIN SODIUM 40 MG: 100 INJECTION SUBCUTANEOUS at 13:21

## 2023-02-12 RX ADMIN — IOPAMIDOL 75 ML: 755 INJECTION, SOLUTION INTRAVENOUS at 10:13

## 2023-02-12 RX ADMIN — HYDROMORPHONE HYDROCHLORIDE 0.5 MG: 1 INJECTION, SOLUTION INTRAMUSCULAR; INTRAVENOUS; SUBCUTANEOUS at 13:26

## 2023-02-12 RX ADMIN — LABETALOL HYDROCHLORIDE 20 MG: 5 INJECTION, SOLUTION INTRAVENOUS at 11:45

## 2023-02-12 ASSESSMENT — PAIN SCALES - GENERAL
PAINLEVEL_OUTOF10: 5
PAINLEVEL_OUTOF10: 9
PAINLEVEL_OUTOF10: 6
PAINLEVEL_OUTOF10: 10
PAINLEVEL_OUTOF10: 4
PAINLEVEL_OUTOF10: 7
PAINLEVEL_OUTOF10: 5

## 2023-02-12 ASSESSMENT — PAIN DESCRIPTION - LOCATION
LOCATION: ABDOMEN

## 2023-02-12 ASSESSMENT — PAIN DESCRIPTION - FREQUENCY
FREQUENCY: INTERMITTENT
FREQUENCY: INTERMITTENT
FREQUENCY: CONTINUOUS
FREQUENCY: INTERMITTENT

## 2023-02-12 ASSESSMENT — PAIN DESCRIPTION - DIRECTION
RADIATING_TOWARDS: BACK
RADIATING_TOWARDS: BACK

## 2023-02-12 ASSESSMENT — PAIN DESCRIPTION - DESCRIPTORS
DESCRIPTORS: ACHING
DESCRIPTORS: DISCOMFORT;ACHING
DESCRIPTORS: CRAMPING;STABBING;SQUEEZING
DESCRIPTORS: DISCOMFORT;DULL
DESCRIPTORS: DISCOMFORT;TENDER;STABBING
DESCRIPTORS: ACHING;DISCOMFORT;SQUEEZING

## 2023-02-12 ASSESSMENT — PAIN DESCRIPTION - ORIENTATION
ORIENTATION: LEFT;MID
ORIENTATION: LEFT
ORIENTATION: LEFT
ORIENTATION: MID
ORIENTATION: MID

## 2023-02-12 ASSESSMENT — PAIN DESCRIPTION - PAIN TYPE
TYPE: ACUTE PAIN

## 2023-02-12 ASSESSMENT — PAIN - FUNCTIONAL ASSESSMENT: PAIN_FUNCTIONAL_ASSESSMENT: 0-10

## 2023-02-12 ASSESSMENT — PAIN DESCRIPTION - ONSET
ONSET: ON-GOING
ONSET: ON-GOING

## 2023-02-12 NOTE — ED PROVIDER NOTES
[address] [facility] ENCOUNTER        Pt Name: Marycruz Baird  MRN: [id number]  [place] 1955  Date of evaluation: 2/12/2023  Provider: Popeye Burger MD  PCP: Arnulfo Maddox MD  Note Started: 7:57 AM EST 2/12/23    CHIEF COMPLAINT       Chief Complaint   Patient presents with    Abdominal Pain     Mid left abd pain with n/v for 4 days       HISTORY OF PRESENT ILLNESS: 1 or more Elements        Limitations to history : None    Marycruz Baird is a 69 y.o. female who presents for abdominal pain with nausea, vomiting and constipation. She reports that since Thursday she has not had a bowel movement. She reports she feels bloated and constipated. She reports she is having nausea and vomiting. Nonbloody, nonbilious emesis. No fever or chills. No chest pain or shortness of breath. She has no history of bowel obstruction. Initially she denied prior abdominal surgeries but then she told me she had a hysterectomy as well as cholecystectomy. Nursing Notes were all reviewed and agreed with or any disagreements were addressed in the HPI. REVIEW OF EXTERNAL NOTE :     1/10/23 PCP office note    Controlled Substance Monitoring:    Acute and Chronic Pain Monitoring:   RX Monitoring 4/24/2017   Attestation The Prescription Monitoring Report for this patient was reviewed today. REVIEW OF SYSTEMS :      Positives and Pertinent negatives as per HPI.      SURGICAL HISTORY     Past Surgical History:   Procedure Laterality Date    ABLATION OF DYSRHYTHMIC FOCUS      CAROTID ENDARTERECTOMY Left 11/4/2021    LEFT CAROTID ENDARTERECTOMY performed by Anthony Lee MD at [place] [address]      right foot surgery 5 years ago    HYSTERECTOMY (CERVIX STATUS UNKNOWN)      age 25       CURRENTMEDICATIONS       Previous Medications    ASPIRIN 81 MG EC TABLET    Take 1 tablet by mouth daily    CELECOXIB (CELEBREX) 100 MG CAPSULE    Take 1 capsule by mouth 2 times daily    CILOSTAZOL (PLETAL) 100 MG TABLET    TAKE ONE TABLET BY MOUTH TWO TIMES A DAY    LISINOPRIL (PRINIVIL;ZESTRIL) 20 MG TABLET    TAKE ONE TABLET BY MOUTH EVERY DAY    METOPROLOL SUCCINATE (TOPROL XL) 25 MG EXTENDED RELEASE TABLET    TAKE ONE-HALF TABLET BY MOUTH ONE TIME DAILY    RIMEGEPANT SULFATE (NURTEC) 75 MG TBDP    Take 75 mg by mouth daily as needed (severe migraine)    ROSUVASTATIN (CRESTOR) 40 MG TABLET    Take 1 tablet by mouth every evening       ALLERGIES     Codeine and Demerol    FAMILYHISTORY       Family History   Problem Relation Age of Onset    Breast Cancer Mother     Stroke Father         SOCIAL HISTORY       Social History     Tobacco Use    Smoking status: Every Day     Packs/day: 0.50     Types: Cigarettes     Start date: 1/1/1965    Smokeless tobacco: Never   Vaping Use    Vaping Use: Never used   Substance Use Topics    Alcohol use: Yes     Comment: social. rare    Drug use: No       SCREENINGS        [place] Coma Scale  Eye Opening: Spontaneous  Best Verbal Response: Oriented  Best Motor Response: Obeys commands  [place] Coma Scale Score: 15                CIWA Assessment  BP: (!) 151/82  Heart Rate: 76           PHYSICAL EXAM  1 or more Elements     ED Triage Vitals [02/12/23 0751]   BP Temp Temp src Heart Rate Resp SpO2 Height Weight   (!) 128/110 97.9 °F (36.6 °C) -- 95 18 97 % -- 165 lb (74.8 kg)              Constitutional/General: Alert and oriented x3  Head: Normocephalic and atraumatic  Eyes: PERRL, EOMI, conjunctiva normal, sclera non icteric  ENT:  Oropharynx clear, handling secretions,  Neck: Supple, full ROM, no stridor, no meningeal signs  Respiratory: Lungs clear to auscultation bilaterally, no wheezes, rales, or rhonchi. Not in respiratory distress  Cardiovascular:  Regular rate. Regular rhythm. No murmurs, no gallops, no rubs. 2+ distal pulses. Equal extremity pulses.    Chest: No chest wall tenderness  GI:  Abdomen Soft, generalized tenderness. No rebound, guarding, or rigidity. No pulsatile masses. Musculoskeletal: Moves all extremities x 4. Warm and well perfused, no clubbing, no cyanosis, no edema. Capillary refill <3 seconds  Integument: skin warm and dry. No rashes. Neurologic: GCS 15, no focal deficits            DIAGNOSTIC RESULTS   LABS:    Labs Reviewed   COMPREHENSIVE METABOLIC PANEL W/ REFLEX TO MG FOR LOW K - Abnormal; Notable for the following components:       Result Value    Glucose 136 (*)     BUN 35 (*)     All other components within normal limits   CBC WITH AUTO DIFFERENTIAL - Abnormal; Notable for the following components:    WBC 12.3 (*)     MCHC 34.6 (*)     Neutrophils % 80.4 (*)     Lymphocytes % 12.3 (*)     Neutrophils Absolute 9.89 (*)     Eosinophils Absolute 0.01 (*)     All other components within normal limits   TROPONIN - Abnormal; Notable for the following components:    Troponin, High Sensitivity 13 (*)     All other components within normal limits   TROPONIN - Abnormal; Notable for the following components:    Troponin, High Sensitivity 13 (*)     All other components within normal limits    Narrative:      10:57   CULTURE, URINE   LIPASE   LACTATE, SEPSIS   URINALYSIS       As interpreted by me, the above displayed labs are abnormal. All other labs obtained during this visit were within normal range or not returned as of this dictation. RADIOLOGY:   Unless a wet read is documented in the ED course or MDM, non-plain film images such as CT, Ultrasound and MRI are read by the radiologist unless otherwise specified in the medical decision-making. Plain radiographic images are preliminarily interpreted by this ED Provider with the findings documented in the ED Course with official radiology read to follow.     Interpretation per the Radiologist below, if available at the time of this note:    CT ABDOMEN PELVIS W IV CONTRAST Additional Contrast? None   Final Result   1. Findings of distal small bowel obstruction. 2. Intra and extrahepatic biliary dilatation. Distal common bile duct   obstruction cannot be excluded. Correlate with liver enzymes. XR ABDOMEN FOR NG/OG/NE TUBE PLACEMENT    (Results Pending)     No results found. No results found. PAST MEDICAL HISTORY/Chronic Conditions Affecting Care      has a past medical history of Anxiety, Back pain, Bilateral carotid artery stenosis (3/12/2021), Chronic back pain, Decreased dorsalis pedis pulse (2/25/2021), Depression, Discoloration of skin of toe (2/25/2021), GERD (gastroesophageal reflux disease), Headache(784.0), Hyperlipidemia, Hypertension, Left carotid stenosis (2/25/2021), PVD (peripheral vascular disease) with claudication ([address] (6/3/2021), Thyroid disease, Tobacco dependence (2/25/2021), Toe cyanosis (2/25/2021), Ulcer of great toe, left, limited to breakdown of skin ([address] (2/25/2021), and Visual field defects (11/2/2021).      EMERGENCY DEPARTMENT COURSE    Vitals:    Vitals:    02/12/23 0751 02/12/23 0857 02/12/23 1145 02/12/23 1207   BP: (!) 128/110 (!) 189/77 (!) 210/105 (!) 151/82   Pulse: 95 86 95 76   Resp: 18 15 15 14   Temp: 97.9 °F (36.6 °C)      SpO2: 97% 96% 95% 95%   Weight: 165 lb (74.8 kg)          Patient was given the following medications:  Medications   sodium chloride flush 0.9 % injection 10 mL (has no administration in time range)   sodium chloride flush 0.9 % injection 10 mL (has no administration in time range)   sodium chloride flush 0.9 % injection 10 mL (has no administration in time range)   0.9 % sodium chloride infusion (has no administration in time range)   ondansetron (ZOFRAN-ODT) disintegrating tablet 4 mg (has no administration in time range)     Or   ondansetron (ZOFRAN) injection 4 mg (has no administration in time range)   0.9 % sodium chloride infusion (has no administration in time range)   HYDROmorphone (DILAUDID) injection 0.5 mg (has no administration in time range)   enoxaparin (LOVENOX) injection 40 mg (has no administration in time range)   0.9 % sodium chloride bolus (0 mLs IntraVENous Stopped 2/12/23 0911)   ondansetron (ZOFRAN) injection 4 mg (4 mg IntraVENous Given 2/12/23 0273)   morphine sulfate (PF) injection 4 mg (4 mg IntraVENous Given 2/12/23 0820)   HYDROmorphone (DILAUDID) injection 0.5 mg (0.5 mg IntraVENous Given 2/12/23 0938)   iopamidol (ISOVUE-370) 76 % injection 75 mL (75 mLs IntraVENous Given 2/12/23 1013)   labetalol (NORMODYNE;TRANDATE) injection 20 mg (20 mg IntraVENous Given 2/12/23 1145)              Medical Decision Making/Differential Diagnosis:    CC/HPI Summary, Social Determinants of health, Records Reviewed, DDx, testing done/not done, ED Course, Reassessment, disposition considerations/shared decision making with patient, consults, disposition:        ED Course as of 02/12/23 1240   Sun Feb 12, 2023   0932 My independent interpretation of the labs show CMP with glucose 136, troponin 13, lactate normal, CBC with white count 12.3, hemoglobin normal [CD]   0933 EKG Interpretation  Interpreted by emergency department physician, Dr. Osmar Haines     2/12/23  Time: 0801    Rhythm: normal sinus   Rate: normal  Axis: left  Conduction: normal  ST Segments: no acute change  T Waves: no acute change    Clinical Impression: Sinus rhythm, no acute ischemic changes    Comparison to Old EKG  no old EKG     [CD]      ED Course User Index  [CD] Vianey Mcmanus MD       Medical Decision Making   Differential Diagnosis includes but not limited to: SBO, constipation, enteritis. The following tests were interpreted by me: trop 13, cmp bgl 136, cbc with wbc 12.3, lactic 1.3. CT per my wet read with no free air, +dilated loops of bowel. CT read by radiology as SBO.   Conversation had with patient, discussed consult to general surgery, she had seen a different surgeon in the past for colonoscopy and endoscopy and she requested a different surgeon, consult placed to Dr. Ellie Maya from general surgery, he will admit the patient to the hospital.  Nasogastric tube ready ordered. She was given intravenous morphine as well as intravenous Dilaudid. Problems Addressed:  Small bowel obstruction ([address]): acute illness or injury    Amount and/or Complexity of Data Reviewed  Labs: ordered. Decision-making details documented in ED Course. Radiology: ordered and independent interpretation performed. Decision-making details documented in ED Course. ECG/medicine tests: ordered and independent interpretation performed. Decision-making details documented in ED Course. Risk  Prescription drug management. Parenteral controlled substances. Drug therapy requiring intensive monitoring for toxicity. Decision regarding hospitalization. CONSULTS:   Francis Schuster   Unless otherwise noted below, none       CRITICAL CARE TIME (.cct)            I am the Primary Clinician of Record. FINAL IMPRESSION      1. Small bowel obstruction ([address]          DISPOSITION/PLAN     DISPOSITION Admitted 02/12/2023 12:21:13 PM      PATIENT REFERRED TO:  No follow-up provider specified.     DISCHARGE MEDICATIONS:  New Prescriptions    No medications on file       DISCONTINUED MEDICATIONS:  Discontinued Medications    No medications on file              (Please note that portions of this note were completed with a voice recognition program.  Efforts were made to edit the dictations but occasionally words are mis-transcribed.)    Kyra Caputo MD (electronically signed)           Maya Gaona MD  02/12/23 9146

## 2023-02-12 NOTE — ED NOTES
Handoff report given to [facility]      Danny Stewart RN  02/12/23 8602

## 2023-02-12 NOTE — H&P
GENERAL SURGERY  HISTORY AND PHSYICAL  2/12/2023    HPI  Kamaljit Roca is a 69 y.o. female with hx of vascular disease, ARNALDO unknown ovary status, open cholecystectomy who presents for evaluation of abdominal pain, n/v since Thursday. She has never had similar symptoms in the past. States she has not had BM or flatus since Thursday. Non-bilious vomiting with periumbilical and LLQ pain radiating to her back. Last colonoscopy was 8 years ago, there was a polyp. She missed her follow-up colonoscopy 3 years ago. No anticoagulation. Denies weight loss, fevers, night sweats. Afebrile, hypertensive   Wbc 12.3 lactate 1.3. LFT/Bili within normal range   CTAP with dilated loops of bowel with surrounding free fluid and congestion. Fluid over the liver and free fluid in pelvis. Dilated intra and extrehpatic biliary ducts without evidence of obstruction.     Past Medical History:   Diagnosis Date    Anxiety     Back pain     Bilateral carotid artery stenosis 3/12/2021    Chronic back pain     Decreased dorsalis pedis pulse 2/25/2021    Depression     Discoloration of skin of toe 2/25/2021    GERD (gastroesophageal reflux disease)     Headache(784.0)     Hyperlipidemia     Hypertension     Left carotid stenosis 2/25/2021    PVD (peripheral vascular disease) with claudication ([address] 6/3/2021    Thyroid disease     Tobacco dependence 2/25/2021    Toe cyanosis 2/25/2021    Ulcer of great toe, left, limited to breakdown of skin ([address] 2/25/2021    Visual field defects 11/2/2021       Past Surgical History:   Procedure Laterality Date    ABLATION OF DYSRHYTHMIC FOCUS      CAROTID ENDARTERECTOMY Left 11/4/2021    LEFT CAROTID ENDARTERECTOMY performed by Rickey Saxena MD at [place] [address]      right foot surgery 5 years ago    HYSTERECTOMY (CERVIX STATUS UNKNOWN)      age 25       Medications Prior to Admission    Prior to Admission medications    Medication Sig Start Date End Date Taking? Authorizing Provider   celecoxib (CELEBREX) 100 MG capsule Take 1 capsule by mouth 2 times daily 1/10/23   Diego Lucas MD   aspirin 81 MG EC tablet Take 1 tablet by mouth daily 1/10/23   Diego Lucas MD   rosuvastatin (CRESTOR) 40 MG tablet Take 1 tablet by mouth every evening 1/10/23   Diego Lucas MD   cilostazol (PLETAL) 100 MG tablet TAKE ONE TABLET BY MOUTH TWO TIMES A DAY 1/10/23   Diego Lucas MD   metoprolol succinate (TOPROL XL) 25 MG extended release tablet TAKE ONE-HALF TABLET BY MOUTH ONE TIME DAILY 1/10/23   Diego Lucas MD   lisinopril (PRINIVIL;ZESTRIL) 20 MG tablet TAKE ONE TABLET BY MOUTH EVERY DAY 1/10/23   Diego Lucas MD   Rimegepant Sulfate (NURTEC) 75 MG TBDP Take 75 mg by mouth daily as needed (severe migraine) 1/10/23   Diego Lucas MD       Allergies   Allergen Reactions    Codeine      I take vicodin, I had a reaction along time ago    Demerol        Family History   Problem Relation Age of Onset    Breast Cancer Mother     Stroke Father        Social History     Tobacco Use    Smoking status: Every Day     Packs/day: 0.50     Types: Cigarettes     Start date: 1/1/1965    Smokeless tobacco: Never   Vaping Use    Vaping Use: Never used   Substance Use Topics    Alcohol use: Yes     Comment: social. rare    Drug use: No         Review of Systems: pertinent ROS listed in HPI, all others negative       PHYSICAL EXAM:    Vitals:    02/12/23 0857   BP: (!) 189/77   Pulse: 86   Resp: 15   Temp:    SpO2: 96%       GENERAL:  NAD. A&Ox3.  HEAD:  Normocephalic. Atraumatic.   EYES:   No scleral icterus. PERRL.  LUNGS:  No increased work of breathing. On 2L NC  CARDIOVASCULAR: RR  ABDOMEN:  Soft, mildly-distended, mild LLQ-tenderness. No guarding, rigidity, rebound.  EXTREMITIES:   MAEx4. Atraumatic. No LE edema.  SKIN:  Warm and dry  NEUROLOGIC:  GCS 15      ASSESSMENT/PLAN:  67 y.o. female with SBO, likely adhesive. Dilated intra/extrahepatic bile ducts    No  elevation in hepatic function tests or bilirubin   NG to LIWS  NPO, IVF  Pain and nausea control as needed   Monitor cbc, cmp  Monitor for return of bowel function   ICS, pulm hygiene, wean off NC as able   Ambulate as able   Home meds   SCDs, lovenox     Plan discussed with Dr. Christopher Dutta.     Nelson Morrell MD  Surgery Resident PGY-2  2/12/2023  11:06 AM

## 2023-02-13 ENCOUNTER — APPOINTMENT (OUTPATIENT)
Dept: GENERAL RADIOLOGY | Age: 68
DRG: 390 | End: 2023-02-13
Payer: MEDICARE

## 2023-02-13 PROBLEM — M19.90 ARTHRITIS: Status: ACTIVE | Noted: 2023-02-13

## 2023-02-13 PROBLEM — F41.9 ANXIETY: Status: ACTIVE | Noted: 2023-02-13

## 2023-02-13 PROBLEM — G43.109 MIGRAINE WITH AURA AND WITHOUT STATUS MIGRAINOSUS, NOT INTRACTABLE: Status: ACTIVE | Noted: 2023-02-13

## 2023-02-13 LAB
ALBUMIN SERPL-MCNC: 3.5 G/DL (ref 3.5–5.2)
ALP BLD-CCNC: 64 U/L (ref 35–104)
ALT SERPL-CCNC: 21 U/L (ref 0–32)
ANION GAP SERPL CALCULATED.3IONS-SCNC: 10 MMOL/L (ref 7–16)
AST SERPL-CCNC: 17 U/L (ref 0–31)
BASOPHILS ABSOLUTE: 0.04 E9/L (ref 0–0.2)
BASOPHILS RELATIVE PERCENT: 0.4 % (ref 0–2)
BILIRUB SERPL-MCNC: 0.5 MG/DL (ref 0–1.2)
BUN BLDV-MCNC: 30 MG/DL (ref 6–23)
CALCIUM SERPL-MCNC: 8.6 MG/DL (ref 8.6–10.2)
CHLORIDE BLD-SCNC: 107 MMOL/L (ref 98–107)
CO2: 24 MMOL/L (ref 22–29)
CREAT SERPL-MCNC: 0.8 MG/DL (ref 0.5–1)
EKG ATRIAL RATE: 92 BPM
EKG P AXIS: 7 DEGREES
EKG P-R INTERVAL: 162 MS
EKG Q-T INTERVAL: 362 MS
EKG QRS DURATION: 74 MS
EKG QTC CALCULATION (BAZETT): 447 MS
EKG R AXIS: -32 DEGREES
EKG T AXIS: 14 DEGREES
EKG VENTRICULAR RATE: 92 BPM
EOSINOPHILS ABSOLUTE: 0.06 E9/L (ref 0.05–0.5)
EOSINOPHILS RELATIVE PERCENT: 0.6 % (ref 0–6)
GFR SERPL CREATININE-BSD FRML MDRD: >60 ML/MIN/1.73
GLUCOSE BLD-MCNC: 83 MG/DL (ref 74–99)
HCT VFR BLD CALC: 37.1 % (ref 34–48)
HEMOGLOBIN: 12.7 G/DL (ref 11.5–15.5)
IMMATURE GRANULOCYTES #: 0.02 E9/L
IMMATURE GRANULOCYTES %: 0.2 % (ref 0–5)
LYMPHOCYTES ABSOLUTE: 2.24 E9/L (ref 1.5–4)
LYMPHOCYTES RELATIVE PERCENT: 22.4 % (ref 20–42)
MCH RBC QN AUTO: 31.3 PG (ref 26–35)
MCHC RBC AUTO-ENTMCNC: 34.2 % (ref 32–34.5)
MCV RBC AUTO: 91.4 FL (ref 80–99.9)
MONOCYTES ABSOLUTE: 1.02 E9/L (ref 0.1–0.95)
MONOCYTES RELATIVE PERCENT: 10.2 % (ref 2–12)
NEUTROPHILS ABSOLUTE: 6.63 E9/L (ref 1.8–7.3)
NEUTROPHILS RELATIVE PERCENT: 66.2 % (ref 43–80)
PDW BLD-RTO: 13.3 FL (ref 11.5–15)
PLATELET # BLD: 269 E9/L (ref 130–450)
PMV BLD AUTO: 8.7 FL (ref 7–12)
POTASSIUM REFLEX MAGNESIUM: 4 MMOL/L (ref 3.5–5)
RBC # BLD: 4.06 E12/L (ref 3.5–5.5)
SODIUM BLD-SCNC: 141 MMOL/L (ref 132–146)
TOTAL PROTEIN: 5.5 G/DL (ref 6.4–8.3)
WBC # BLD: 10 E9/L (ref 4.5–11.5)

## 2023-02-13 PROCEDURE — 6360000002 HC RX W HCPCS

## 2023-02-13 PROCEDURE — 93010 ELECTROCARDIOGRAM REPORT: CPT | Performed by: INTERNAL MEDICINE

## 2023-02-13 PROCEDURE — 6360000004 HC RX CONTRAST MEDICATION: Performed by: SURGERY

## 2023-02-13 PROCEDURE — 1200000000 HC SEMI PRIVATE

## 2023-02-13 PROCEDURE — 74250 X-RAY XM SM INT 1CNTRST STD: CPT

## 2023-02-13 PROCEDURE — 80053 COMPREHEN METABOLIC PANEL: CPT

## 2023-02-13 PROCEDURE — 85025 COMPLETE CBC W/AUTO DIFF WBC: CPT

## 2023-02-13 PROCEDURE — 6370000000 HC RX 637 (ALT 250 FOR IP)

## 2023-02-13 PROCEDURE — 36415 COLL VENOUS BLD VENIPUNCTURE: CPT

## 2023-02-13 RX ORDER — ROSUVASTATIN CALCIUM 20 MG/1
40 TABLET, COATED ORAL EVERY EVENING
Status: DISCONTINUED | OUTPATIENT
Start: 2023-02-13 | End: 2023-02-14 | Stop reason: HOSPADM

## 2023-02-13 RX ORDER — METOPROLOL SUCCINATE 25 MG/1
25 TABLET, EXTENDED RELEASE ORAL DAILY
Status: DISCONTINUED | OUTPATIENT
Start: 2023-02-13 | End: 2023-02-14 | Stop reason: HOSPADM

## 2023-02-13 RX ADMIN — HYDROMORPHONE HYDROCHLORIDE 0.5 MG: 1 INJECTION, SOLUTION INTRAMUSCULAR; INTRAVENOUS; SUBCUTANEOUS at 12:04

## 2023-02-13 RX ADMIN — ROSUVASTATIN CALCIUM 40 MG: 20 TABLET, FILM COATED ORAL at 17:53

## 2023-02-13 RX ADMIN — HYDROMORPHONE HYDROCHLORIDE 0.5 MG: 1 INJECTION, SOLUTION INTRAMUSCULAR; INTRAVENOUS; SUBCUTANEOUS at 07:36

## 2023-02-13 RX ADMIN — DIATRIZOATE MEGLUMINE AND DIATRIZOATE SODIUM 120 ML: 660; 100 LIQUID ORAL; RECTAL at 08:53

## 2023-02-13 ASSESSMENT — ENCOUNTER SYMPTOMS
CHEST TIGHTNESS: 0
COLOR CHANGE: 0
BLOOD IN STOOL: 0
BACK PAIN: 0
WHEEZING: 0
VOMITING: 0
NAUSEA: 0
COUGH: 0
ABDOMINAL PAIN: 0
DIARRHEA: 0
CONSTIPATION: 0
SHORTNESS OF BREATH: 0

## 2023-02-13 ASSESSMENT — PAIN DESCRIPTION - LOCATION
LOCATION: ABDOMEN
LOCATION: ABDOMEN
LOCATION: THROAT;ABDOMEN

## 2023-02-13 ASSESSMENT — PAIN DESCRIPTION - DESCRIPTORS
DESCRIPTORS: SORE;TENDER
DESCRIPTORS: DISCOMFORT;ACHING
DESCRIPTORS: DISCOMFORT

## 2023-02-13 ASSESSMENT — PAIN DESCRIPTION - DIRECTION: RADIATING_TOWARDS: BACK

## 2023-02-13 ASSESSMENT — PAIN DESCRIPTION - PAIN TYPE: TYPE: ACUTE PAIN

## 2023-02-13 ASSESSMENT — PAIN DESCRIPTION - FREQUENCY: FREQUENCY: INTERMITTENT

## 2023-02-13 ASSESSMENT — PAIN DESCRIPTION - ORIENTATION: ORIENTATION: MID

## 2023-02-13 ASSESSMENT — PAIN SCALES - GENERAL
PAINLEVEL_OUTOF10: 3
PAINLEVEL_OUTOF10: 6
PAINLEVEL_OUTOF10: 6

## 2023-02-13 ASSESSMENT — PAIN DESCRIPTION - ONSET: ONSET: ON-GOING

## 2023-02-13 NOTE — PLAN OF CARE
Problem: Pain  Goal: Verbalizes/displays adequate comfort level or baseline comfort level  2/12/2023 2350 by Esme Tidwell RN  Outcome: Progressing  2/12/2023 1404 by Rosaura Evans RN  Outcome: Progressing     Problem: Safety - Adult  Goal: Free from fall injury  2/12/2023 2350 by Esme Tidwell RN  Outcome: Progressing  2/12/2023 1404 by Rosaura Evans RN  Outcome: Progressing

## 2023-02-13 NOTE — PROGRESS NOTES
GENERAL SURGERY  DAILY PROGRESS NOTE  2/13/2023    Chief Complaint   Patient presents with    Abdominal Pain     Mid left abd pain with n/v for 4 days       Subjective:  Still having some LLQ pain this AM. Patient denies any nausea or emesis. No emesis around NG. States she had 1/2 pitcher of ice cubes overnight. Objective:  /63   Pulse 78   Temp 97.8 °F (36.6 °C) (Temporal)   Resp 18   Ht 5' 2\" (1.575 m)   Wt 165 lb (74.8 kg)   SpO2 99%   BMI 30.18 kg/m²     GENERAL:  Laying in bed, awake, alert, cooperative, no apparent distress  HEAD: Normocephalic, atraumatic. NG with 700cc bilious gastric content   EYES: No sclera icterus, pupils equal  LUNGS:  No increased work of breathing  CARDIOVASCULAR:  regular rate per peripheral pulses   ABDOMEN:  Soft, mild distention.  LLQ TTP   EXTREMITIES: No edema or swelling  SKIN: Warm and dry    Assessment/Plan:  69 y.o. female with small bowel obstruction likely 2/2 adhesive disease     - continue NG   - keep NPO, mIVF  - SBTF today   - await return of bowel function   - encourage OOB and ambulation   - SCDs, Lovenox for DVT prophylaxis     Discussed w Dr. Blake Cervantes     Electronically signed by Shanel Campos MD on 2/13/2023 at 6:39 AM    Seen/examined  Agree with above  Imaging reviewed  Okay to remove NG and start clears  Rowan

## 2023-02-13 NOTE — CARE COORDINATION
2/13/23  Spoke with patient regarding transition of care.  Patient admitted for small bowel obstruction.  Patient has a NG in place and awaiting return of bowel function. Patient is alert and oriented X3.  Patient states she lives alone in a 2 story house.  Patient was independent and driving prior to admit. Patient was also independent with all ADL's and IADL's prior to admit.  PCP is Dr. Diego Lucas and pharmacy  preference is [facility].  Discharge goal is to return home with no needs once medically stable.  /CM to follow.    Electronically signed by MARCIA Cabello on 2/13/2023 at 2:46 PM     Case Management Assessment  Initial Evaluation    Date/Time of Evaluation: 2/13/2023 2:46 PM  Assessment Completed by: MARCIA Cabello    If patient is discharged prior to next notation, then this note serves as note for discharge by case management.    Patient Name: Isabella Ohara                   YOB: 1955  Diagnosis: Small bowel obstruction (HCC) [K56.609]                   Date / Time: 2/12/2023  7:49 AM    Patient Admission Status: Inpatient   Readmission Risk (Low < 19, Mod (19-27), High > 27): Readmission Risk Score: 8.6    Current PCP: Diego Lucas MD  PCP verified by CM? Yes    Chart Reviewed: Yes      History Provided by: Patient  Patient Orientation: Alert and Oriented, Person, Place, Situation    Patient Cognition: Alert    Hospitalization in the last 30 days (Readmission):  No    If yes, Readmission Assessment in  Navigator will be completed.    Advance Directives:      Code Status: Full Code   Patient's Primary Decision Maker is: Patient Declined (Legal Next of Kin Remains as Decision Maker)    Primary Decision Maker: Kya Amato - Abbie - [phone]    Secondary Decision Maker: Shasta Ramey - Brother/Sister - [phone]    Discharge Planning:    Patient lives with:   Type of Home:    Primary Care Giver: Self  Patient Support Systems include: Family  Members, Children   Current Financial resources:    Current community resources:    Current services prior to admission:              Current DME:              Type of Home Care services:       ADLS  Prior functional level: Independent in ADLs/IADLs  Current functional level: Independent in ADLs/IADLs    PT AM-PAC:   /24  OT AM-PAC:   /24    Family can provide assistance at DC: Yes  Would you like Case Management to discuss the discharge plan with any other family members/significant others, and if so, who? No  Plans to Return to Present Housing: Yes  Other Identified Issues/Barriers to RETURNING to current housing: none  Potential Assistance needed at discharge:              Potential DME:    Patient expects to discharge to:    Plan for transportation at discharge:      Financial    Payor: [employer] / Plan: [place] / Product Type: *No Product type* /     Does insurance require precert for SNF: Yes    Potential assistance Purchasing Medications:    Meds-to-Beds request: Yes      [facility] [address] [facility] [address] [place] [address]  Phone: [phone] Fax: [phone]      Notes:    Factors facilitating achievement of predicted outcomes: Family support, Motivated, Cooperative, and Pleasant    Barriers to discharge: Done    Additional Case Management Notes: See above    The Plan for Transition of Care is related to the following treatment goals of Small bowel obstruction ([facility] [address] [X19.541]    IF APPLICABLE: The Patient and/or patient representative Zaid Meneses and her family were provided with a choice of provider and agrees with the discharge plan.  Freedom of choice list with basic dialogue that supports the patient's individualized plan of care/goals and shares the quality data associated with the providers was provided to:     Patient Representative Name:       The Patient and/or Patient Representative Agree with the Discharge Plan?      [place], [place]  Case Management Department  Ph: [phone] Fax:

## 2023-02-14 VITALS
WEIGHT: 165 LBS | HEART RATE: 78 BPM | OXYGEN SATURATION: 97 % | HEIGHT: 62 IN | RESPIRATION RATE: 15 BRPM | TEMPERATURE: 99.2 F | DIASTOLIC BLOOD PRESSURE: 76 MMHG | BODY MASS INDEX: 30.36 KG/M2 | SYSTOLIC BLOOD PRESSURE: 141 MMHG

## 2023-02-14 LAB
ALBUMIN SERPL-MCNC: 3.4 G/DL (ref 3.5–5.2)
ALP BLD-CCNC: 65 U/L (ref 35–104)
ALT SERPL-CCNC: 18 U/L (ref 0–32)
ANION GAP SERPL CALCULATED.3IONS-SCNC: 11 MMOL/L (ref 7–16)
AST SERPL-CCNC: 18 U/L (ref 0–31)
BASOPHILS ABSOLUTE: 0.02 E9/L (ref 0–0.2)
BASOPHILS RELATIVE PERCENT: 0.2 % (ref 0–2)
BILIRUB SERPL-MCNC: 0.4 MG/DL (ref 0–1.2)
BUN BLDV-MCNC: 20 MG/DL (ref 6–23)
CALCIUM SERPL-MCNC: 8.5 MG/DL (ref 8.6–10.2)
CHLORIDE BLD-SCNC: 107 MMOL/L (ref 98–107)
CO2: 22 MMOL/L (ref 22–29)
CREAT SERPL-MCNC: 0.7 MG/DL (ref 0.5–1)
EOSINOPHILS ABSOLUTE: 0.22 E9/L (ref 0.05–0.5)
EOSINOPHILS RELATIVE PERCENT: 2.5 % (ref 0–6)
GFR SERPL CREATININE-BSD FRML MDRD: >60 ML/MIN/1.73
GLUCOSE BLD-MCNC: 107 MG/DL (ref 74–99)
HCT VFR BLD CALC: 37.9 % (ref 34–48)
HEMOGLOBIN: 12.7 G/DL (ref 11.5–15.5)
IMMATURE GRANULOCYTES #: 0.02 E9/L
IMMATURE GRANULOCYTES %: 0.2 % (ref 0–5)
LYMPHOCYTES ABSOLUTE: 2.12 E9/L (ref 1.5–4)
LYMPHOCYTES RELATIVE PERCENT: 23.9 % (ref 20–42)
MCH RBC QN AUTO: 31.5 PG (ref 26–35)
MCHC RBC AUTO-ENTMCNC: 33.5 % (ref 32–34.5)
MCV RBC AUTO: 94 FL (ref 80–99.9)
MONOCYTES ABSOLUTE: 0.94 E9/L (ref 0.1–0.95)
MONOCYTES RELATIVE PERCENT: 10.6 % (ref 2–12)
NEUTROPHILS ABSOLUTE: 5.55 E9/L (ref 1.8–7.3)
NEUTROPHILS RELATIVE PERCENT: 62.6 % (ref 43–80)
PDW BLD-RTO: 12.8 FL (ref 11.5–15)
PLATELET # BLD: 271 E9/L (ref 130–450)
PMV BLD AUTO: 8.9 FL (ref 7–12)
POTASSIUM REFLEX MAGNESIUM: 3.6 MMOL/L (ref 3.5–5)
RBC # BLD: 4.03 E12/L (ref 3.5–5.5)
SODIUM BLD-SCNC: 140 MMOL/L (ref 132–146)
TOTAL PROTEIN: 5.6 G/DL (ref 6.4–8.3)
URINE CULTURE, ROUTINE: NORMAL
WBC # BLD: 8.9 E9/L (ref 4.5–11.5)

## 2023-02-14 PROCEDURE — 2580000003 HC RX 258

## 2023-02-14 PROCEDURE — 6370000000 HC RX 637 (ALT 250 FOR IP)

## 2023-02-14 PROCEDURE — 36415 COLL VENOUS BLD VENIPUNCTURE: CPT

## 2023-02-14 PROCEDURE — 6360000002 HC RX W HCPCS

## 2023-02-14 PROCEDURE — 80053 COMPREHEN METABOLIC PANEL: CPT

## 2023-02-14 PROCEDURE — 85025 COMPLETE CBC W/AUTO DIFF WBC: CPT

## 2023-02-14 RX ORDER — PANTOPRAZOLE SODIUM 40 MG/1
40 TABLET, DELAYED RELEASE ORAL
Status: DISCONTINUED | OUTPATIENT
Start: 2023-02-14 | End: 2023-02-14 | Stop reason: HOSPADM

## 2023-02-14 RX ORDER — OXYCODONE HYDROCHLORIDE 5 MG/1
2.5 TABLET ORAL EVERY 6 HOURS PRN
Qty: 6 TABLET | Refills: 0 | Status: SHIPPED | OUTPATIENT
Start: 2023-02-14 | End: 2023-02-17

## 2023-02-14 RX ORDER — PANTOPRAZOLE SODIUM 40 MG/1
40 TABLET, DELAYED RELEASE ORAL
Qty: 30 TABLET | Refills: 3 | Status: SHIPPED | OUTPATIENT
Start: 2023-02-14

## 2023-02-14 RX ORDER — ONDANSETRON 4 MG/1
4 TABLET, ORALLY DISINTEGRATING ORAL EVERY 8 HOURS PRN
Qty: 21 TABLET | Refills: 0 | Status: SHIPPED | OUTPATIENT
Start: 2023-02-14 | End: 2023-02-21

## 2023-02-14 RX ORDER — ACETAMINOPHEN 325 MG/1
650 TABLET ORAL EVERY 4 HOURS PRN
Status: DISCONTINUED | OUTPATIENT
Start: 2023-02-14 | End: 2023-02-14 | Stop reason: HOSPADM

## 2023-02-14 RX ORDER — OXYCODONE HYDROCHLORIDE 5 MG/1
2.5 TABLET ORAL EVERY 4 HOURS PRN
Status: DISCONTINUED | OUTPATIENT
Start: 2023-02-14 | End: 2023-02-14 | Stop reason: HOSPADM

## 2023-02-14 RX ORDER — OXYCODONE HYDROCHLORIDE 5 MG/1
5 TABLET ORAL EVERY 4 HOURS PRN
Status: DISCONTINUED | OUTPATIENT
Start: 2023-02-14 | End: 2023-02-14 | Stop reason: HOSPADM

## 2023-02-14 RX ADMIN — ENOXAPARIN SODIUM 40 MG: 100 INJECTION SUBCUTANEOUS at 08:32

## 2023-02-14 RX ADMIN — OXYCODONE HYDROCHLORIDE 5 MG: 5 TABLET ORAL at 07:40

## 2023-02-14 RX ADMIN — POTASSIUM BICARBONATE 40 MEQ: 782 TABLET, EFFERVESCENT ORAL at 08:33

## 2023-02-14 RX ADMIN — SODIUM CHLORIDE, PRESERVATIVE FREE 10 ML: 5 INJECTION INTRAVENOUS at 08:32

## 2023-02-14 RX ADMIN — HYDROMORPHONE HYDROCHLORIDE 0.5 MG: 1 INJECTION, SOLUTION INTRAMUSCULAR; INTRAVENOUS; SUBCUTANEOUS at 00:50

## 2023-02-14 RX ADMIN — SODIUM CHLORIDE: 9 INJECTION, SOLUTION INTRAVENOUS at 00:50

## 2023-02-14 RX ADMIN — METOPROLOL SUCCINATE 25 MG: 25 TABLET, EXTENDED RELEASE ORAL at 08:33

## 2023-02-14 RX ADMIN — ONDANSETRON 4 MG: 4 TABLET, ORALLY DISINTEGRATING ORAL at 12:54

## 2023-02-14 RX ADMIN — PANTOPRAZOLE SODIUM 40 MG: 40 TABLET, DELAYED RELEASE ORAL at 15:35

## 2023-02-14 ASSESSMENT — PAIN DESCRIPTION - LOCATION
LOCATION: ABDOMEN;HEAD
LOCATION: ABDOMEN

## 2023-02-14 ASSESSMENT — PAIN SCALES - GENERAL
PAINLEVEL_OUTOF10: 5
PAINLEVEL_OUTOF10: 7

## 2023-02-14 ASSESSMENT — PAIN DESCRIPTION - DESCRIPTORS
DESCRIPTORS: CRAMPING;DISCOMFORT;SORE
DESCRIPTORS: ACHING;DISCOMFORT;SORE

## 2023-02-14 NOTE — PROGRESS NOTES
Discharge instructions given and explained to pt who voiced understanding, denies needs or questions. Awaits meds to bed for scripts.

## 2023-02-14 NOTE — PROGRESS NOTES
GENERAL SURGERY  DAILY PROGRESS NOTE  2/14/2023    Chief Complaint   Patient presents with    Abdominal Pain     Mid left abd pain with n/v for 4 days       Subjective:  NG removed yesterday and started on CLD. Still having some mild abdominal pain. No nausea or emesis. Had multiple loose bowel movements after SBFT yesterday. Objective:  /63   Pulse 72   Temp 97.8 °F (36.6 °C)   Resp 18   Ht 5' 2\" (1.575 m)   Wt 165 lb (74.8 kg)   SpO2 91%   BMI 30.18 kg/m²     GENERAL:  Laying in bed, awake, alert, cooperative, no apparent distress  HEAD: Normocephalic, atraumatic. EYES: No sclera icterus, pupils equal  LUNGS:  No increased work of breathing  CARDIOVASCULAR:  regular rate per peripheral pulses   ABDOMEN:  Soft, mild distention.  LLQ TTP   EXTREMITIES: No edema or swelling  SKIN: Warm and dry    Assessment/Plan:  69 y.o. female with small bowel obstruction likely 2/2 adhesive disease     - advance to regular diet  - dc IVF   - encourage OOB and ambulation   - SCDs, Lovenox for DVT prophylaxis   - if tolerating diet, possible DC this afternoon     Discussed w Dr. Ellie Maya     Electronically signed by Santana Ayers MD on 2/14/2023 at 6:50 AM    Seen/examined  Agree with above  Follow-up in office-patient needs outpatient EGD and colonoscopy  Rowan

## 2023-02-14 NOTE — DISCHARGE SUMMARY
Physician Discharge Summary     Patient ID:  Tu Alfonso  [id number]  69 y.o.  1955    Admit date: 2/12/2023    Discharge date and time: No discharge date for patient encounter. Admitting Physician: Shu Borden MD     Admission Diagnoses: Small bowel obstruction [facility]) [M07.912]    Discharge Diagnoses: Principal Problem:    Small bowel obstruction ([address]  Resolved Problems:    * No resolved hospital problems. *      Admission Condition: fair    Discharged Condition: stable      Hospital Course:  Tu Alfonso is a 69 y.o. female who presented with abdominal pain, nausea, vomiting for multiple days. Work up revealed small bowel obstruction. NG tube was placed for decompression and she was made NPO. She then had a small bowel follow through showing resolution of obstruction, and began passing flatus and had a small bowel movement. NG tube was removed and the patient was started on clears. The patient's course was otherwise uneventful. She progressed well, pain was controlled on PO medications. She was tolerating a regular diet with no nausea or vomiting, and was in a suitable condition for discharge to home in stable condition. Consults:   IP CONSULT TO GENERAL SURGERY    Significant Diagnostic Studies:   CT ABDOMEN PELVIS W IV CONTRAST Additional Contrast? None    Result Date: 2/12/2023  EXAMINATION: CT OF THE ABDOMEN AND PELVIS WITH CONTRAST 2/12/2023 10:14 am TECHNIQUE: CT of the abdomen and pelvis was performed with the administration of intravenous contrast. Multiplanar reformatted images are provided for review. Automated exposure control, iterative reconstruction, and/or weight based adjustment of the mA/kV was utilized to reduce the radiation dose to as low as reasonably achievable. COMPARISON: None.  HISTORY: ORDERING SYSTEM PROVIDED HISTORY: abd pain, vomiting, r/o obstruction TECHNOLOGIST PROVIDED HISTORY: Additional Contrast?->None Reason for exam:->abd pain, vomiting, r/o obstruction Decision Support Exception - unselect if not a suspected or confirmed emergency medical condition->Emergency Medical Condition (MA) What reading provider will be dictating this exam?->CRC FINDINGS: Lower Chest: There is subsegmental atelectasis in the middle lobe. Liver: Moderate intrahepatic ductal dilatation identified. No focal mass identified within the liver. Spleen: Unremarkable Adrenal glands: Unremarkable. Pancreas: Homogenous enhancement without focal mass or ductal dilatation Gallbladder/Biliary tree: Gallbladder is surgically absent. Common bile duct is dilated measuring 2 cm maximally. The duct returns to normal caliber at the ampulla. Distal common bile duct obstruction cannot be excluded. Correlate with liver enzymes. Kidneys: 1.5 cm lower pole right renal cyst and 2 cm mid left renal cyst.  No hydronephrosis noted on either side. Aorta: Normal caliber. Normal enhancement. Atherosclerotic calcification is noted. Appendix: Not visualized GI/Bowel: Moderately dilated fluid-filled small bowel loops are noted. There are decompressed loops in the right lower quadrant. Findings are consistent with small bowel obstruction. No pneumatosis identified. Peritoneum/Retroperitoneum: There is minimal ascites. No free intraperitoneal air identified. No pathologic retroperitoneal lymphadenopathy identified. Pelvis: Urinary bladder is unremarkable in appearance. No pelvic adenopathy or mass identified. Uterus is absent. Bones/Soft Tissues: Lumbar dextroscoliosis with multilevel degenerative change is noted. 1. Findings of distal small bowel obstruction. 2. Intra and extrahepatic biliary dilatation. Distal common bile duct obstruction cannot be excluded. Correlate with liver enzymes. XR ABDOMEN FOR NG/OG/NE TUBE PLACEMENT    Result Date: 2/12/2023  EXAMINATION: ONE SUPINE XRAY VIEW(S) OF THE ABDOMEN 2/12/2023 12:48 pm COMPARISON: None.  HISTORY: ORDERING SYSTEM PROVIDED HISTORY: Confirmation of course of NG/OG/NE tube and location of tip of tube TECHNOLOGIST PROVIDED HISTORY: Reason for exam:->Confirmation of course of NG/OG/NE tube and location of tip of tube Portable? ->Yes What reading provider will be dictating this exam?->CRC FINDINGS: Study not intend to fully evaluate chest or the abdomen. NG tube is in good position towards the upper body of the stomach. Areas of subsegmental atelectasis are seen in the right lower lobe posteriorly and inferiorly/medially. In the upper abdomen the bowel pattern is presently nonspecific     NG tube in good position. FL SMALL BOWEL FOLLOW THROUGH ONLY    Result Date: 2/13/2023  EXAMINATION: GASTROGRAFIN SMALL BOWEL FOLLOW THROUGH SERIES 2/13/2023 TECHNIQUE: Gastrografin small bowel follow through series was performed with serial images obtained. COMPARISON: CT abdomen and pelvis 02/12/2023 HISTORY: ORDERING SYSTEM PROVIDED HISTORY: SBO TECHNOLOGIST PROVIDED HISTORY: Reason for exam:->SBO What reading provider will be dictating this exam?->CRC FINDINGS: On the preliminary  views, the distal enteric tube lies within the proximal stomach. The stomach and colon are nondilated. No constipation. Nonspecific mild gaseous distension of central small bowel loops which show no gross dilatation and with interval improvement. Multiple dilated small bowel loops were seen by CT exam 1 day earlier. Gastrografin contrast was administered via the indwelling enteric tube. The stomach is nondilated and satisfactory gastric emptying. By 1 hour, Gastrografin contrast filling of the majority of the small bowel. No gross small bowel dilatation. Normal small bowel transit time and with contrast filling of the colon by 2 hours. No small bowel transition zone is visualized. Small bowel distension evident by recent CT has resolved. Satisfactory small-bowel transit time and no current findings of small bowel obstruction. Please see comment below.  Comment: Although not common, an internal hernia, specifically a pericecal intermittent internal hernia is a diagnostic consideration as correlated with recent CT exam. RECOMMENDATIONS: Unavailable       Discharge Exam:  GENERAL:  Laying in bed, awake, alert, cooperative, no apparent distress  HEAD: Normocephalic, atraumatic. EYES: No sclera icterus, pupils equal  LUNGS:  No increased work of breathing  CARDIOVASCULAR:  regular rate per peripheral pulses   ABDOMEN:  Soft, mild distention. LLQ TTP   EXTREMITIES: No edema or swelling  SKIN: Warm and dry    Disposition: home    In process/preliminary results:  Outstanding Order Results       Date and Time Order Name Status Description    2/12/2023  1:53 PM Culture, Urine Preliminary             Patient Instructions:   Current Discharge Medication List        START taking these medications    Details   oxyCODONE (ROXICODONE) 5 MG immediate release tablet Take 0.5 tablets by mouth every 6 hours as needed for Pain for up to 3 days.  Max Daily Amount: 10 mg  Qty: 6 tablet, Refills: 0    Comments: Reduce doses taken as pain becomes manageable  Associated Diagnoses: Small bowel obstruction (HCC)      ondansetron (ZOFRAN-ODT) 4 MG disintegrating tablet Take 1 tablet by mouth every 8 hours as needed for Nausea or Vomiting  Qty: 21 tablet, Refills: 0           CONTINUE these medications which have NOT CHANGED    Details   celecoxib (CELEBREX) 100 MG capsule Take 1 capsule by mouth 2 times daily  Qty: 60 capsule, Refills: 1    Associated Diagnoses: Arthritis      aspirin 81 MG EC tablet Take 1 tablet by mouth daily  Qty: 90 tablet, Refills: 1    Associated Diagnoses: History of cerebrovascular accident (CVA) due to ischemia      rosuvastatin (CRESTOR) 40 MG tablet Take 1 tablet by mouth every evening  Qty: 90 tablet, Refills: 1    Associated Diagnoses: History of cerebrovascular accident (CVA) due to ischemia      cilostazol (PLETAL) 100 MG tablet TAKE ONE TABLET BY MOUTH TWO TIMES A DAY  Qty: 180 tablet, Refills: 1    Associated Diagnoses: PAD (peripheral artery disease) (HCC)      metoprolol succinate (TOPROL XL) 25 MG extended release tablet TAKE ONE-HALF TABLET BY MOUTH ONE TIME DAILY  Qty: 45 tablet, Refills: 1    Associated Diagnoses: Essential hypertension      lisinopril (PRINIVIL;ZESTRIL) 20 MG tablet TAKE ONE TABLET BY MOUTH EVERY DAY  Qty: 90 tablet, Refills: 1    Associated Diagnoses: S/P carotid endarterectomy; Essential hypertension      Rimegepant Sulfate (NURTEC) 75 MG TBDP Take 75 mg by mouth daily as needed (severe migraine)  Qty: 30 tablet, Refills: 0             Bowel Blockage (Intestinal Obstruction): Care Instructions  Your Care Instructions  A bowel blockage, also called an intestinal obstruction, can prevent gas, fluids, or solids from moving through the intestines normally. It can cause constipation and, rarely, diarrhea. You may have pain, nausea, vomiting, and cramping. Most of the time, complete blockages require a stay in the hospital and possibly surgery. But if your bowel is only partly blocked, your doctor may tell you to wait until it clears on its own and you are able to pass gas and stool. If so, there are things you can do at home to help make you feel better. If you have had surgery for a bowel blockage, there are things you can do at home to make sure you heal well. You can also make some changes to keep your bowel from becoming blocked again. Follow-up care is a key part of your treatment and safety. Be sure to make and go to all appointments, and call your doctor if you are having problems. It's also a good idea to know your test results and keep a list of the medicines you take. How can you care for yourself at home? If your doctor has told you to wait at home for a blockage to clear on its own: Follow your doctor's instructions. These may include eating a liquid diet to avoid complete blockage. Be safe with medicines. Take your medicines exactly as prescribed.  Call your doctor if you think you are having a problem with your medicine. Put a heating pad set on low on your belly to relieve mild cramps and pain. To prevent another blockage  Try to eat smaller amounts of food more often. For example, have 5 or 6 small meals throughout the day instead of 2 or 3 large meals. Chew your food very well. Try to chew each bite about 20 times or until it is liquid. Avoid high-fiber foods and raw fruits and vegetables with skins, husks, strings, or seeds. These can form a ball of undigested material that can cause a blockage if a part of your bowel is scarred or narrowed. Check with your doctor before you eat whole-grain products or use a fiber supplement such as Citrucel or Metamucil. To help you have regular bowel movements, eat at regular times, do not strain during a bowel movement, and drink plenty of water. If you have kidney, heart, or liver disease and have to limit fluids, talk with your doctor before you increase the amount of fluids you drink. Drink high-calorie liquid formulas if your doctor says to. Severe symptoms may make it hard for your body to take in vitamins and minerals. Get regular exercise. It helps you digest your food better. Get at least 30 minutes of physical activity on most days of the week. Walking is a good choice. When should you call for help? Call your doctor now or seek immediate medical care if:    You have a fever. You are vomiting. You have new or worse belly pain. You cannot pass stools or gas. Watch closely for changes in your health, and be sure to contact your doctor if you have any problems. Where can you learn more? Go to http://www.woods.com/ and enter B082 to learn more about \"Bowel Blockage (Intestinal Obstruction): Care Instructions. \"  Current as of: June 6, 2022               Content Version: 13.5  © 2270-7164 [employer]wise, Incorporated. Care instructions adapted under license by [facility] ([facility]).  If you have questions about a medical condition or this instruction, always ask your healthcare professional. [place] [address] any warranty or liability for your use of this information. Follow up:   Karthikeyan Smith MD  [address] [place] [address],[facility] [address] [facility]  [phone]    Schedule an appointment as soon as possible for a visit         Signed:   Corrie Faust MD  2/14/2023  8:40 AM

## 2023-02-14 NOTE — PROGRESS NOTES
GENERAL SURGERY  DAILY PROGRESS NOTE  2/14/2023  68 yo female w diffuse abdominal pain and n/v    Subjective:  Pt reports having some bilious diarrhea overnight, after eating a very large meal. She also reports some crampy, lower abdominal pain. She states that his pain is an aching pain and isn't too bothersome. She is also reporting slight headache, lightheadedness and is requesting some medication for it. She denies any fever, chills, SOB, n/v, issues with urination, or LLQ specific pain. She claims to be in good spirits and thinks that overall, she is doing well. Objective:  /63   Pulse 72   Temp 97.8 °F (36.6 °C)   Resp 18   Ht 5' 2\" (1.575 m)   Wt 165 lb (74.8 kg)   SpO2 91%   BMI 30.18 kg/m²     Gen: alert, oriented, no apparent distress  HEENT: NCAT, anicteric  CV: RR, no murmurs, gallops, rubs appreciated  Pulm: nonlabored breathing on room air  Abdomen: soft, non distended.  Diffuse, midline abdominal discomfort appreciated on palpation  Skin: warm and dry    Labs 2/14 pending    Assessment/Plan:  69 y.o. female w SBO and biliary dilatation    -PRN pain, headache, nausea control  -Lovenox for DVT/PE prophylaxis  -continue to monitor bowel function on regular diet    Gamal Rodriguez Ace  Surgery Team  2/14/23

## 2023-02-14 NOTE — CARE COORDINATION
Social Work/Case Management Transition of Care Planning ([place] [phone]): Per report, patient had SBFT on 2/13. NG tube has been removed. IVF have been discontinued. Diet has been advanced to a regular diet. Per general surgery, the patient can discharge today if she is able to tolerate her diet. Met with patient at bedside. She will discharge to home with no needs. Family to provide transport. CM/SW will follow. MARCIA Sharma  2/14/2023    Update:  Discharge order noted. Confirmed with patient her daughter will provide transport home.   MARCIA Sharma  2/14/2023

## 2023-02-15 ENCOUNTER — CARE COORDINATION (OUTPATIENT)
Dept: CASE MANAGEMENT | Age: 68
End: 2023-02-15

## 2023-02-15 DIAGNOSIS — K56.609 SMALL BOWEL OBSTRUCTION (HCC): Primary | ICD-10-CM

## 2023-02-15 PROCEDURE — 1111F DSCHRG MED/CURRENT MED MERGE: CPT | Performed by: FAMILY MEDICINE

## 2023-02-15 NOTE — CARE COORDINATION
[address] Care Transitions Initial Follow Up Call    Call within 2 business days of discharge: Yes    Patient Current Location:  Home: [facility] Transition Nurse contacted the patient by telephone to perform post hospital discharge assessment. Verified name and  with patient as identifiers. Provided introduction to self, and explanation of the Care Transition Nurse role. Patient: Dawit Nunes Patient : 1955   MRN: [id number]  Reason for Admission: SBO  Discharge Date: 23 RARS: Readmission Risk Score: 9.4      Last Discharge  [facility]       Date Complaint Diagnosis Description Type Department Provider    23 Abdominal Pain Small bowel obstruction [facility]) ED to Hosp-Admission (Discharged) (ADMITTED) YZ 8WE Sky العراقي MD; MD Pita          Was this an external facility discharge? No Discharge Facility: [address] [place] [address] to be reviewed by the provider   Additional needs identified to be addressed with provider: No  none               Method of communication with provider: none. Spoke with patient today 2/15/23 for initial TCM/hospital discharge (low risk) follow up. Patient states doing better since hospital discharge but has ongoing abdominal pain which is improving. Describes pain as \"tender\" which is tolerable and rates pain 3/10 in severity on pain scale. Patient states she is passing gas and admits last BM was yesterday. Denies any shortness of breath, chest pain, chest discomfort, nausea, vomiting, chills or fever. Noted CT of abdomen/pelvis obtained on admission showed the following below:     1. Findings of distal small bowel obstruction. 2. Intra and extrahepatic biliary dilatation. Distal common bile duct   obstruction cannot be excluded. Correlate with liver enzymes. Patient states she is tolerating eating/drinking. States she will be picking up new meds later today that was ordered on hospital discharge.      Provided a complete review of home meds with patient. Patient aware Protonix was e-scribed to [facility] on [address] and will have other two meds (Oxycodone/Zofran) transferred to [facility]. Provided number for [facility] OP pharmacy to call and have meds transferred to [facility] per patient request. 1111F code entered. Confirmed with patient she will call today to schedule PCP and Gen surg follow up appts. CTN will route to [facility] desk advising of discharge and the need to schedule 7 day HFU appt with Dr. Markel Nazario (PCP). Patient states she is independent in driving and has no transportation needs. Denies any other complaints or needs. Patient is receptive to subsequent follow up. CTN will continue to follow for Care Transition . Care Transition Nurse reviewed discharge instructions, medical action plan, and red flags with patient who verbalized understanding. The patient was given an opportunity to ask questions and does not have any further questions or concerns at this time. Were discharge instructions available to patient? Yes. Reviewed appropriate site of care based on symptoms and resources available to patient including: PCP  Specialist  When to call 911  Condition related references. The patient agrees to contact the PCP office for questions related to their healthcare. Advance Care Planning:   Does patient have an Advance Directive: reviewed and current. Medication reconciliation was performed with patient, who verbalizes understanding of administration of home medications. Medications reviewed, 1111F entered: yes    Was patient discharged with a pulse oximeter? no    Non-face-to-face services provided:  Scheduled appointment with PCP-CTN confirmed with patient she will call today to schedule f/u appt with PCP. CTN will route to [facility] advising of discharge and the need to schedule 7 day HFU appt with Dr. Markel Nazario (PCP).    Scheduled appointment with Specialist-CTN confirmed with patient she will call today to schedule f/u appt with Dr. Blake Cervantes (Gen Surg)  Obtained and reviewed discharge summary and/or continuity of care documents    Offered patient enrollment in the Remote Patient Monitoring (RPM) program for in-home monitoring:  Did not discuss on initial call. .    Care Transitions 24 Hour Call    Schedule Follow Up Appointment with PCP: Declined  Do you have a copy of your discharge instructions?: Yes  Do you have all of your prescriptions and are they filled?: No  Have you been contacted by a [place] Pharmacist?: No  Have you scheduled your follow up appointment?: No  Do you have support at home?: Alone  Do you feel like you have everything you need to keep you well at home?: No  Care Transitions Interventions         Discussed follow-up appointments. If no appointment was previously scheduled, appointment scheduling offered: Yes. Is follow up appointment scheduled within 7 days of discharge? Patient to call herself to schedule f/u appts. Declines needing CTN assistance. Follow Up  Future Appointments   Date Time Provider Isael Adame   6/13/2023  2:00 PM [facility] V[facility]   6/13/2023  2:30 PM Jose M Zamora MD [facility]       Care Transition Nurse provided contact information. Plan for follow-up call in 5-7 days based on severity of symptoms and risk factors. Plan for next call: symptom management-Has abdominal pain improved?  follow-up appointment-Did patient get scheduled for HFU appts with PCP and Gen Surg?     Yared Urrutia, APRN

## 2023-02-16 NOTE — PROGRESS NOTES
CLINICAL PHARMACY NOTE: MEDS TO BEDS    Total # of Prescriptions Filled: 2   The following medications were delivered to the patient:  Oxycodone 5  Ondansetron 4mg odt     Additional Documentation:

## 2023-02-27 ENCOUNTER — CARE COORDINATION (OUTPATIENT)
Dept: CASE MANAGEMENT | Age: 68
End: 2023-02-27

## 2023-02-27 NOTE — CARE COORDINATION
[facility] Care Transitions Follow Up Call    Patient: Christopher Overall  Patient : 1955   MRN: [id number]  Reason for Admission: SBO  Discharge Date: 23 RARS: Readmission Risk Score: 9.4    Attempted to contact patient today 23 for TCM/hospital discharge (low risk) follow up sub call. Left message requesting a return call back to CTN and provided contact information.     Za Weston, APRN

## 2023-03-06 ENCOUNTER — CARE COORDINATION (OUTPATIENT)
Dept: CASE MANAGEMENT | Age: 68
End: 2023-03-06

## 2023-03-06 NOTE — CARE COORDINATION
[facility] Care Transitions Follow Up Call    Care Transition Nurse attempted second subsequent CT outreach leaving Hipaa VM w/ my contact info to primary/secondary numbers. Instructed to schedule Glennx/BARBRA Hernandes appt. CTN s/o. Patient: Dwayne Werner  Patient : 1955   MRN: <[id number]>  Reason for Admission: 2023 - 2023 Caleb Jones 73. SBO.   Discharge Date: 23 RARS: Readmission Risk Score: 9.4    Noemi Saxena RN

## 2023-05-12 ENCOUNTER — OFFICE VISIT (OUTPATIENT)
Dept: PRIMARY CARE CLINIC | Age: 68
End: 2023-05-12
Payer: MEDICARE

## 2023-05-12 VITALS
HEIGHT: 63 IN | OXYGEN SATURATION: 96 % | TEMPERATURE: 98.3 F | WEIGHT: 156.8 LBS | BODY MASS INDEX: 27.78 KG/M2 | DIASTOLIC BLOOD PRESSURE: 80 MMHG | SYSTOLIC BLOOD PRESSURE: 120 MMHG | HEART RATE: 78 BPM

## 2023-05-12 DIAGNOSIS — I10 ESSENTIAL HYPERTENSION: ICD-10-CM

## 2023-05-12 DIAGNOSIS — F41.9 ANXIETY: ICD-10-CM

## 2023-05-12 DIAGNOSIS — I70.213 ATHEROSCLEROSIS OF NATIVE ARTERY OF BOTH LOWER EXTREMITIES WITH INTERMITTENT CLAUDICATION (HCC): ICD-10-CM

## 2023-05-12 DIAGNOSIS — I73.9 PAD (PERIPHERAL ARTERY DISEASE) (HCC): ICD-10-CM

## 2023-05-12 DIAGNOSIS — Z98.890 S/P CAROTID ENDARTERECTOMY: ICD-10-CM

## 2023-05-12 DIAGNOSIS — R79.9 ABNORMAL FINDING OF BLOOD CHEMISTRY, UNSPECIFIED: ICD-10-CM

## 2023-05-12 DIAGNOSIS — Z86.73 HISTORY OF CEREBROVASCULAR ACCIDENT (CVA) DUE TO ISCHEMIA: ICD-10-CM

## 2023-05-12 DIAGNOSIS — Z00.00 INITIAL MEDICARE ANNUAL WELLNESS VISIT: Primary | ICD-10-CM

## 2023-05-12 PROBLEM — K56.609 SMALL BOWEL OBSTRUCTION (HCC): Status: RESOLVED | Noted: 2023-02-12 | Resolved: 2023-05-12

## 2023-05-12 PROCEDURE — 3017F COLORECTAL CA SCREEN DOC REV: CPT | Performed by: FAMILY MEDICINE

## 2023-05-12 PROCEDURE — 3074F SYST BP LT 130 MM HG: CPT | Performed by: FAMILY MEDICINE

## 2023-05-12 PROCEDURE — G0438 PPPS, INITIAL VISIT: HCPCS | Performed by: FAMILY MEDICINE

## 2023-05-12 PROCEDURE — 1124F ACP DISCUSS-NO DSCNMKR DOCD: CPT | Performed by: FAMILY MEDICINE

## 2023-05-12 PROCEDURE — 3078F DIAST BP <80 MM HG: CPT | Performed by: FAMILY MEDICINE

## 2023-05-12 RX ORDER — LISINOPRIL 20 MG/1
TABLET ORAL
Qty: 90 TABLET | Refills: 1 | Status: SHIPPED | OUTPATIENT
Start: 2023-05-12

## 2023-05-12 RX ORDER — ASPIRIN 81 MG/1
81 TABLET ORAL DAILY
Qty: 90 TABLET | Refills: 1 | Status: SHIPPED | OUTPATIENT
Start: 2023-05-12

## 2023-05-12 RX ORDER — ROSUVASTATIN CALCIUM 40 MG/1
40 TABLET, COATED ORAL EVERY EVENING
Qty: 90 TABLET | Refills: 1 | Status: SHIPPED | OUTPATIENT
Start: 2023-05-12

## 2023-05-12 RX ORDER — LORAZEPAM 1 MG/1
1 TABLET ORAL DAILY PRN
Qty: 30 TABLET | Refills: 2 | Status: SHIPPED | OUTPATIENT
Start: 2023-05-12 | End: 2023-06-11

## 2023-05-12 RX ORDER — METOPROLOL SUCCINATE 25 MG/1
TABLET, EXTENDED RELEASE ORAL
Qty: 45 TABLET | Refills: 1 | Status: SHIPPED | OUTPATIENT
Start: 2023-05-12

## 2023-05-12 RX ORDER — CILOSTAZOL 100 MG/1
TABLET ORAL
Qty: 180 TABLET | Refills: 1 | Status: SHIPPED | OUTPATIENT
Start: 2023-05-12

## 2023-05-12 SDOH — ECONOMIC STABILITY: FOOD INSECURITY: WITHIN THE PAST 12 MONTHS, YOU WORRIED THAT YOUR FOOD WOULD RUN OUT BEFORE YOU GOT MONEY TO BUY MORE.: NEVER TRUE

## 2023-05-12 SDOH — ECONOMIC STABILITY: FOOD INSECURITY: WITHIN THE PAST 12 MONTHS, THE FOOD YOU BOUGHT JUST DIDN'T LAST AND YOU DIDN'T HAVE MONEY TO GET MORE.: NEVER TRUE

## 2023-05-12 SDOH — ECONOMIC STABILITY: INCOME INSECURITY: HOW HARD IS IT FOR YOU TO PAY FOR THE VERY BASICS LIKE FOOD, HOUSING, MEDICAL CARE, AND HEATING?: NOT HARD AT ALL

## 2023-05-12 SDOH — ECONOMIC STABILITY: HOUSING INSECURITY
IN THE LAST 12 MONTHS, WAS THERE A TIME WHEN YOU DID NOT HAVE A STEADY PLACE TO SLEEP OR SLEPT IN A SHELTER (INCLUDING NOW)?: NO

## 2023-05-12 ASSESSMENT — PATIENT HEALTH QUESTIONNAIRE - PHQ9
SUM OF ALL RESPONSES TO PHQ QUESTIONS 1-9: 0
2. FEELING DOWN, DEPRESSED OR HOPELESS: 0
1. LITTLE INTEREST OR PLEASURE IN DOING THINGS: 0
SUM OF ALL RESPONSES TO PHQ QUESTIONS 1-9: 0
SUM OF ALL RESPONSES TO PHQ9 QUESTIONS 1 & 2: 0

## 2023-05-12 ASSESSMENT — LIFESTYLE VARIABLES
HOW MANY STANDARD DRINKS CONTAINING ALCOHOL DO YOU HAVE ON A TYPICAL DAY: PATIENT DOES NOT DRINK
HOW OFTEN DO YOU HAVE A DRINK CONTAINING ALCOHOL: NEVER

## 2023-05-22 ENCOUNTER — TELEPHONE (OUTPATIENT)
Dept: PHARMACY | Facility: CLINIC | Age: 68
End: 2023-05-22

## 2023-05-22 NOTE — TELEPHONE ENCOUNTER
[facility] CLINICAL PHARMACY: ADHERENCE REVIEW  Identified care gap per [place] fills with [facility]: ACE/ARB and Statin adherence      Recent Visits  Date Type Provider Dept   05/12/23 Office Visit Cheryle Heritage, MD [facility]   01/10/23 Office Visit Cheryle Heritage, MD [facility]   01/25/22 Office Visit Cheryle Heritage, MD [facility]   Showing recent visits within past 540 days with a meds authorizing provider and meeting all other requirements  Future Appointments  No visits were found meeting these conditions. Showing future appointments within next 150 days with a meds authorizing provider and meeting all other requirements    Patient also appears to be prescribed:No Others in the metric at this time  Per insurer report, LIS-0 - co-pays are based on tiers and patient is subject to coverage gap. ASSESSMENT    ACE/ARB ADHERENCE    Insurance Records claims through 05/15/2023 (Prior Year [place] = not reported; YTD Tulio Oneal = 92%; Potential Fail Date: 09.15.23):   Lisinopril last filled on 04.15.23 for 90 day supply. Next refill due: 07.14.23    Per Insurer Portal: last filled on (same as above). BP Readings from Last 3 Encounters:   05/12/23 120/80   02/14/23 (!) 141/76   01/10/23 126/70     Estimated Creatinine Clearance: 71 mL/min (based on SCr of 0.7 mg/dL). Lab Results   Component Value Date    CREATININE 0.7 02/14/2023     Lab Results   Component Value Date    K 3.6 02/14/2023       STATIN ADHERENCE    Insurance Records claims through 05/15/2023 (Prior Year [place] = not reported; YTD Tulio Oneal = FIRST FILL; Potential Fail Date: 06.20.23):   Rosuvastatin last filled on 05.12.23 for 90 day supply. Next refill due: 08.10.23    Per Insurer Portal: last filled on (same as above).       Lab Results   Component Value Date    CHOL 144 03/17/2022    TRIG 63 03/17/2022    HDL 60 03/17/2022    LDLCALC 71 03/17/2022     ALT   Date Value Ref Range Status   02/14/2023 18 0 - 32 U/L Final     AST   Date Value

## 2023-05-24 DIAGNOSIS — I65.23 BILATERAL CAROTID ARTERY STENOSIS: Primary | ICD-10-CM

## 2023-08-09 DIAGNOSIS — F41.9 ANXIETY: ICD-10-CM

## 2023-08-09 RX ORDER — PANTOPRAZOLE SODIUM 40 MG/1
TABLET, DELAYED RELEASE ORAL
Qty: 30 TABLET | Refills: 0 | OUTPATIENT
Start: 2023-08-09

## 2023-08-09 NOTE — TELEPHONE ENCOUNTER
Last Appointment:  5/12/2023  Future Appointments   Date Time Provider [address]   6/25/2024  1:00 PM [facility] VAS US  [address]   6/25/2024  1:30 PM Patricia Broderick MD VAS/[facility]

## 2023-08-10 RX ORDER — LORAZEPAM 1 MG/1
TABLET ORAL
Qty: 30 TABLET | Refills: 0 | Status: SHIPPED | OUTPATIENT
Start: 2023-08-10 | End: 2023-09-09

## 2023-08-17 ENCOUNTER — OFFICE VISIT (OUTPATIENT)
Dept: PRIMARY CARE CLINIC | Age: 68
End: 2023-08-17
Payer: MEDICARE

## 2023-08-17 VITALS
DIASTOLIC BLOOD PRESSURE: 78 MMHG | OXYGEN SATURATION: 96 % | HEIGHT: 63 IN | HEART RATE: 75 BPM | WEIGHT: 162.2 LBS | TEMPERATURE: 98.1 F | BODY MASS INDEX: 28.74 KG/M2 | RESPIRATION RATE: 16 BRPM | SYSTOLIC BLOOD PRESSURE: 120 MMHG

## 2023-08-17 DIAGNOSIS — J01.10 ACUTE NON-RECURRENT FRONTAL SINUSITIS: Primary | ICD-10-CM

## 2023-08-17 DIAGNOSIS — R05.9 COUGH IN ADULT: ICD-10-CM

## 2023-08-17 PROCEDURE — 1124F ACP DISCUSS-NO DSCNMKR DOCD: CPT | Performed by: NURSE PRACTITIONER

## 2023-08-17 PROCEDURE — 4004F PT TOBACCO SCREEN RCVD TLK: CPT | Performed by: NURSE PRACTITIONER

## 2023-08-17 PROCEDURE — 99213 OFFICE O/P EST LOW 20 MIN: CPT | Performed by: NURSE PRACTITIONER

## 2023-08-17 PROCEDURE — 1090F PRES/ABSN URINE INCON ASSESS: CPT | Performed by: NURSE PRACTITIONER

## 2023-08-17 PROCEDURE — G8399 PT W/DXA RESULTS DOCUMENT: HCPCS | Performed by: NURSE PRACTITIONER

## 2023-08-17 PROCEDURE — 3017F COLORECTAL CA SCREEN DOC REV: CPT | Performed by: NURSE PRACTITIONER

## 2023-08-17 PROCEDURE — 3074F SYST BP LT 130 MM HG: CPT | Performed by: NURSE PRACTITIONER

## 2023-08-17 PROCEDURE — 87426 SARSCOV CORONAVIRUS AG IA: CPT | Performed by: NURSE PRACTITIONER

## 2023-08-17 PROCEDURE — G8417 CALC BMI ABV UP PARAM F/U: HCPCS | Performed by: NURSE PRACTITIONER

## 2023-08-17 PROCEDURE — G8427 DOCREV CUR MEDS BY ELIG CLIN: HCPCS | Performed by: NURSE PRACTITIONER

## 2023-08-17 PROCEDURE — 3078F DIAST BP <80 MM HG: CPT | Performed by: NURSE PRACTITIONER

## 2023-08-17 RX ORDER — AMOXICILLIN 500 MG/1
500 CAPSULE ORAL 3 TIMES DAILY
Qty: 21 CAPSULE | Refills: 0 | Status: SHIPPED | OUTPATIENT
Start: 2023-08-17 | End: 2023-08-24

## 2023-08-17 RX ORDER — FLUTICASONE PROPIONATE 50 MCG
1 SPRAY, SUSPENSION (ML) NASAL 2 TIMES DAILY
Qty: 16 G | Refills: 0 | Status: SHIPPED | OUTPATIENT
Start: 2023-08-17

## 2023-08-17 NOTE — PROGRESS NOTES
Chief Complaint:   Cough (Patient states she is having a cough, nasal drainage, HA and nausea x2 weeks.)    History of Present Illness   Source of history provided by:  patient. Lucy Palacio is a 77 y.o. old female who presents to walk-in for evaluation of sinus pressure, nasal congestion, discolored nasal drainage, bilateral ear pressure, mild productive cough and sore throat x 2 weeks. Has been taking sinus meds and antihistamines OTC without relief. Denies any fever, chills, wheezing, CP, SOB, or GI symptoms. Denies any hx of asthma, COPD, or tobacco use. Review of Systems   Unless otherwise stated in this report or unable to obtain because of the patient's clinical or mental status as evidenced by the medical record, this patients's positive and negative responses for Review of Systems, constitutional, psych, eyes, ENT, cardiovascular, respiratory, gastrointestinal, neurological, genitourinary, musculoskeletal, integument systems and systems related to the presenting problem are either stated in the preceding or were negative for the symptoms and/or complaints related to the medical problem. Past Medical History:  has a past medical history of Anxiety, Back pain, Bilateral carotid artery stenosis, Chronic back pain, Decreased dorsalis pedis pulse, Depression, Discoloration of skin of toe, GERD (gastroesophageal reflux disease), Headache(784.0), Hyperlipidemia, Hypertension, Left carotid stenosis, PVD (peripheral vascular disease) with claudication ([address]), Thyroid disease, Tobacco dependence, Toe cyanosis, Ulcer of great toe, left, limited to breakdown of skin ([address]), and Visual field defects. Past Surgical History:  has a past surgical history that includes ablation of dysrhythmic focus; Cholecystectomy; Carpal tunnel release; Foot surgery; Carotid endarterectomy (Left, 11/4/2021); and Hysterectomy. Social History:  reports that she has been smoking cigarettes.  She started smoking about 56

## 2023-09-12 ENCOUNTER — TELEPHONE (OUTPATIENT)
Dept: PHARMACY | Facility: CLINIC | Age: 68
End: 2023-09-12

## 2023-09-12 NOTE — TELEPHONE ENCOUNTER
[facility] CLINICAL PHARMACY: ADHERENCE REVIEW  Identified care gap per [facility] ([place] Per insurer report, LIS-0 - co-pays are based on tiers and patient is subject to coverage gap.    fills with [employer] [facility]: ACE/ARB and Statin adherence    Patient also appears to be prescribed:No Others in the metric at this time      [facility] Records claims through 08/27/2023 (Prior Year [address] = not reported; YTD [address] = 96%; Potential Fail Date: 12.14.23):   Lisinopril last filled on 07.14.23 for 90 day supply. Next refill due: 10.12.23    Per Insurer Portal: last filled on (same as above). .     BP Readings from Last 3 Encounters:   08/17/23 120/78   05/12/23 120/80   02/14/23 (!) 141/76     CrCl cannot be calculated (Patient's most recent lab result is older than the maximum 180 days allowed. ). Lab Results   Component Value Date    CREATININE 0.7 02/14/2023     Lab Results   Component Value Date    K 3.6 02/14/2023 [facility] Records claims through 09/10/2023 (Prior Year [address] = not reported; YTD [address] = 80%; Potential Fail Date: 09.17.23):   Rosuvastatin last filled on 05.12.23 for 90 day supply. Next refill due: 08.10.23    Per Insurer Portal: last filled on (same as above). .    Lab Results   Component Value Date    CHOL 144 03/17/2022    TRIG 63 03/17/2022    HDL 60 03/17/2022    LDLCALC 71 03/17/2022     ALT   Date Value Ref Range Status   02/14/2023 18 0 - 32 U/L Final     AST   Date Value Ref Range Status   02/14/2023 18 0 - 31 U/L Final     The 10-year ASCVD risk score (Mckayla GUERRERO, et al., 2019) is: 15.9%    Values used to calculate the score:      Age: 77 years      Sex: Female      Is Non- : No      Diabetic: No      Tobacco smoker: Yes      Systolic Blood Pressure: 609 mmHg      Is BP treated: Yes      HDL Cholesterol: 60 mg/dL      Total Cholesterol: 144 mg/dL        PLAN  The following are interventions that have been

## 2023-10-09 ENCOUNTER — TELEPHONE (OUTPATIENT)
Dept: PRIMARY CARE CLINIC | Age: 68
End: 2023-10-09

## 2023-10-09 NOTE — TELEPHONE ENCOUNTER
Patient called stating she is having dizziness off and on with blurred vision. Happened in the past when was on Gabapentin but stopped that medication a \"long time ago\". Patient was on  at most recent instance and it lasted a minute, patient was sitting and not moving head to much and did not go to stand or sit. Has not had Ativan over a month ago, unsure if this is related. When symptoms happened in the past would take an Ativan and feels they helped her symptoms. Has panic attacks. No chest pains but has SOB but is a smoker and has those symptoms normally. Last week was sitting on back porch and had a similar instance that lasted a minute \"eyes felt like looking through a kaleidescope\". No headaches. Has hx of strokes. Scheduled an appt with Dr. Ceci Patel. Advised if symptoms worsen, severe HA or CP to call 911 or to to ER.      Last Appointment:  5/12/2023  Future Appointments   Date Time Provider [address]   10/11/2023  2:30 PM Vesta Tatum MD [facility]   6/25/2024  1:00 PM HP VAS US RM 1 SEYZ CARDIO [facility] Rad/Car   6/25/2024  1:30 PM Vega Richardson MD [facility]

## 2023-10-11 ENCOUNTER — OFFICE VISIT (OUTPATIENT)
Dept: PRIMARY CARE CLINIC | Age: 68
End: 2023-10-11
Payer: MEDICARE

## 2023-10-11 VITALS
HEIGHT: 63 IN | OXYGEN SATURATION: 97 % | RESPIRATION RATE: 16 BRPM | WEIGHT: 162.8 LBS | HEART RATE: 83 BPM | TEMPERATURE: 97.2 F | DIASTOLIC BLOOD PRESSURE: 86 MMHG | SYSTOLIC BLOOD PRESSURE: 169 MMHG | BODY MASS INDEX: 28.84 KG/M2

## 2023-10-11 DIAGNOSIS — I73.9 PAD (PERIPHERAL ARTERY DISEASE) (HCC): ICD-10-CM

## 2023-10-11 DIAGNOSIS — F41.9 ANXIETY: ICD-10-CM

## 2023-10-11 DIAGNOSIS — R73.03 PREDIABETES: Primary | ICD-10-CM

## 2023-10-11 DIAGNOSIS — G43.109 MIGRAINE WITH AURA AND WITHOUT STATUS MIGRAINOSUS, NOT INTRACTABLE: ICD-10-CM

## 2023-10-11 DIAGNOSIS — I70.213 ATHEROSCLEROSIS OF NATIVE ARTERY OF BOTH LOWER EXTREMITIES WITH INTERMITTENT CLAUDICATION (HCC): ICD-10-CM

## 2023-10-11 DIAGNOSIS — Z98.890 S/P CAROTID ENDARTERECTOMY: ICD-10-CM

## 2023-10-11 DIAGNOSIS — I10 ESSENTIAL HYPERTENSION: ICD-10-CM

## 2023-10-11 DIAGNOSIS — I65.23 ASYMPTOMATIC CAROTID ARTERY STENOSIS, BILATERAL: ICD-10-CM

## 2023-10-11 DIAGNOSIS — Z86.73 HISTORY OF CEREBROVASCULAR ACCIDENT (CVA) DUE TO ISCHEMIA: ICD-10-CM

## 2023-10-11 DIAGNOSIS — M54.10 RADICULAR LOW BACK PAIN: ICD-10-CM

## 2023-10-11 DIAGNOSIS — R42 VERTIGO: ICD-10-CM

## 2023-10-11 DIAGNOSIS — G43.809 VESTIBULAR MIGRAINE: ICD-10-CM

## 2023-10-11 DIAGNOSIS — R42 DIZZINESS: ICD-10-CM

## 2023-10-11 LAB — HBA1C MFR BLD: 6.1 %

## 2023-10-11 PROCEDURE — G8399 PT W/DXA RESULTS DOCUMENT: HCPCS | Performed by: FAMILY MEDICINE

## 2023-10-11 PROCEDURE — 1090F PRES/ABSN URINE INCON ASSESS: CPT | Performed by: FAMILY MEDICINE

## 2023-10-11 PROCEDURE — 93000 ELECTROCARDIOGRAM COMPLETE: CPT | Performed by: FAMILY MEDICINE

## 2023-10-11 PROCEDURE — 3078F DIAST BP <80 MM HG: CPT | Performed by: FAMILY MEDICINE

## 2023-10-11 PROCEDURE — 83036 HEMOGLOBIN GLYCOSYLATED A1C: CPT | Performed by: FAMILY MEDICINE

## 2023-10-11 PROCEDURE — G8484 FLU IMMUNIZE NO ADMIN: HCPCS | Performed by: FAMILY MEDICINE

## 2023-10-11 PROCEDURE — 4004F PT TOBACCO SCREEN RCVD TLK: CPT | Performed by: FAMILY MEDICINE

## 2023-10-11 PROCEDURE — 1124F ACP DISCUSS-NO DSCNMKR DOCD: CPT | Performed by: FAMILY MEDICINE

## 2023-10-11 PROCEDURE — 3074F SYST BP LT 130 MM HG: CPT | Performed by: FAMILY MEDICINE

## 2023-10-11 PROCEDURE — 3017F COLORECTAL CA SCREEN DOC REV: CPT | Performed by: FAMILY MEDICINE

## 2023-10-11 PROCEDURE — G8417 CALC BMI ABV UP PARAM F/U: HCPCS | Performed by: FAMILY MEDICINE

## 2023-10-11 PROCEDURE — 99214 OFFICE O/P EST MOD 30 MIN: CPT | Performed by: FAMILY MEDICINE

## 2023-10-11 PROCEDURE — G8427 DOCREV CUR MEDS BY ELIG CLIN: HCPCS | Performed by: FAMILY MEDICINE

## 2023-10-11 RX ORDER — FLUTICASONE PROPIONATE 50 MCG
1 SPRAY, SUSPENSION (ML) NASAL 2 TIMES DAILY
Qty: 32 G | Refills: 1 | Status: SHIPPED | OUTPATIENT
Start: 2023-10-11

## 2023-10-11 RX ORDER — CILOSTAZOL 100 MG/1
TABLET ORAL
Qty: 180 TABLET | Refills: 1 | Status: SHIPPED | OUTPATIENT
Start: 2023-10-11

## 2023-10-11 RX ORDER — LISINOPRIL 40 MG/1
TABLET ORAL
Qty: 90 TABLET | Refills: 1 | Status: SHIPPED | OUTPATIENT
Start: 2023-10-11

## 2023-10-11 RX ORDER — BUPROPION HYDROCHLORIDE 150 MG/1
TABLET ORAL
Qty: 90 TABLET | Refills: 1 | Status: SHIPPED | OUTPATIENT
Start: 2023-10-11

## 2023-10-11 RX ORDER — PREGABALIN 25 MG/1
25 CAPSULE ORAL 2 TIMES DAILY
Qty: 60 CAPSULE | Refills: 1 | Status: SHIPPED | OUTPATIENT
Start: 2023-10-11 | End: 2023-11-10

## 2023-10-11 RX ORDER — LORAZEPAM 1 MG/1
TABLET ORAL
Qty: 90 TABLET | Refills: 0 | Status: SHIPPED | OUTPATIENT
Start: 2023-10-11 | End: 2023-11-10

## 2023-10-11 RX ORDER — METHYLPREDNISOLONE 4 MG/1
TABLET ORAL
Qty: 1 KIT | Refills: 0 | Status: SHIPPED | OUTPATIENT
Start: 2023-10-11 | End: 2023-10-17

## 2023-10-11 RX ORDER — PANTOPRAZOLE SODIUM 40 MG/1
40 TABLET, DELAYED RELEASE ORAL
Qty: 90 TABLET | Refills: 1 | Status: SHIPPED | OUTPATIENT
Start: 2023-10-11

## 2023-10-11 RX ORDER — METOPROLOL SUCCINATE 25 MG/1
TABLET, EXTENDED RELEASE ORAL
Qty: 45 TABLET | Refills: 1 | Status: SHIPPED | OUTPATIENT
Start: 2023-10-11

## 2023-10-11 RX ORDER — ROSUVASTATIN CALCIUM 40 MG/1
40 TABLET, COATED ORAL EVERY EVENING
Qty: 90 TABLET | Refills: 1 | Status: SHIPPED | OUTPATIENT
Start: 2023-10-11

## 2023-10-11 NOTE — PROGRESS NOTES
tablet; TAKE ONE TABLET BY MOUTH EVERY DAY AS NEEDED FOR ANXIETY FOR UP TO 30 DAYS. MAX DAILY AMOUNT IS 1MG, Disp-90 tablet, R-0Normal  6. History of cerebrovascular accident (CVA) due to ischemia  -     rosuvastatin (CRESTOR) 40 MG tablet; Take 1 tablet by mouth every evening, Disp-90 tablet, R-1Normal  7. PAD (peripheral artery disease) ([facility])  -     cilostazol (PLETAL) 100 MG tablet; TAKE ONE TABLET BY MOUTH TWO TIMES A DAY, Disp-180 tablet, R-1Normal  8. Vertigo  -     methylPREDNISolone (MEDROL, AMARI,) 4 MG tablet; Take as directed on insert, Disp-1 kit, R-0Normal  -     fluticasone (FLONASE) 50 MCG/ACT nasal spray; 1 spray by Each Nostril route in the morning and at bedtime, Disp-32 g, R-1Normal  -     Milla  Suzan Earing, APRN-CNP, Neurology, [facility]  9. Vestibular migraine  -     [facility], APR-CNP, Neurology, [facility]  10. Radicular low back pain  -     pregabalin (LYRICA) 25 MG capsule; Take 1 capsule by mouth 2 times daily for 30 days. Max Daily Amount: 50 mg, Disp-60 capsule, R-1Normal  11. Atherosclerosis of native artery of both lower extremities with intermittent claudication ([address])  12. Asymptomatic carotid artery stenosis, bilateral  13. Migraine with aura and without status migrainosus, not intractable    Could be a symptom of uncontrolled BP. Will increase her dose and have her return for a recheck in 2 weeks. We'll need to see if she continues to have dizziness once her BP is under control. May also be associated with her migraine syndrome / vestibular manifestation. No red flags at this time, but she should re-establish with Neurology ot follow w hx CVA and to review the vertigo. No concerns on EKG. She does have serous effusion behin TM, so this could be a cuase as well. Medrol amari and start flonase. Reviewed age and gender appropriate health screening exams and vaccinations.   Advised patient regarding importance of keeping up with recommended health maintenance and to

## 2023-10-22 ASSESSMENT — ENCOUNTER SYMPTOMS
CHEST TIGHTNESS: 0
SHORTNESS OF BREATH: 0
WHEEZING: 0
VOMITING: 0
BACK PAIN: 0
COLOR CHANGE: 0
NAUSEA: 0
DIARRHEA: 0
COUGH: 0
CONSTIPATION: 0
BLOOD IN STOOL: 0
ABDOMINAL PAIN: 0

## 2023-10-29 ENCOUNTER — HOSPITAL ENCOUNTER (EMERGENCY)
Age: 68
Discharge: HOME | End: 2023-10-29
Payer: MEDICARE

## 2023-10-29 ENCOUNTER — APPOINTMENT (OUTPATIENT)
Dept: GENERAL RADIOLOGY | Age: 68
End: 2023-10-29
Payer: MEDICARE

## 2023-10-29 VITALS
TEMPERATURE: 97.6 F | BODY MASS INDEX: 29.99 KG/M2 | RESPIRATION RATE: 20 BRPM | WEIGHT: 169.31 LBS | DIASTOLIC BLOOD PRESSURE: 85 MMHG | OXYGEN SATURATION: 99 % | SYSTOLIC BLOOD PRESSURE: 175 MMHG | HEART RATE: 72 BPM

## 2023-10-29 DIAGNOSIS — M25.552 LEFT HIP PAIN: Primary | ICD-10-CM

## 2023-10-29 PROCEDURE — 96372 THER/PROPH/DIAG INJ SC/IM: CPT

## 2023-10-29 PROCEDURE — 73502 X-RAY EXAM HIP UNI 2-3 VIEWS: CPT

## 2023-10-29 PROCEDURE — 99284 EMERGENCY DEPT VISIT MOD MDM: CPT

## 2023-10-29 PROCEDURE — 6360000002 HC RX W HCPCS: Performed by: PHYSICIAN ASSISTANT

## 2023-10-29 RX ORDER — PREDNISONE 20 MG/1
20 TABLET ORAL 2 TIMES DAILY
Qty: 10 TABLET | Refills: 0 | Status: SHIPPED | OUTPATIENT
Start: 2023-10-29 | End: 2023-11-03

## 2023-10-29 RX ORDER — KETOROLAC TROMETHAMINE 15 MG/ML
30 INJECTION, SOLUTION INTRAMUSCULAR; INTRAVENOUS ONCE
Status: COMPLETED | OUTPATIENT
Start: 2023-10-29 | End: 2023-10-29

## 2023-10-29 RX ORDER — NAPROXEN 500 MG/1
500 TABLET ORAL 2 TIMES DAILY PRN
Qty: 14 TABLET | Refills: 0 | Status: SHIPPED | OUTPATIENT
Start: 2023-10-29

## 2023-10-29 RX ORDER — DEXAMETHASONE SODIUM PHOSPHATE 10 MG/ML
10 INJECTION INTRAMUSCULAR; INTRAVENOUS ONCE
Status: COMPLETED | OUTPATIENT
Start: 2023-10-29 | End: 2023-10-29

## 2023-10-29 RX ADMIN — DEXAMETHASONE SODIUM PHOSPHATE 10 MG: 10 INJECTION INTRAMUSCULAR; INTRAVENOUS at 12:24

## 2023-10-29 RX ADMIN — KETOROLAC TROMETHAMINE 30 MG: 15 INJECTION, SOLUTION INTRAMUSCULAR; INTRAVENOUS at 12:24

## 2023-10-29 ASSESSMENT — PAIN - FUNCTIONAL ASSESSMENT: PAIN_FUNCTIONAL_ASSESSMENT: 0-10

## 2023-10-29 ASSESSMENT — PAIN SCALES - GENERAL: PAINLEVEL_OUTOF10: 5

## 2023-10-29 ASSESSMENT — PAIN DESCRIPTION - LOCATION: LOCATION: HIP

## 2023-10-29 ASSESSMENT — PAIN DESCRIPTION - PAIN TYPE: TYPE: CHRONIC PAIN

## 2023-10-29 ASSESSMENT — PAIN DESCRIPTION - ORIENTATION: ORIENTATION: LEFT

## 2023-10-29 ASSESSMENT — PAIN DESCRIPTION - DESCRIPTORS: DESCRIPTORS: THROBBING;BURNING

## 2023-11-01 NOTE — PROGRESS NOTES
Chief Complaint   Patient presents with    Hip Pain         HPI:    Patient is 77 y.o. female complaining of atraumatic insidious B/L Hip pain for a quite some time. Left hip was very bad last week so she went to ER. Hip is painful each day but not as bad as last week. States that she was given naproxen at ER for pain and a referral. Also has history of lower back pain and did have injections in the past.  Patient denies groin pain. ROS:    Skin: (-) rash,(-) psoriasis,(-) eczema, (-)skin cancer. Neurologic: (-)numbness, (-)tingling, (-)headaches, (-) LOC. Cardiovascular: (-) Chest pain, (-) swelling in legs/feet, (-) SOB, (-) cramping in legs/feet with walking.     All other review of systems negative except stated above or in HPI      Past Medical History:   Diagnosis Date    Anxiety     Back pain     Bilateral carotid artery stenosis 3/12/2021    Chronic back pain     Decreased dorsalis pedis pulse 2/25/2021    Depression     Discoloration of skin of toe 2/25/2021    GERD (gastroesophageal reflux disease)     Headache(784.0)     Hyperlipidemia     Hypertension     Left carotid stenosis 2/25/2021    PVD (peripheral vascular disease) with claudication ([address]) 6/3/2021    Thyroid disease     Tobacco dependence 2/25/2021    Toe cyanosis 2/25/2021    Ulcer of great toe, left, limited to breakdown of skin ([address]) 2/25/2021    Visual field defects 11/2/2021     Past Surgical History:   Procedure Laterality Date    ABLATION OF DYSRHYTHMIC FOCUS      CAROTID ENDARTERECTOMY Left 11/4/2021    LEFT CAROTID ENDARTERECTOMY performed by Los Pimentel MD at [id number] [place] [address]      right foot surgery 5 years ago    HYSTERECTOMY (CERVIX STATUS UNKNOWN)      age 25       Current Outpatient Medications:     naproxen (NAPROSYN) 500 MG tablet, Take 1 tablet by mouth 2 times daily as needed for Pain, Disp: 14 tablet, Rfl: 0    fluticasone (FLONASE) 50 MCG/ACT nasal spray, 1

## 2023-11-08 ENCOUNTER — OFFICE VISIT (OUTPATIENT)
Dept: ORTHOPEDIC SURGERY | Age: 68
End: 2023-11-08
Payer: MEDICARE

## 2023-11-08 VITALS — WEIGHT: 169 LBS | BODY MASS INDEX: 29.95 KG/M2 | HEIGHT: 63 IN | TEMPERATURE: 98 F

## 2023-11-08 DIAGNOSIS — M70.61 TROCHANTERIC BURSITIS OF BOTH HIPS: ICD-10-CM

## 2023-11-08 DIAGNOSIS — M70.62 TROCHANTERIC BURSITIS OF BOTH HIPS: ICD-10-CM

## 2023-11-08 DIAGNOSIS — Z78.9 ACTIVITY OF DAILY LIVING ALTERATION: ICD-10-CM

## 2023-11-08 DIAGNOSIS — M46.1 SACROILIITIS (HCC): ICD-10-CM

## 2023-11-08 DIAGNOSIS — Z71.82 EXERCISE COUNSELING: ICD-10-CM

## 2023-11-08 DIAGNOSIS — M54.30 SCIATICA, UNSPECIFIED LATERALITY: ICD-10-CM

## 2023-11-08 DIAGNOSIS — M54.16 LUMBAR RADICULOPATHY: Primary | ICD-10-CM

## 2023-11-08 PROCEDURE — G8399 PT W/DXA RESULTS DOCUMENT: HCPCS | Performed by: ORTHOPAEDIC SURGERY

## 2023-11-08 PROCEDURE — 1124F ACP DISCUSS-NO DSCNMKR DOCD: CPT | Performed by: ORTHOPAEDIC SURGERY

## 2023-11-08 PROCEDURE — 3017F COLORECTAL CA SCREEN DOC REV: CPT | Performed by: ORTHOPAEDIC SURGERY

## 2023-11-08 PROCEDURE — 99204 OFFICE O/P NEW MOD 45 MIN: CPT | Performed by: ORTHOPAEDIC SURGERY

## 2023-11-08 PROCEDURE — 1090F PRES/ABSN URINE INCON ASSESS: CPT | Performed by: ORTHOPAEDIC SURGERY

## 2023-11-08 PROCEDURE — G8417 CALC BMI ABV UP PARAM F/U: HCPCS | Performed by: ORTHOPAEDIC SURGERY

## 2023-11-08 PROCEDURE — G8427 DOCREV CUR MEDS BY ELIG CLIN: HCPCS | Performed by: ORTHOPAEDIC SURGERY

## 2023-11-08 PROCEDURE — 4004F PT TOBACCO SCREEN RCVD TLK: CPT | Performed by: ORTHOPAEDIC SURGERY

## 2023-11-08 PROCEDURE — G8484 FLU IMMUNIZE NO ADMIN: HCPCS | Performed by: ORTHOPAEDIC SURGERY

## 2023-11-20 ENCOUNTER — OFFICE VISIT (OUTPATIENT)
Dept: PHYSICAL MEDICINE AND REHAB | Age: 68
End: 2023-11-20
Payer: MEDICARE

## 2023-11-20 VITALS
HEART RATE: 68 BPM | DIASTOLIC BLOOD PRESSURE: 80 MMHG | WEIGHT: 170 LBS | BODY MASS INDEX: 30.12 KG/M2 | SYSTOLIC BLOOD PRESSURE: 124 MMHG | HEIGHT: 63 IN

## 2023-11-20 DIAGNOSIS — M48.062 SPINAL STENOSIS OF LUMBAR REGION WITH NEUROGENIC CLAUDICATION: Primary | ICD-10-CM

## 2023-11-20 PROCEDURE — 99204 OFFICE O/P NEW MOD 45 MIN: CPT | Performed by: PHYSICAL MEDICINE & REHABILITATION

## 2023-11-20 PROCEDURE — 3078F DIAST BP <80 MM HG: CPT | Performed by: PHYSICAL MEDICINE & REHABILITATION

## 2023-11-20 PROCEDURE — G8417 CALC BMI ABV UP PARAM F/U: HCPCS | Performed by: PHYSICAL MEDICINE & REHABILITATION

## 2023-11-20 PROCEDURE — 3074F SYST BP LT 130 MM HG: CPT | Performed by: PHYSICAL MEDICINE & REHABILITATION

## 2023-11-20 PROCEDURE — G8484 FLU IMMUNIZE NO ADMIN: HCPCS | Performed by: PHYSICAL MEDICINE & REHABILITATION

## 2023-11-20 PROCEDURE — G8427 DOCREV CUR MEDS BY ELIG CLIN: HCPCS | Performed by: PHYSICAL MEDICINE & REHABILITATION

## 2023-11-20 PROCEDURE — 1090F PRES/ABSN URINE INCON ASSESS: CPT | Performed by: PHYSICAL MEDICINE & REHABILITATION

## 2023-11-20 NOTE — PROGRESS NOTES
Summer Townsend D.O. [facility]  [address] [facility]. [address], [address] [place] [address]  Phone: [phone]  Fax: [phone]    11/20/2023  Consult requested by: Stoney Kate DO  [address] [facility],  [address] [place] [address] for :   Chief Complaint   Patient presents with    Back Pain     New patient      Primary care: Maria C Kang MD    An independent historian was not needed. Hand dominance: RIGHT HAND     Location: low back radiating to bilateral legs    Onset: gradually after no injury 50 years ago . Severity: Pain Score:   4    Quality:burning. Course: worsening     Frequency: episodic and occurs 2 times a week    Alleviating factors: ibuprofen    Exacerbating factors: walking    Red flags: Not present: history of cancer, pain awakening patient from sleep, night sweats, fevers, unintentional weight loss, immunospuression, saddle anesthesia, new bowel or bladder dysfunction, and osteoporosis    Associated symptoms: Not present: falls, subjective weakness, hematuria, nausea, vomiting or rash. Present: paresthesias    Prior treatment: DEISI, ibuprofen, Norco     ADL: Self-care  Mobility: independence Device: None  Exercise: never    Work history: retired [employer]  Education level: [address] [place] [address] system: lives alone    Data reviewed/prior work up:   Review of external notes:   Referring provider's office note  Review of labs:   Lab Results   Component Value Date     02/14/2023    K 3.6 02/14/2023     02/14/2023    CO2 22 02/14/2023    BUN 20 02/14/2023    CREATININE 0.7 02/14/2023    GLUCOSE 107 (H) 02/14/2023    CALCIUM 8.5 (L) 02/14/2023    PROT 5.6 (L) 02/14/2023    LABALBU 3.4 (L) 02/14/2023    BILITOT 0.4 02/14/2023    ALKPHOS 65 02/14/2023    AST 18 02/14/2023    ALT 18 02/14/2023    LABGLOM >60 02/14/2023    GFRAA >60 03/17/2022   Review of prior imaging: Lumbar MRI 2017 [facility]:   1.   Slight interval progression of multilevel degenerative changes in the

## 2023-11-29 ENCOUNTER — TELEPHONE (OUTPATIENT)
Dept: PRIMARY CARE CLINIC | Age: 68
End: 2023-11-29

## 2023-11-29 ENCOUNTER — TELEPHONE (OUTPATIENT)
Dept: PHYSICAL MEDICINE AND REHAB | Age: 68
End: 2023-11-29

## 2023-11-29 DIAGNOSIS — I77.811 ABDOMINAL AORTIC ECTASIA (HCC): Primary | ICD-10-CM

## 2023-11-29 NOTE — TELEPHONE ENCOUNTER
Called and spoke to [place] at Dr Rebecca Speasr office and relayed information regarding abdominal aorta abnormality, they stated they would relay information to  and see what he wants to do

## 2023-11-29 NOTE — TELEPHONE ENCOUNTER
----- Message from Severiano Rivas DO sent at 11/28/2023  5:05 PM EST -----  Test result is abnormal. Showed a significant scoliosis with degenerative changes. Additionally, there was aortic dilation. Please call Dr. Juani Freedman office and confim if they can follow up on the abdominal aorta abnormality?

## 2023-11-29 NOTE — TELEPHONE ENCOUNTER
Taryn Ricks from Dr Yue Garcia office called to inform you that the patient had a Lumbar Xray done and found minimal dilatation of the distal abdominal aorta. They did inform patient of the results and she said she use to see Dr Francie Allred.      Last Appointment:  10/11/2023  Future Appointments   Date Time Provider [address]   6/25/2024  1:00 PM ÓSCAR HAINES  1 SEYZ [facility] Rad/Car   6/25/2024  1:30 PM Roel Aguilera MD VAS/[facility]

## 2023-11-29 NOTE — TELEPHONE ENCOUNTER
I placed an order for a CT scan to assess. She's likely OK to just see Dr José Luis Baig as scheduled in June based on findings.

## 2023-12-08 ENCOUNTER — TELEPHONE (OUTPATIENT)
Dept: PRIMARY CARE CLINIC | Age: 68
End: 2023-12-08

## 2023-12-08 DIAGNOSIS — I73.9 PAD (PERIPHERAL ARTERY DISEASE) (HCC): Primary | ICD-10-CM

## 2023-12-08 NOTE — TELEPHONE ENCOUNTER
Received call from [address] [place] [address] at [address] [facility]. Phone [phone]. She needs a lab order prior to CT with contrast scheduled for 12-14. Thank you  . Electronically signed by Alivia Jain on 12/8/2023 at 3:40 PM

## 2023-12-11 NOTE — TELEPHONE ENCOUNTER
Siobhan from Radiology called regarding patient CT Scan for 12/14/2023. She would like the blood work orders be faxed to them at [phone].   Labs pended

## 2023-12-13 ENCOUNTER — HOSPITAL ENCOUNTER (OUTPATIENT)
Age: 68
Discharge: HOME | End: 2023-12-13
Payer: MEDICARE

## 2023-12-13 DIAGNOSIS — I73.9 PAD (PERIPHERAL ARTERY DISEASE) (HCC): ICD-10-CM

## 2023-12-13 DIAGNOSIS — R79.9 ABNORMAL FINDING OF BLOOD CHEMISTRY, UNSPECIFIED: ICD-10-CM

## 2023-12-13 DIAGNOSIS — F41.9 ANXIETY: ICD-10-CM

## 2023-12-13 LAB
BUN SERPL-MCNC: 15 MG/DL (ref 6–23)
CHOLEST SERPL-MCNC: 214 MG/DL
CREAT SERPL-MCNC: 0.9 MG/DL (ref 0.5–1)
GFR SERPL CREATININE-BSD FRML MDRD: >60 ML/MIN/1.73M2
HBA1C MFR BLD: 5.9 % (ref 4–5.6)
HDLC SERPL-MCNC: 58 MG/DL
LDLC SERPL CALC-MCNC: 129 MG/DL
TRIGL SERPL-MCNC: 136 MG/DL
TSH SERPL DL<=0.05 MIU/L-ACNC: 1.93 UIU/ML (ref 0.27–4.2)
VLDLC SERPL CALC-MCNC: 27 MG/DL

## 2023-12-13 PROCEDURE — 82565 ASSAY OF CREATININE: CPT

## 2023-12-13 PROCEDURE — 36415 COLL VENOUS BLD VENIPUNCTURE: CPT

## 2023-12-13 PROCEDURE — 84520 ASSAY OF UREA NITROGEN: CPT

## 2023-12-13 PROCEDURE — 80061 LIPID PANEL: CPT

## 2023-12-13 PROCEDURE — 83036 HEMOGLOBIN GLYCOSYLATED A1C: CPT

## 2023-12-13 PROCEDURE — 84443 ASSAY THYROID STIM HORMONE: CPT

## 2023-12-14 ENCOUNTER — HOSPITAL ENCOUNTER (OUTPATIENT)
Dept: CT IMAGING | Age: 68
Discharge: HOME | End: 2023-12-16
Attending: FAMILY MEDICINE
Payer: MEDICARE

## 2023-12-14 DIAGNOSIS — I77.811 ABDOMINAL AORTIC ECTASIA (HCC): ICD-10-CM

## 2023-12-14 PROCEDURE — 74174 CTA ABD&PLVS W/CONTRAST: CPT

## 2023-12-14 PROCEDURE — 6360000004 HC RX CONTRAST MEDICATION: Performed by: GENERAL PRACTICE

## 2023-12-14 RX ADMIN — IOPAMIDOL 75 ML: 755 INJECTION, SOLUTION INTRAVENOUS at 08:54

## 2023-12-27 RX ORDER — EZETIMIBE 10 MG/1
10 TABLET ORAL DAILY
Qty: 90 TABLET | Refills: 1 | Status: SHIPPED | OUTPATIENT
Start: 2023-12-27

## 2024-01-11 DIAGNOSIS — F41.9 ANXIETY: ICD-10-CM

## 2024-01-11 RX ORDER — LORAZEPAM 1 MG/1
TABLET ORAL
Qty: 90 TABLET | Refills: 0 | Status: SHIPPED | OUTPATIENT
Start: 2024-01-11 | End: 2024-04-10

## 2024-01-11 NOTE — TELEPHONE ENCOUNTER
LORazepam (ATIVAN) 1 MG tablet     Last Appointment:  10/11/2023  Future Appointments   Date Time Provider Department Center   6/25/2024  1:00 PM ÓSCAR COOK VAS  1 YZ CARDIO [facility] Rad/Car   6/25/2024  1:30 PM Jerzy Ayala MD VASC/MED [facility]

## 2024-04-09 DIAGNOSIS — I10 ESSENTIAL HYPERTENSION: ICD-10-CM

## 2024-04-09 RX ORDER — BUPROPION HYDROCHLORIDE 150 MG/1
TABLET ORAL
Qty: 90 TABLET | Refills: 3 | Status: SHIPPED | OUTPATIENT
Start: 2024-04-09

## 2024-04-09 RX ORDER — PANTOPRAZOLE SODIUM 40 MG/1
TABLET, DELAYED RELEASE ORAL
Qty: 90 TABLET | Refills: 3 | Status: SHIPPED | OUTPATIENT
Start: 2024-04-09

## 2024-04-09 RX ORDER — METOPROLOL SUCCINATE 25 MG/1
TABLET, EXTENDED RELEASE ORAL
Qty: 45 TABLET | Refills: 3 | Status: SHIPPED | OUTPATIENT
Start: 2024-04-09

## 2024-04-09 NOTE — TELEPHONE ENCOUNTER
Last Appointment:  10/11/2023  Future Appointments   Date Time Provider Department Center   6/25/2024  1:00 PM ÓSCAR COOK VAS US 1 YZ CARDIO [facility] Rad/Car   6/25/2024  1:30 PM Jerzy Ayala MD VASC/MED [facility]

## 2024-05-09 ENCOUNTER — TELEPHONE (OUTPATIENT)
Dept: PHARMACY | Facility: CLINIC | Age: 69
End: 2024-05-09

## 2024-05-09 DIAGNOSIS — I10 ESSENTIAL HYPERTENSION: ICD-10-CM

## 2024-05-09 DIAGNOSIS — Z98.890 S/P CAROTID ENDARTERECTOMY: ICD-10-CM

## 2024-05-09 NOTE — TELEPHONE ENCOUNTER
[facility] CLINICAL PHARMACY: ADHERENCE REVIEW  Identified care gap per [employer]: fills at [facility]: ACE/ARB and Statin adherence    ASSESSMENT    ACE/ARB ADHERENCE    Insurance Records claims through 2024 (Prior Year PDC = 97% - PASSED ; YTD PDC = FIRST FILL; Potential Fail Date: 24):     Lisinopril 40mg last filled on 24 for 90 day supply. Next refill due: 24    Prescribed si tablet/capsule daily    Per Insurer Portal: same as above.    Per [facility]:  0 refills remaining.    BP Readings from Last 3 Encounters:   23 124/80   10/29/23 (!) 175/85   10/11/23 (!) 169/86     Estimated Creatinine Clearance: 56 mL/min (based on SCr of 0.9 mg/dL).  Lab Results   Component Value Date    CREATININE 0.9 2023     Lab Results   Component Value Date    K 3.6 2023     STATIN ADHERENCE    Insurance Records claims through 2024 (Prior Year PDC = 73% - FAILED; YTD PDC = FIRST FILL; Potential Fail Date: 24):     Rosuvastatin 40mg last filled on 24 for 90 day supply. Next refill due: 24    Prescribed si tablet/capsule daily    Per Insurer Portal: same as above.    Per [facility]:  0 refills remaining.    Lab Results   Component Value Date    CHOL 214 (H) 2023    TRIG 136 2023    HDL 58 2023     Lab Results   Component Value Date     (H) 2023      ALT   Date Value Ref Range Status   2023 18 0 - 32 U/L Final     AST   Date Value Ref Range Status   2023 18 0 - 31 U/L Final     The ASCVD Risk score (Mckayla DK, et al., 2019) failed to calculate for the following reasons:    The patient has a prior MI or stroke diagnosis     PLAN  Per insurer report, LIS-0 - co-pays are based on tiers and patient is subject to coverage gap.  The following are interventions that have been identified:   Patient OVERDUE refilling Lisinopril 40mg and active on home medication list.     Left vm at pharmacy for them to send over

## 2024-05-10 NOTE — TELEPHONE ENCOUNTER
Last Appointment:  10/11/2023  Future Appointments   Date Time Provider Department Center   6/25/2024  1:00 PM ÓSCAR COOK VAS US 1 YZ CARDIO [facility] Rad/Car   6/25/2024  1:30 PM Jerzy Ayala MD VASC/MED [facility]

## 2024-05-13 RX ORDER — LISINOPRIL 40 MG/1
TABLET ORAL
Qty: 90 TABLET | Refills: 1 | Status: SHIPPED | OUTPATIENT
Start: 2024-05-13

## 2024-06-14 DIAGNOSIS — I65.23 CAROTID STENOSIS, BILATERAL: Primary | ICD-10-CM

## 2024-06-15 DIAGNOSIS — I73.9 PAD (PERIPHERAL ARTERY DISEASE) (HCC): ICD-10-CM

## 2024-06-15 DIAGNOSIS — I10 ESSENTIAL HYPERTENSION: ICD-10-CM

## 2024-06-15 DIAGNOSIS — Z98.890 S/P CAROTID ENDARTERECTOMY: ICD-10-CM

## 2024-06-17 NOTE — TELEPHONE ENCOUNTER
Last Appointment:  10/11/2023  Future Appointments   Date Time Provider Department Center   6/25/2024  1:00 PM ÓSCAR COOK VAS US 1 YZ CARDIO [facility] Rad/Car   6/25/2024  1:30 PM Jerzy Ayala MD VASC/MED [facility]

## 2024-06-18 RX ORDER — LISINOPRIL 40 MG/1
TABLET ORAL
Qty: 90 TABLET | Refills: 1 | Status: SHIPPED | OUTPATIENT
Start: 2024-06-18

## 2024-06-18 RX ORDER — CILOSTAZOL 100 MG/1
TABLET ORAL
Qty: 180 TABLET | Refills: 1 | Status: SHIPPED | OUTPATIENT
Start: 2024-06-18

## 2024-06-25 ENCOUNTER — PATIENT MESSAGE (OUTPATIENT)
Dept: PRIMARY CARE CLINIC | Age: 69
End: 2024-06-25

## 2024-06-25 ENCOUNTER — OFFICE VISIT (OUTPATIENT)
Dept: PRIMARY CARE CLINIC | Age: 69
End: 2024-06-25
Payer: MEDICARE

## 2024-06-25 VITALS
TEMPERATURE: 98.1 F | BODY MASS INDEX: 30.02 KG/M2 | SYSTOLIC BLOOD PRESSURE: 131 MMHG | HEIGHT: 63 IN | OXYGEN SATURATION: 96 % | RESPIRATION RATE: 16 BRPM | HEART RATE: 77 BPM | DIASTOLIC BLOOD PRESSURE: 80 MMHG | WEIGHT: 169.4 LBS

## 2024-06-25 DIAGNOSIS — R73.03 PREDIABETES: ICD-10-CM

## 2024-06-25 DIAGNOSIS — R53.83 FATIGUE, UNSPECIFIED TYPE: ICD-10-CM

## 2024-06-25 DIAGNOSIS — Z12.31 BREAST CANCER SCREENING BY MAMMOGRAM: ICD-10-CM

## 2024-06-25 DIAGNOSIS — M25.561 ACUTE PAIN OF BOTH KNEES: ICD-10-CM

## 2024-06-25 DIAGNOSIS — I70.213 ATHEROSCLEROSIS OF NATIVE ARTERY OF BOTH LOWER EXTREMITIES WITH INTERMITTENT CLAUDICATION (HCC): ICD-10-CM

## 2024-06-25 DIAGNOSIS — E78.5 HYPERLIPIDEMIA, UNSPECIFIED HYPERLIPIDEMIA TYPE: ICD-10-CM

## 2024-06-25 DIAGNOSIS — M25.562 ACUTE PAIN OF BOTH KNEES: ICD-10-CM

## 2024-06-25 DIAGNOSIS — M25.551 RIGHT HIP PAIN: ICD-10-CM

## 2024-06-25 DIAGNOSIS — I10 PRIMARY HYPERTENSION: ICD-10-CM

## 2024-06-25 DIAGNOSIS — I77.811 ABDOMINAL AORTIC ECTASIA (HCC): ICD-10-CM

## 2024-06-25 DIAGNOSIS — R42 VERTIGO: ICD-10-CM

## 2024-06-25 DIAGNOSIS — Z00.00 MEDICARE ANNUAL WELLNESS VISIT, SUBSEQUENT: Primary | ICD-10-CM

## 2024-06-25 DIAGNOSIS — Z98.890 S/P CAROTID ENDARTERECTOMY: ICD-10-CM

## 2024-06-25 DIAGNOSIS — F41.9 ANXIETY: ICD-10-CM

## 2024-06-25 LAB
ALBUMIN: 4.1 G/DL (ref 3.5–5.2)
ALP BLD-CCNC: 79 U/L (ref 35–104)
ALT SERPL-CCNC: 21 U/L (ref 0–32)
ANION GAP SERPL CALCULATED.3IONS-SCNC: 18 MMOL/L (ref 7–16)
AST SERPL-CCNC: 26 U/L (ref 0–31)
BILIRUB SERPL-MCNC: 0.3 MG/DL (ref 0–1.2)
BUN BLDV-MCNC: 13 MG/DL (ref 6–23)
CALCIUM SERPL-MCNC: 9.5 MG/DL (ref 8.6–10.2)
CHLORIDE BLD-SCNC: 100 MMOL/L (ref 98–107)
CHOLESTEROL, TOTAL: 129 MG/DL
CO2: 20 MMOL/L (ref 22–29)
CREAT SERPL-MCNC: 0.9 MG/DL (ref 0.5–1)
GFR, ESTIMATED: 66 ML/MIN/1.73M2
GLUCOSE BLD-MCNC: 84 MG/DL (ref 74–99)
HCT VFR BLD CALC: 39.9 % (ref 34–48)
HDLC SERPL-MCNC: 68 MG/DL
HEMOGLOBIN: 13 G/DL (ref 11.5–15.5)
LDL CHOLESTEROL: 44 MG/DL
MCH RBC QN AUTO: 31.7 PG (ref 26–35)
MCHC RBC AUTO-ENTMCNC: 32.6 G/DL (ref 32–34.5)
MCV RBC AUTO: 97.3 FL (ref 80–99.9)
PDW BLD-RTO: 13.1 % (ref 11.5–15)
PLATELET # BLD: 310 K/UL (ref 130–450)
PMV BLD AUTO: 9.2 FL (ref 7–12)
POTASSIUM SERPL-SCNC: 4.7 MMOL/L (ref 3.5–5)
RBC # BLD: 4.1 M/UL (ref 3.5–5.5)
SODIUM BLD-SCNC: 138 MMOL/L (ref 132–146)
TOTAL PROTEIN: 7 G/DL (ref 6.4–8.3)
TRIGL SERPL-MCNC: 83 MG/DL
TSH SERPL DL<=0.05 MIU/L-ACNC: 1.91 UIU/ML (ref 0.27–4.2)
VITAMIN B-12: 678 PG/ML (ref 211–946)
VLDLC SERPL CALC-MCNC: 17 MG/DL
WBC # BLD: 8.9 K/UL (ref 4.5–11.5)

## 2024-06-25 PROCEDURE — 3017F COLORECTAL CA SCREEN DOC REV: CPT | Performed by: FAMILY MEDICINE

## 2024-06-25 PROCEDURE — G0439 PPPS, SUBSEQ VISIT: HCPCS | Performed by: FAMILY MEDICINE

## 2024-06-25 PROCEDURE — 1124F ACP DISCUSS-NO DSCNMKR DOCD: CPT | Performed by: FAMILY MEDICINE

## 2024-06-25 PROCEDURE — 3075F SYST BP GE 130 - 139MM HG: CPT | Performed by: FAMILY MEDICINE

## 2024-06-25 PROCEDURE — 3079F DIAST BP 80-89 MM HG: CPT | Performed by: FAMILY MEDICINE

## 2024-06-25 RX ORDER — VENLAFAXINE HYDROCHLORIDE 37.5 MG/1
37.5 CAPSULE, EXTENDED RELEASE ORAL DAILY
Qty: 30 CAPSULE | Refills: 3 | Status: SHIPPED | OUTPATIENT
Start: 2024-06-25

## 2024-06-25 RX ORDER — AZELASTINE 1 MG/ML
2 SPRAY, METERED NASAL 2 TIMES DAILY
Qty: 60 ML | Refills: 3 | Status: SHIPPED | OUTPATIENT
Start: 2024-06-25

## 2024-06-25 RX ORDER — FLUTICASONE PROPIONATE 50 MCG
1 SPRAY, SUSPENSION (ML) NASAL 2 TIMES DAILY
Qty: 32 G | Refills: 1 | Status: SHIPPED | OUTPATIENT
Start: 2024-06-25

## 2024-06-25 RX ORDER — TOLTERODINE 2 MG/1
2 CAPSULE, EXTENDED RELEASE ORAL DAILY
Qty: 30 CAPSULE | Refills: 3 | Status: SHIPPED | OUTPATIENT
Start: 2024-06-25

## 2024-06-25 ASSESSMENT — LIFESTYLE VARIABLES
HOW OFTEN DO YOU HAVE SIX OR MORE DRINKS ON ONE OCCASION: 1
HOW MANY STANDARD DRINKS CONTAINING ALCOHOL DO YOU HAVE ON A TYPICAL DAY: 0
HOW OFTEN DO YOU HAVE A DRINK CONTAINING ALCOHOL: 1

## 2024-06-25 NOTE — PROGRESS NOTES
Medicare Annual Wellness Visit    Isabella Ohara is here for Medicare AWV (AWV.)    Assessment & Plan   Medicare annual wellness visit, subsequent  Breast cancer screening by mammogram  -     [facility] AKIRA DIGITAL SCREEN BILATERAL PER PROTOCOL; Future  Prediabetes  -     CBC  -     Comprehensive Metabolic Panel  Vertigo  -     fluticasone (FLONASE) 50 MCG/ACT nasal spray; 1 spray by Each Nostril route in the morning and at bedtime, Disp-32 g, R-1Normal  Primary hypertension  S/P carotid endarterectomy  -     Lipid Panel  Fatigue, unspecified type  -     TSH  -     Vitamin B12  Acute pain of both knees  -     XR KNEE LEFT (3 VIEWS); Future  -     XR KNEE RIGHT (3 VIEWS); Future  Right hip pain  -     XR HIP RIGHT (2-3 VIEWS); Future  Anxiety  -     venlafaxine (EFFEXOR XR) 37.5 MG extended release capsule; Take 1 capsule by mouth daily, Disp-30 capsule, R-3Normal  Hyperlipidemia, unspecified hyperlipidemia type  -     Lipid Panel  Abdominal aortic ectasia (HCC)  Stable on imaging,  on present regimen of statin and zetia and ACEi, continue same medications, continue to monitor with imaging, back in 12 mos.   Atherosclerosis of native artery of both lower extremities with intermittent claudication (HCC)  Controlled on present regimen, continue same medications, continue to monitor with labs, back in 6 mos.     Recommendations for Preventive Services Due: see orders and patient instructions/AVS.  Recommended screening schedule for the next 5-10 years is provided to the patient in written form: see Patient Instructions/AVS.     Return in 2 months (on 8/25/2024) for Medicare Annual Wellness Visit in 1 year.     Subjective   The following acute and/or chronic problems were also addressed today:  See above    Patient's complete Health Risk Assessment and screening values have been reviewed and are found in Flowsheets. The following problems were reviewed today and where indicated follow up appointments were made and/or

## 2024-06-27 ENCOUNTER — HOSPITAL ENCOUNTER (OUTPATIENT)
Dept: GENERAL RADIOLOGY | Age: 69
Discharge: HOME | End: 2024-06-29
Payer: MEDICARE

## 2024-06-27 ENCOUNTER — HOSPITAL ENCOUNTER (OUTPATIENT)
Age: 69
Discharge: HOME | End: 2024-06-29
Payer: MEDICARE

## 2024-06-27 DIAGNOSIS — M25.561 ACUTE PAIN OF BOTH KNEES: ICD-10-CM

## 2024-06-27 DIAGNOSIS — M25.562 ACUTE PAIN OF BOTH KNEES: ICD-10-CM

## 2024-06-27 DIAGNOSIS — M25.551 RIGHT HIP PAIN: ICD-10-CM

## 2024-06-27 PROCEDURE — 73562 X-RAY EXAM OF KNEE 3: CPT

## 2024-06-27 PROCEDURE — 73502 X-RAY EXAM HIP UNI 2-3 VIEWS: CPT

## 2024-06-29 DIAGNOSIS — F41.9 ANXIETY: ICD-10-CM

## 2024-07-01 NOTE — TELEPHONE ENCOUNTER
Last Appointment:  6/25/2024  Future Appointments   Date Time Provider Department Center   7/2/2024 10:15 AM ÓSCAR COOK VAS US 1 SYDNEE CARDIO SE Rad/Car   7/2/2024 10:45 AM Jerzy Ayala MD VASC/MED [facility]   7/3/2024  3:00 PM SYDNEE WALKER [facility] 1 SYDNEE WALKER [facility] Rad/Car

## 2024-07-02 ENCOUNTER — OFFICE VISIT (OUTPATIENT)
Dept: VASCULAR SURGERY | Age: 69
End: 2024-07-02
Payer: MEDICARE

## 2024-07-02 ENCOUNTER — HOSPITAL ENCOUNTER (OUTPATIENT)
Dept: CARDIOLOGY | Age: 69
Discharge: HOME | End: 2024-07-04
Attending: SURGERY
Payer: MEDICARE

## 2024-07-02 VITALS — BODY MASS INDEX: 30.11 KG/M2 | WEIGHT: 170 LBS

## 2024-07-02 DIAGNOSIS — I65.23 CAROTID STENOSIS, BILATERAL: ICD-10-CM

## 2024-07-02 DIAGNOSIS — I65.23 CAROTID STENOSIS, BILATERAL: Primary | ICD-10-CM

## 2024-07-02 LAB
VAS LEFT CCA DIST EDV: 15.8 CM/S
VAS LEFT CCA DIST PSV: 70.9 CM/S
VAS LEFT CCA PROX EDV: 25.9 CM/S
VAS LEFT CCA PROX PSV: 134.8 CM/S
VAS LEFT ECA EDV: 20.4 CM/S
VAS LEFT ECA PSV: 154.3 CM/S
VAS LEFT ICA DIST EDV: 32.6 CM/S
VAS LEFT ICA DIST PSV: 83.5 CM/S
VAS LEFT ICA MID EDV: 29 CM/S
VAS LEFT ICA MID PSV: 79.9 CM/S
VAS LEFT ICA PROX EDV: 19.3 CM/S
VAS LEFT ICA PROX PSV: 76.2 CM/S
VAS LEFT ICA/CCA PSV: 0.6 NO UNITS
VAS LEFT VERTEBRAL EDV: 3 CM/S
VAS LEFT VERTEBRAL PSV: 18.1 CM/S
VAS RIGHT CCA DIST EDV: 18 CM/S
VAS RIGHT CCA DIST PSV: 71.1 CM/S
VAS RIGHT CCA PROX EDV: 15.4 CM/S
VAS RIGHT CCA PROX PSV: 108.7 CM/S
VAS RIGHT ECA EDV: 15 CM/S
VAS RIGHT ECA PSV: 123.9 CM/S
VAS RIGHT ICA DIST EDV: 37.5 CM/S
VAS RIGHT ICA DIST PSV: 144.7 CM/S
VAS RIGHT ICA MID EDV: 66.3 CM/S
VAS RIGHT ICA MID PSV: 230.9 CM/S
VAS RIGHT ICA PROX EDV: 20.6 CM/S
VAS RIGHT ICA PROX PSV: 100.9 CM/S
VAS RIGHT ICA/CCA PSV: 2.1 NO UNITS
VAS RIGHT VERTEBRAL EDV: 15.2 CM/S
VAS RIGHT VERTEBRAL PSV: 70.9 CM/S

## 2024-07-02 PROCEDURE — G8427 DOCREV CUR MEDS BY ELIG CLIN: HCPCS | Performed by: NURSE PRACTITIONER

## 2024-07-02 PROCEDURE — 3017F COLORECTAL CA SCREEN DOC REV: CPT | Performed by: NURSE PRACTITIONER

## 2024-07-02 PROCEDURE — 93880 EXTRACRANIAL BILAT STUDY: CPT

## 2024-07-02 PROCEDURE — 1124F ACP DISCUSS-NO DSCNMKR DOCD: CPT | Performed by: NURSE PRACTITIONER

## 2024-07-02 PROCEDURE — G8417 CALC BMI ABV UP PARAM F/U: HCPCS | Performed by: NURSE PRACTITIONER

## 2024-07-02 PROCEDURE — G8399 PT W/DXA RESULTS DOCUMENT: HCPCS | Performed by: NURSE PRACTITIONER

## 2024-07-02 PROCEDURE — 4004F PT TOBACCO SCREEN RCVD TLK: CPT | Performed by: NURSE PRACTITIONER

## 2024-07-02 PROCEDURE — 1090F PRES/ABSN URINE INCON ASSESS: CPT | Performed by: NURSE PRACTITIONER

## 2024-07-02 PROCEDURE — 99212 OFFICE O/P EST SF 10 MIN: CPT | Performed by: NURSE PRACTITIONER

## 2024-07-02 RX ORDER — LORAZEPAM 1 MG/1
TABLET ORAL
Qty: 90 TABLET | Refills: 0 | Status: SHIPPED | OUTPATIENT
Start: 2024-07-02 | End: 2024-09-30

## 2024-07-02 NOTE — RESULT ENCOUNTER NOTE
Labs are fine. Cholesterol is at goal. No med changes. R knee has arthritis and chronic tendinosis. Left knee and right hip xrays are normal.

## 2024-07-02 NOTE — PROGRESS NOTES
sprays by Nasal route 2 times daily Use in each nostril as directed 6/25/24  Yes Diego Lucas MD   Kayleigh Buitrago MISC by Does not apply route Patient cannot walk 200 ft without stopping to rest.    Expiration 5 years 6/25/24  Yes Diego Lucas MD   fluticasone (FLONASE) 50 MCG/ACT nasal spray 1 spray by Each Nostril route in the morning and at bedtime 6/25/24  Yes Diego Lucas MD   lisinopril (PRINIVIL;ZESTRIL) 40 MG tablet TAKE ONE TABLET BY MOUTH EVERY DAY 6/18/24  Yes Diego Lucas MD   cilostazol (PLETAL) 100 MG tablet TAKE ONE TABLET BY MOUTH TWO TIMES A DAY 6/18/24  Yes Diego Lucas MD   metoprolol succinate (TOPROL XL) 25 MG extended release tablet TAKE ONE-HALF TABLET BY MOUTH ONE TIME DAILY 4/9/24  Yes Diego Lucas MD   pantoprazole (PROTONIX) 40 MG tablet TAKE ONE TABLET BY MOUTH EVERY DAY IN THE MORNING BEFORE BREAKFAST 4/9/24  Yes Diego Lucas MD   ezetimibe (ZETIA) 10 MG tablet Take 1 tablet by mouth daily 12/27/23  Yes Diego Lucas MD   rosuvastatin (CRESTOR) 40 MG tablet Take 1 tablet by mouth every evening 10/11/23  Yes Diego Lucas MD   aspirin 81 MG EC tablet Take 1 tablet by mouth daily 5/12/23  Yes Diego Lucas MD   tolterodine (DETROL LA) 2 MG extended release capsule Take 1 capsule by mouth daily  Patient not taking: Reported on 7/2/2024 6/25/24   Diego Lucas MD   buPROPion (WELLBUTRIN XL) 150 MG extended release tablet TAKE ONE TABLET BY MOUTH EVERY DAY IN THE MORNING  Patient not taking: Reported on 7/2/2024 4/9/24   Diego Lucas MD   pregabalin (LYRICA) 25 MG capsule Take 1 capsule by mouth 2 times daily for 30 days. Max Daily Amount: 50 mg 10/11/23 11/10/23  Diego Lucas MD     Allergies:  Codeine and Demerol    Social History     Socioeconomic History    Marital status:      Spouse name: Not on file    Number of children: Not on file    Years of education: Not on file    Highest education level: Not on file   Occupational

## 2024-07-31 RX ORDER — EZETIMIBE 10 MG/1
10 TABLET ORAL DAILY
Qty: 90 TABLET | Refills: 1 | Status: SHIPPED | OUTPATIENT
Start: 2024-07-31

## 2024-07-31 NOTE — TELEPHONE ENCOUNTER
ezetimibe (ZETIA) 10 MG tablet     Last Appointment:  6/25/2024  Future Appointments   Date Time Provider Department Center   7/15/2025 10:15 AM ÓSCAR COOK VAS  1 SYDNEE CARDIO [facility] Rad/Car   7/15/2025 10:45 AM Jerzy Ayala MD VAS/MED [facility]

## 2024-08-22 ENCOUNTER — TELEPHONE (OUTPATIENT)
Dept: PHARMACY | Facility: CLINIC | Age: 69
End: 2024-08-22

## 2024-08-22 DIAGNOSIS — Z86.73 HISTORY OF CEREBROVASCULAR ACCIDENT (CVA) DUE TO ISCHEMIA: ICD-10-CM

## 2024-08-22 NOTE — TELEPHONE ENCOUNTER
[facility] CLINICAL PHARMACY: ADHERENCE REVIEW  Identified care gap per [employer]: fills at [facility]: Statin adherence    Patient also appears to be prescribed: Statin    ASSESSMENT  STATIN ADHERENCE    Insurance Records claims through 2024 (Prior Year PDC = not reported; YTD PDC = FIRST FILL; Potential Fail Date: 24):   ROSUVASTATIN TAB 40MG last filled on 24 for 90 day supply. Next refill due: 24    Prescribed si tablet/capsule daily    Per [employer] Dispense History: last filled on 24 for 90 day supply.     Per [facility]:  0 refills remaining.    Lab Results   Component Value Date    CHOL 129 2024    TRIG 83 2024    HDL 68 2024     Lab Results   Component Value Date    LDL 44 2024      ALT   Date Value Ref Range Status   2024 21 0 - 32 U/L Final     AST   Date Value Ref Range Status   2024 26 0 - 31 U/L Final     The ASCVD Risk score (Mckayla DK, et al., 2019) failed to calculate for the following reasons:    The patient has a prior MI or stroke diagnosis     PLAN  The following are interventions that have been identified:   Patient OVERDUE refilling ROSUVASTATIN TAB 40MG and active on home medication list.     Attempting to reach patient to review.  Left message asking for return call. [employer] message sent to patient.    Recent Visits  Date Type Provider Dept   24 Office Visit Diego Lucas MD [facility]   10/11/23 Office Visit Diego Lucas MD [facility]   23 Office Visit Christiano Fuentes APRN - CNP [facility]   23 Office Visit Diego Lucas MD [facility]   Showing recent visits within past 540 days with a meds authorizing provider and meeting all other requirements  Future Appointments  No visits were found meeting these conditions.  Showing future appointments within next 150 days with a meds authorizing provider and meeting all other requirements    Future Appointments   Date Time

## 2024-08-22 NOTE — TELEPHONE ENCOUNTER
Last Appointment:  6/25/2024  Future Appointments   Date Time Provider Department Center   7/15/2025 10:15 AM ÓSCAR COOK VAS US 1 YZ CARDIO SE Rad/Car   7/15/2025 10:45 AM Jerzy Ayala MD VASC/MED [facility]

## 2024-08-23 RX ORDER — ROSUVASTATIN CALCIUM 40 MG/1
TABLET, COATED ORAL
Qty: 90 TABLET | Refills: 3 | Status: SHIPPED | OUTPATIENT
Start: 2024-08-23

## 2024-09-28 DIAGNOSIS — F41.9 ANXIETY: ICD-10-CM

## 2024-09-30 NOTE — TELEPHONE ENCOUNTER
Last Appointment:  6/25/2024  Future Appointments   Date Time Provider Department Center   7/15/2025 10:15 AM ÓSCAR COOK VAS US 1 YZ CARDIO SE Rad/Car   7/15/2025 10:45 AM Jerzy Ayala MD VASC/MED [facility]

## 2024-10-01 RX ORDER — LORAZEPAM 1 MG/1
TABLET ORAL
Qty: 90 TABLET | Refills: 0 | Status: SHIPPED | OUTPATIENT
Start: 2024-10-01 | End: 2024-12-30

## 2024-11-03 DIAGNOSIS — F41.9 ANXIETY: ICD-10-CM

## 2024-11-04 NOTE — TELEPHONE ENCOUNTER
venlafaxine (EFFEXOR XR) 37.5 MG extended release capsule     Last Appointment:  6/25/2024  Future Appointments   Date Time Provider Department Center   7/15/2025 10:15 AM ÓSCAR HAINES  1 SYDNEE CARDIO [facility] Rad/Car   7/15/2025 10:45 AM Jerzy Ayala MD VASC/MED [facility]

## 2024-11-05 RX ORDER — VENLAFAXINE HYDROCHLORIDE 37.5 MG/1
37.5 CAPSULE, EXTENDED RELEASE ORAL DAILY
Qty: 90 CAPSULE | Refills: 1 | Status: SHIPPED | OUTPATIENT
Start: 2024-11-05

## 2024-12-20 DIAGNOSIS — I10 ESSENTIAL HYPERTENSION: ICD-10-CM

## 2024-12-20 DIAGNOSIS — Z98.890 S/P CAROTID ENDARTERECTOMY: ICD-10-CM

## 2024-12-20 DIAGNOSIS — F41.9 ANXIETY: ICD-10-CM

## 2024-12-20 NOTE — TELEPHONE ENCOUNTER
Name of Medication(s) Requested:  Requested Prescriptions     Pending Prescriptions Disp Refills    LORazepam (ATIVAN) 1 MG tablet [Pharmacy Med Name: LORazepam Oral Tablet 1 MG] 90 tablet 0     Sig: TAKE ONE TABLET BY MOUTH EVERY DAY AS NEEDED FOR ANXIETY. MAX DAILY AMOUNT: 1 MG    lisinopril (PRINIVIL;ZESTRIL) 40 MG tablet [Pharmacy Med Name: Lisinopril Oral Tablet 40 MG] 90 tablet 0     Sig: TAKE ONE TABLET BY MOUTH EVERY DAY       Medication is on current medication list Yes    Dosage and directions were verified? Yes    Quantity verified: 30 day supply     Pharmacy Verified?  Yes    Last Appointment:  6/25/2024    Future appts:  Future Appointments   Date Time Provider Department Center   7/15/2025 10:15 AM ÓSCAR COOK VAS  1 SEYZ CARDIO [facility] Rad/Car   7/15/2025 10:45 AM Jerzy Ayala MD VAS/MED [facility]        (If no appt send self scheduling link. .REFILLAPPT)  Scheduling request sent?     [] Yes  [] No    Does patient need updated?  [] Yes  [] No

## 2024-12-27 DIAGNOSIS — Z98.890 S/P CAROTID ENDARTERECTOMY: ICD-10-CM

## 2024-12-27 DIAGNOSIS — I10 ESSENTIAL HYPERTENSION: ICD-10-CM

## 2024-12-27 DIAGNOSIS — F41.9 ANXIETY: ICD-10-CM

## 2024-12-27 RX ORDER — LISINOPRIL 40 MG/1
TABLET ORAL
Qty: 90 TABLET | Refills: 0 | OUTPATIENT
Start: 2024-12-27

## 2024-12-27 RX ORDER — LORAZEPAM 1 MG/1
TABLET ORAL
Qty: 90 TABLET | Refills: 0 | Status: SHIPPED | OUTPATIENT
Start: 2024-12-27 | End: 2025-03-27

## 2024-12-27 RX ORDER — LISINOPRIL 40 MG/1
TABLET ORAL
Qty: 90 TABLET | Refills: 1 | Status: SHIPPED | OUTPATIENT
Start: 2024-12-27

## 2024-12-27 NOTE — TELEPHONE ENCOUNTER
Last Appointment:  6/25/2024  Future Appointments   Date Time Provider Department Center   7/15/2025 10:15 AM ÓSCAR COOK VAS US 1 YZ CARDIO [facility] Rad/Car   7/15/2025 10:45 AM Jerzy Ayala MD VASC/MED [facility]

## 2024-12-30 DIAGNOSIS — I10 ESSENTIAL HYPERTENSION: ICD-10-CM

## 2024-12-30 DIAGNOSIS — F41.9 ANXIETY: ICD-10-CM

## 2024-12-30 DIAGNOSIS — Z98.890 S/P CAROTID ENDARTERECTOMY: ICD-10-CM

## 2024-12-30 RX ORDER — LISINOPRIL 40 MG/1
TABLET ORAL
Qty: 90 TABLET | Refills: 0 | OUTPATIENT
Start: 2024-12-30

## 2024-12-30 RX ORDER — LORAZEPAM 1 MG/1
TABLET ORAL
Qty: 90 TABLET | Refills: 0 | OUTPATIENT
Start: 2024-12-30

## 2025-01-02 RX ORDER — LORAZEPAM 1 MG/1
TABLET ORAL
Qty: 90 TABLET | Refills: 0 | Status: SHIPPED | OUTPATIENT
Start: 2025-01-02 | End: 2025-03-27

## 2025-01-02 RX ORDER — LISINOPRIL 40 MG/1
TABLET ORAL
Qty: 90 TABLET | Refills: 1 | Status: SHIPPED | OUTPATIENT
Start: 2025-01-02

## 2025-01-02 NOTE — TELEPHONE ENCOUNTER
Pt called, pharmacy didn't get medications. Called pharmacy and they did not receive RX. Please resubmitt

## 2025-02-04 NOTE — TELEPHONE ENCOUNTER
Name of Medication(s) Requested:  Requested Prescriptions     Pending Prescriptions Disp Refills    ezetimibe (ZETIA) 10 MG tablet [Pharmacy Med Name: Ezetimibe Oral Tablet 10 MG] 90 tablet 0     Sig: TAKE ONE TABLET BY MOUTH DAILY       Medication is on current medication list Yes    Dosage and directions were verified? Yes    Quantity verified: 30 day supply     Pharmacy Verified?  Yes    Last Appointment:  6/25/2024    Future appts:  Future Appointments   Date Time Provider Department Center   7/15/2025 10:15 AM ÓSCAR COOK VAS  1 SEYZ CARDIO [facility] Rad/Car   7/15/2025 10:45 AM Jerzy Ayala MD VAS/MED [facility]        (If no appt send self scheduling link. .REFILLAPPT)  Scheduling request sent?     [] Yes  [] No    Does patient need updated?  [] Yes  [] No

## 2025-02-05 RX ORDER — EZETIMIBE 10 MG/1
10 TABLET ORAL DAILY
Qty: 90 TABLET | Refills: 1 | Status: SHIPPED | OUTPATIENT
Start: 2025-02-05

## 2025-03-27 DIAGNOSIS — F41.9 ANXIETY: ICD-10-CM

## 2025-03-28 RX ORDER — LORAZEPAM 1 MG/1
TABLET ORAL
Qty: 90 TABLET | Refills: 0 | Status: SHIPPED | OUTPATIENT
Start: 2025-03-28 | End: 2025-06-26

## 2025-03-28 NOTE — TELEPHONE ENCOUNTER
Last Appointment:  6/25/2024  Future Appointments   Date Time Provider Department Center   7/15/2025 10:15 AM ÓSCAR COOK VAS US 1 YZ CARDIO [facility] Rad/Car   7/15/2025 10:45 AM Jerzy Ayala MD VASC/MED [facility]

## 2025-04-26 DIAGNOSIS — F41.9 ANXIETY: ICD-10-CM

## 2025-04-28 NOTE — TELEPHONE ENCOUNTER
Name of Medication(s) Requested:  Requested Prescriptions     Pending Prescriptions Disp Refills    azelastine (ASTEPRO) 137 MCG/SPRAY nasal spray [Pharmacy Med Name: Azelastine HCl Nasal Solution 137 MCG/SPRAY] 60 mL 0     Sig: USE TWO SPRAYS IN EACH NOSTRIL TWO TIMES DAILY AS DIRECTED    venlafaxine (EFFEXOR XR) 37.5 MG extended release capsule [Pharmacy Med Name: Venlafaxine HCl ER Oral Capsule Extended Release 24 Hour 37.5 MG] 90 capsule 0     Sig: TAKE ONE CAPSULE BY MOUTH EVERY DAY       Medication is on current medication list Yes    Dosage and directions were verified? Yes    Quantity verified: 30 day supply     Pharmacy Verified?  Yes    Last Appointment:  6/25/2024    Future appts:  Future Appointments   Date Time Provider Department Center   7/15/2025 10:15 AM ÓSCAR COOK VAS  1 SEYZ CARDIO [facility] Rad/Car   7/15/2025 10:45 AM Jerzy Ayala MD VAS/MED [facility]        (If no appt send self scheduling link. .REFILLAPPT)  Scheduling request sent?     [] Yes  [] No    Does patient need updated?  [] Yes  [] No

## 2025-04-29 RX ORDER — AZELASTINE HYDROCHLORIDE 137 UG/1
SPRAY, METERED NASAL
Qty: 60 ML | Refills: 1 | Status: SHIPPED | OUTPATIENT
Start: 2025-04-29

## 2025-04-29 RX ORDER — VENLAFAXINE HYDROCHLORIDE 37.5 MG/1
37.5 CAPSULE, EXTENDED RELEASE ORAL DAILY
Qty: 90 CAPSULE | Refills: 1 | Status: SHIPPED | OUTPATIENT
Start: 2025-04-29

## 2025-05-05 DIAGNOSIS — I10 ESSENTIAL HYPERTENSION: ICD-10-CM

## 2025-05-05 NOTE — TELEPHONE ENCOUNTER
Name of Medication(s) Requested:  Requested Prescriptions     Pending Prescriptions Disp Refills    pantoprazole (PROTONIX) 40 MG tablet [Pharmacy Med Name: Pantoprazole Sodium Oral Tablet Delayed Release 40 MG] 90 tablet 0     Sig: TAKE ONE TABLET BY MOUTH EVERY DAY IN THE MORNING BEFORE BREAKFAST    metoprolol succinate (TOPROL XL) 25 MG extended release tablet [Pharmacy Med Name: Metoprolol Succinate ER Oral Tablet Extended Release 24 Hour 25 MG] 45 tablet 0     Sig: TAKE ONE-HALF TABLET BY MOUTH ONE TIME DAILY       Medication is on current medication list Yes    Dosage and directions were verified? Yes    Quantity verified: 30 day supply     Pharmacy Verified?  Yes    Last Appointment:  6/25/2024    Future appts:  Future Appointments   Date Time Provider Department Center   7/15/2025 10:15 AM ÓSCAR HAINES US 1 SEYZ CARDIO [facility] Rad/Car   7/15/2025 10:45 AM Jerzy Ayala MD VAS/MED [facility]        (If no appt send self scheduling link. .REFILLAPPT)  Scheduling request sent?     [] Yes  [] No    Does patient need updated?  [] Yes  [] No

## 2025-05-06 RX ORDER — METOPROLOL SUCCINATE 25 MG/1
TABLET, EXTENDED RELEASE ORAL
Qty: 45 TABLET | Refills: 1 | Status: SHIPPED | OUTPATIENT
Start: 2025-05-06

## 2025-05-06 RX ORDER — PANTOPRAZOLE SODIUM 40 MG/1
TABLET, DELAYED RELEASE ORAL
Qty: 90 TABLET | Refills: 1 | Status: SHIPPED | OUTPATIENT
Start: 2025-05-06

## 2025-06-13 DIAGNOSIS — I10 ESSENTIAL HYPERTENSION: ICD-10-CM

## 2025-06-13 DIAGNOSIS — Z98.890 S/P CAROTID ENDARTERECTOMY: ICD-10-CM

## 2025-06-13 DIAGNOSIS — F41.9 ANXIETY: ICD-10-CM

## 2025-06-13 RX ORDER — LISINOPRIL 40 MG/1
40 TABLET ORAL DAILY
Qty: 90 TABLET | Refills: 0 | Status: SHIPPED | OUTPATIENT
Start: 2025-06-13

## 2025-06-13 RX ORDER — VENLAFAXINE HYDROCHLORIDE 37.5 MG/1
37.5 CAPSULE, EXTENDED RELEASE ORAL DAILY
Qty: 90 CAPSULE | Refills: 0 | Status: SHIPPED | OUTPATIENT
Start: 2025-06-13

## 2025-06-13 NOTE — TELEPHONE ENCOUNTER
Name of Medication(s) Requested:  Requested Prescriptions     Pending Prescriptions Disp Refills    lisinopril (PRINIVIL;ZESTRIL) 40 MG tablet [Pharmacy Med Name: Lisinopril Oral Tablet 40 MG] 90 tablet 0     Sig: TAKE ONE TABLET BY MOUTH EVERY DAY    venlafaxine (EFFEXOR XR) 37.5 MG extended release capsule [Pharmacy Med Name: Venlafaxine HCl ER Oral Capsule Extended Release 24 Hour 37.5 MG] 90 capsule 0     Sig: TAKE ONE CAPSULE BY MOUTH EVERY DAY       Medication is on current medication list Yes    Dosage and directions were verified? Yes    Quantity verified: 30 day supply     Pharmacy Verified?  Yes    Last Appointment:  6/25/2024    Future appts:  Future Appointments   Date Time Provider Department Center   7/15/2025 10:15 AM ÓSCAR COOK VAS  1 SEYZ CARDIO [facility] Rad/Car   7/15/2025 10:45 AM Jerzy Ayala MD VAS/MED [facility]        (If no appt send self scheduling link. .REFILLAPPT)  Scheduling request sent?     [] Yes  [] No    Does patient need updated?  [] Yes  [] No

## 2025-06-15 DIAGNOSIS — F41.9 ANXIETY: ICD-10-CM

## 2025-06-16 RX ORDER — LORAZEPAM 1 MG/1
TABLET ORAL
Qty: 90 TABLET | Refills: 0 | Status: SHIPPED | OUTPATIENT
Start: 2025-06-16 | End: 2025-09-14

## 2025-06-16 NOTE — TELEPHONE ENCOUNTER
Name of Medication(s) Requested:  Requested Prescriptions     Pending Prescriptions Disp Refills    LORazepam (ATIVAN) 1 MG tablet [Pharmacy Med Name: LORazepam Oral Tablet 1 MG] 90 tablet 0     Sig: TAKE ONE TABLET BY MOUTH EVERY DAY AS NEEDED FOR ANXIETY. MAX DAILY AMOUNT 1 MG       Medication is on current medication list Yes    Dosage and directions were verified? Yes    Quantity verified: 30 day supply     Pharmacy Verified?  Yes    Last Appointment:  6/25/2024    Future appts:  Future Appointments   Date Time Provider Department Center   7/15/2025 10:15 AM ÓSCAR HAINES  1 SEYZ CARDIO [facility] Rad/Car   7/15/2025 10:45 AM Jerzy Ayala MD [facility]        (If no appt send self scheduling link. .REFILLAPPT)  Scheduling request sent?     [] Yes  [] No    Does patient need updated?  [] Yes  [] No

## 2025-06-16 NOTE — TELEPHONE ENCOUNTER
She's had ongoing surgery on her cataracts,unable to make appt for another week because she doesn't have her glasses to drive.

## 2025-06-18 NOTE — TELEPHONE ENCOUNTER
Spoke with patient, she called yesterday and schedule appointment for 06/25/25. Voiced understanding and agreed.

## 2025-06-19 DIAGNOSIS — F41.9 ANXIETY: ICD-10-CM

## 2025-06-20 RX ORDER — LORAZEPAM 1 MG/1
TABLET ORAL
Qty: 90 TABLET | Refills: 0 | OUTPATIENT
Start: 2025-06-20

## 2025-06-25 ENCOUNTER — OFFICE VISIT (OUTPATIENT)
Dept: PRIMARY CARE CLINIC | Age: 70
End: 2025-06-25
Payer: MEDICARE

## 2025-06-25 DIAGNOSIS — R73.03 PREDIABETES: ICD-10-CM

## 2025-06-25 DIAGNOSIS — G89.29 CHRONIC PAIN OF LEFT KNEE: ICD-10-CM

## 2025-06-25 DIAGNOSIS — E66.811 CLASS 1 OBESITY DUE TO EXCESS CALORIES WITH SERIOUS COMORBIDITY AND BODY MASS INDEX (BMI) OF 30.0 TO 30.9 IN ADULT: ICD-10-CM

## 2025-06-25 DIAGNOSIS — R42 VERTIGO: ICD-10-CM

## 2025-06-25 DIAGNOSIS — F41.9 ANXIETY: ICD-10-CM

## 2025-06-25 DIAGNOSIS — E66.09 CLASS 1 OBESITY DUE TO EXCESS CALORIES WITH SERIOUS COMORBIDITY AND BODY MASS INDEX (BMI) OF 30.0 TO 30.9 IN ADULT: ICD-10-CM

## 2025-06-25 DIAGNOSIS — I10 PRIMARY HYPERTENSION: ICD-10-CM

## 2025-06-25 DIAGNOSIS — Z86.73 HISTORY OF CEREBROVASCULAR ACCIDENT (CVA) DUE TO ISCHEMIA: ICD-10-CM

## 2025-06-25 DIAGNOSIS — M25.562 CHRONIC PAIN OF LEFT KNEE: ICD-10-CM

## 2025-06-25 DIAGNOSIS — Z00.00 MEDICARE ANNUAL WELLNESS VISIT, SUBSEQUENT: Primary | ICD-10-CM

## 2025-06-25 DIAGNOSIS — Z87.891 PERSONAL HISTORY OF TOBACCO USE: ICD-10-CM

## 2025-06-25 LAB
ALBUMIN: 4 G/DL (ref 3.5–5.2)
ALP BLD-CCNC: 80 U/L (ref 35–104)
ALT SERPL-CCNC: 21 U/L (ref 0–35)
ANION GAP SERPL CALCULATED.3IONS-SCNC: 11 MMOL/L (ref 7–16)
AST SERPL-CCNC: 29 U/L (ref 0–35)
BILIRUB SERPL-MCNC: 0.3 MG/DL (ref 0–1.2)
BUN BLDV-MCNC: 10 MG/DL (ref 8–23)
CALCIUM SERPL-MCNC: 9.2 MG/DL (ref 8.8–10.2)
CHLORIDE BLD-SCNC: 99 MMOL/L (ref 98–107)
CHOLESTEROL, TOTAL: 122 MG/DL
CO2: 25 MMOL/L (ref 22–29)
CREAT SERPL-MCNC: 0.9 MG/DL (ref 0.5–1)
GFR, ESTIMATED: 66 ML/MIN/1.73M2
GLUCOSE BLD-MCNC: 91 MG/DL (ref 74–99)
HBA1C MFR BLD: 6.1 %
HCT VFR BLD CALC: 37.8 % (ref 34–48)
HDLC SERPL-MCNC: 67 MG/DL
HEMOGLOBIN: 12.6 G/DL (ref 11.5–15.5)
LDL CHOLESTEROL: 36 MG/DL
MCH RBC QN AUTO: 31 PG (ref 26–35)
MCHC RBC AUTO-ENTMCNC: 33.3 G/DL (ref 32–34.5)
MCV RBC AUTO: 93.1 FL (ref 80–99.9)
PDW BLD-RTO: 12.9 % (ref 11.5–15)
PLATELET # BLD: 323 K/UL (ref 130–450)
PMV BLD AUTO: 9 FL (ref 7–12)
POTASSIUM SERPL-SCNC: 4.5 MMOL/L (ref 3.5–5.1)
RBC # BLD: 4.06 M/UL (ref 3.5–5.5)
SODIUM BLD-SCNC: 136 MMOL/L (ref 136–145)
TOTAL PROTEIN: 6.4 G/DL (ref 6.4–8.3)
TRIGL SERPL-MCNC: 97 MG/DL
TSH SERPL DL<=0.05 MIU/L-ACNC: 1.41 UIU/ML (ref 0.27–4.2)
VLDLC SERPL CALC-MCNC: 19 MG/DL
WBC # BLD: 7.9 K/UL (ref 4.5–11.5)

## 2025-06-25 PROCEDURE — 83036 HEMOGLOBIN GLYCOSYLATED A1C: CPT | Performed by: FAMILY MEDICINE

## 2025-06-25 PROCEDURE — 1124F ACP DISCUSS-NO DSCNMKR DOCD: CPT | Performed by: FAMILY MEDICINE

## 2025-06-25 PROCEDURE — 1160F RVW MEDS BY RX/DR IN RCRD: CPT | Performed by: FAMILY MEDICINE

## 2025-06-25 PROCEDURE — 3074F SYST BP LT 130 MM HG: CPT | Performed by: FAMILY MEDICINE

## 2025-06-25 PROCEDURE — 3078F DIAST BP <80 MM HG: CPT | Performed by: FAMILY MEDICINE

## 2025-06-25 PROCEDURE — 1159F MED LIST DOCD IN RCRD: CPT | Performed by: FAMILY MEDICINE

## 2025-06-25 PROCEDURE — 3017F COLORECTAL CA SCREEN DOC REV: CPT | Performed by: FAMILY MEDICINE

## 2025-06-25 PROCEDURE — 36415 COLL VENOUS BLD VENIPUNCTURE: CPT | Performed by: FAMILY MEDICINE

## 2025-06-25 PROCEDURE — G0439 PPPS, SUBSEQ VISIT: HCPCS | Performed by: FAMILY MEDICINE

## 2025-06-25 PROCEDURE — G0296 VISIT TO DETERM LDCT ELIG: HCPCS | Performed by: FAMILY MEDICINE

## 2025-06-25 RX ORDER — ROSUVASTATIN CALCIUM 40 MG/1
40 TABLET, COATED ORAL DAILY
Qty: 90 TABLET | Refills: 0
Start: 2025-06-25

## 2025-06-25 RX ORDER — AZELASTINE HYDROCHLORIDE 137 UG/1
2 SPRAY, METERED NASAL DAILY
Qty: 30 ML | Refills: 5 | Status: SHIPPED | OUTPATIENT
Start: 2025-06-25

## 2025-06-25 RX ORDER — FLUTICASONE PROPIONATE 50 MCG
1 SPRAY, SUSPENSION (ML) NASAL 2 TIMES DAILY
Qty: 32 G | Refills: 2 | Status: SHIPPED | OUTPATIENT
Start: 2025-06-25

## 2025-06-25 RX ORDER — LORATADINE 10 MG/1
10 TABLET ORAL DAILY
Qty: 90 TABLET | Refills: 1 | Status: SHIPPED | OUTPATIENT
Start: 2025-06-25

## 2025-06-25 RX ORDER — BUPROPION HYDROCHLORIDE 150 MG/1
TABLET ORAL
Qty: 90 TABLET | Refills: 3 | Status: SHIPPED | OUTPATIENT
Start: 2025-06-25

## 2025-06-25 RX ORDER — VENLAFAXINE HYDROCHLORIDE 75 MG/1
75 CAPSULE, EXTENDED RELEASE ORAL DAILY
Qty: 90 CAPSULE | Refills: 1 | Status: SHIPPED | OUTPATIENT
Start: 2025-06-25

## 2025-06-25 SDOH — ECONOMIC STABILITY: FOOD INSECURITY: WITHIN THE PAST 12 MONTHS, THE FOOD YOU BOUGHT JUST DIDN'T LAST AND YOU DIDN'T HAVE MONEY TO GET MORE.: NEVER TRUE

## 2025-06-25 SDOH — ECONOMIC STABILITY: FOOD INSECURITY: WITHIN THE PAST 12 MONTHS, YOU WORRIED THAT YOUR FOOD WOULD RUN OUT BEFORE YOU GOT MONEY TO BUY MORE.: NEVER TRUE

## 2025-06-25 ASSESSMENT — PATIENT HEALTH QUESTIONNAIRE - PHQ9
3. TROUBLE FALLING OR STAYING ASLEEP: NOT AT ALL
DEPRESSION UNABLE TO ASSESS: FUNCTIONAL CAPACITY MOTIVATION LIMITS ACCURACY
7. TROUBLE CONCENTRATING ON THINGS, SUCH AS READING THE NEWSPAPER OR WATCHING TELEVISION: NOT AT ALL
5. POOR APPETITE OR OVEREATING: NOT AT ALL
9. THOUGHTS THAT YOU WOULD BE BETTER OFF DEAD, OR OF HURTING YOURSELF: NOT AT ALL
8. MOVING OR SPEAKING SO SLOWLY THAT OTHER PEOPLE COULD HAVE NOTICED. OR THE OPPOSITE, BEING SO FIGETY OR RESTLESS THAT YOU HAVE BEEN MOVING AROUND A LOT MORE THAN USUAL: NOT AT ALL
SUM OF ALL RESPONSES TO PHQ QUESTIONS 1-9: 3
6. FEELING BAD ABOUT YOURSELF - OR THAT YOU ARE A FAILURE OR HAVE LET YOURSELF OR YOUR FAMILY DOWN: NOT AT ALL
2. FEELING DOWN, DEPRESSED OR HOPELESS: SEVERAL DAYS
SUM OF ALL RESPONSES TO PHQ QUESTIONS 1-9: 3
SUM OF ALL RESPONSES TO PHQ QUESTIONS 1-9: 3
1. LITTLE INTEREST OR PLEASURE IN DOING THINGS: MORE THAN HALF THE DAYS
SUM OF ALL RESPONSES TO PHQ QUESTIONS 1-9: 3
4. FEELING TIRED OR HAVING LITTLE ENERGY: NOT AT ALL
10. IF YOU CHECKED OFF ANY PROBLEMS, HOW DIFFICULT HAVE THESE PROBLEMS MADE IT FOR YOU TO DO YOUR WORK, TAKE CARE OF THINGS AT HOME, OR GET ALONG WITH OTHER PEOPLE: NOT DIFFICULT AT ALL

## 2025-06-25 ASSESSMENT — LIFESTYLE VARIABLES
HOW OFTEN DO YOU HAVE A DRINK CONTAINING ALCOHOL: NEVER
HOW MANY STANDARD DRINKS CONTAINING ALCOHOL DO YOU HAVE ON A TYPICAL DAY: PATIENT DOES NOT DRINK

## 2025-06-25 NOTE — PROGRESS NOTES
Medicare Annual Wellness Visit    Isabella Ohara is here for Medicare AWV (AWV and refills.)    Assessment & Plan   Medicare annual wellness visit, subsequent  Prediabetes  -     POCT glycosylated hemoglobin (Hb A1C)  Stable, continue low carb diet, increase exercise   Anxiety  -     venlafaxine (EFFEXOR XR) 75 MG extended release capsule; Take 1 capsule by mouth daily, Disp-90 capsule, R-1Normal  -     TSH  Increase Effexor dose  Chronic pain of left knee  -     [facility]  History of cerebrovascular accident (CVA) due to ischemia  -     rosuvastatin (CRESTOR) 40 MG tablet; Take 1 tablet by mouth daily, Disp-90 tablet, R-0Adjust Sig  -     Lipid Panel  Recheck lipid/LDL  Vertigo  -     fluticasone (FLONASE) 50 MCG/ACT nasal spray; 1 spray by Each Nostril route in the morning and at bedtime, Disp-32 g, R-2Normal  -     azelastine (ASTEPRO) 137 MCG/SPRAY nasal spray; 2 sprays by Nasal route daily, Disp-30 mL, R-5Normal  -     loratadine (CLARITIN) 10 MG tablet; Take 1 tablet by mouth daily, Disp-90 tablet, R-1Normal  Due to allergic rhinitis and eustachian tube dysfunction (bilateral serous effusions on exam)  Primary hypertension  -     CBC  -     Comprehensive Metabolic Panel  Controlled at home, continue same  Personal history of tobacco use  -     buPROPion (WELLBUTRIN XL) 150 MG extended release tablet; TAKE ONE TABLET BY MOUTH EVERY DAY IN THE MORNING, Disp-90 tablet, R-3Normal  -     MA VISIT TO DISCUSS LUNG CA SCREEN W LDCT  Class 1 obesity due to excess calories with serious comorbidity and body mass index (BMI) of 30.0 to 30.9 in adult  -     buPROPion (WELLBUTRIN XL) 150 MG extended release tablet; TAKE ONE TABLET BY MOUTH EVERY DAY IN THE MORNING, Disp-90 tablet, R-3Normal       Return for Medicare Annual Wellness Visit in 1 year.     Subjective       Patient's complete Health Risk Assessment and screening values have been reviewed and are found in Flowsheets. The

## 2025-06-30 VITALS
TEMPERATURE: 98.2 F | RESPIRATION RATE: 16 BRPM | BODY MASS INDEX: 30.9 KG/M2 | DIASTOLIC BLOOD PRESSURE: 70 MMHG | SYSTOLIC BLOOD PRESSURE: 120 MMHG | HEIGHT: 63 IN | OXYGEN SATURATION: 95 % | HEART RATE: 70 BPM | WEIGHT: 174.4 LBS

## 2025-07-07 DIAGNOSIS — I65.23 ASYMPTOMATIC CAROTID ARTERY STENOSIS, BILATERAL: Primary | ICD-10-CM

## 2025-07-15 ENCOUNTER — HOSPITAL ENCOUNTER (OUTPATIENT)
Dept: CARDIOLOGY | Age: 70
Discharge: HOME | End: 2025-07-17
Payer: MEDICARE

## 2025-07-15 ENCOUNTER — OFFICE VISIT (OUTPATIENT)
Dept: VASCULAR SURGERY | Age: 70
End: 2025-07-15
Payer: MEDICARE

## 2025-07-15 DIAGNOSIS — I65.23 BILATERAL CAROTID ARTERY STENOSIS: Primary | ICD-10-CM

## 2025-07-15 DIAGNOSIS — I65.23 ASYMPTOMATIC CAROTID ARTERY STENOSIS, BILATERAL: ICD-10-CM

## 2025-07-15 LAB
VAS LEFT CCA DIST EDV: 18.2 CM/S
VAS LEFT CCA DIST PSV: 70.9 CM/S
VAS LEFT CCA PROX EDV: 23.5 CM/S
VAS LEFT CCA PROX PSV: 116.6 CM/S
VAS LEFT ECA EDV: 21.6 CM/S
VAS LEFT ECA PSV: 158.8 CM/S
VAS LEFT ICA DIST EDV: 29 CM/S
VAS LEFT ICA DIST PSV: 85.6 CM/S
VAS LEFT ICA MID EDV: 21.7 CM/S
VAS LEFT ICA MID PSV: 67.7 CM/S
VAS LEFT ICA PROX EDV: 18.1 CM/S
VAS LEFT ICA PROX PSV: 90.8 CM/S
VAS LEFT ICA/CCA PSV: 0.8 NO UNITS
VAS LEFT VERTEBRAL EDV: 9.9 CM/S
VAS LEFT VERTEBRAL PSV: 20.2 CM/S
VAS RIGHT CCA DIST EDV: 18 CM/S
VAS RIGHT CCA DIST PSV: 80.2 CM/S
VAS RIGHT CCA PROX EDV: 12.8 CM/S
VAS RIGHT CCA PROX PSV: 98.3 CM/S
VAS RIGHT ECA EDV: 12.9 CM/S
VAS RIGHT ECA PSV: 123.9 CM/S
VAS RIGHT ICA DIST EDV: 35.8 CM/S
VAS RIGHT ICA DIST PSV: 162 CM/S
VAS RIGHT ICA MID EDV: 48.7 CM/S
VAS RIGHT ICA MID PSV: 193 CM/S
VAS RIGHT ICA PROX EDV: 18.1 CM/S
VAS RIGHT ICA PROX PSV: 96.6 CM/S
VAS RIGHT ICA/CCA PSV: 2 NO UNITS
VAS RIGHT VERTEBRAL EDV: 27.2 CM/S
VAS RIGHT VERTEBRAL PSV: 100.2 CM/S

## 2025-07-15 PROCEDURE — 1124F ACP DISCUSS-NO DSCNMKR DOCD: CPT

## 2025-07-15 PROCEDURE — 1090F PRES/ABSN URINE INCON ASSESS: CPT

## 2025-07-15 PROCEDURE — 1159F MED LIST DOCD IN RCRD: CPT

## 2025-07-15 PROCEDURE — 93880 EXTRACRANIAL BILAT STUDY: CPT

## 2025-07-15 PROCEDURE — 3017F COLORECTAL CA SCREEN DOC REV: CPT

## 2025-07-15 PROCEDURE — G8427 DOCREV CUR MEDS BY ELIG CLIN: HCPCS

## 2025-07-15 PROCEDURE — G8417 CALC BMI ABV UP PARAM F/U: HCPCS

## 2025-07-15 PROCEDURE — 4004F PT TOBACCO SCREEN RCVD TLK: CPT

## 2025-07-15 PROCEDURE — 99213 OFFICE O/P EST LOW 20 MIN: CPT

## 2025-07-15 PROCEDURE — G8399 PT W/DXA RESULTS DOCUMENT: HCPCS

## 2025-07-15 NOTE — PROGRESS NOTES
Vascular Surgery Outpatient Progress Note      Chief Complaint   Patient presents with    Follow-up     ирина       HISTORY OF PRESENT ILLNESS:                The patient is a 70 y.o. female who returns for follow-up evaluation of carotid artery disease s/p L CEA.  She denies any symptoms of stroke, ministroke, or amaurosis fugax.  She denies any new problems with her legs other than some occasional numbness in her toes. She denies rest pain, claudication, or ulcers.    Past Medical History:        Diagnosis Date    Anxiety     Back pain     Bilateral carotid artery stenosis 3/12/2021    Chronic back pain     Decreased dorsalis pedis pulse 2/25/2021    Depression     Discoloration of skin of toe 2/25/2021    GERD (gastroesophageal reflux disease)     Headache(784.0)     Hyperlipidemia     Hypertension     Left carotid stenosis 2/25/2021    PVD (peripheral vascular disease) with claudication 6/3/2021    Thyroid disease     Tobacco dependence 2/25/2021    Toe cyanosis 2/25/2021    Ulcer of great toe, left, limited to breakdown of skin (HCC) 2/25/2021    Visual field defects 11/2/2021     Past Surgical History:        Procedure Laterality Date    ABLATION OF DYSRHYTHMIC FOCUS      CAROTID ENDARTERECTOMY Left 11/4/2021    LEFT CAROTID ENDARTERECTOMY performed by Jerzy Ayala MD at [facility] OR    CARPAL TUNNEL RELEASE      CHOLECYSTECTOMY      FOOT SURGERY      right foot surgery 5 years ago    HYSTERECTOMY (CERVIX STATUS UNKNOWN)      age 28     Current Medications:   Prior to Admission medications    Medication Sig Start Date End Date Taking? Authorizing Provider   venlafaxine (EFFEXOR XR) 75 MG extended release capsule Take 1 capsule by mouth daily 6/25/25  Yes Diego Lucas MD   buPROPion (WELLBUTRIN XL) 150 MG extended release tablet TAKE ONE TABLET BY MOUTH EVERY DAY IN THE MORNING 6/25/25  Yes Diego Lucas MD   rosuvastatin (CRESTOR) 40 MG tablet Take 1 tablet by mouth daily 6/25/25  Yes Diego Lucas MD

## 2025-08-13 RX ORDER — EZETIMIBE 10 MG/1
10 TABLET ORAL DAILY
Qty: 90 TABLET | Refills: 1 | Status: SHIPPED | OUTPATIENT
Start: 2025-08-13

## (undated) DEVICE — NEEDLE HYPO 18GA L1.5IN PNK POLYPR HUB S STL REG BVL STR

## (undated) DEVICE — LOOP VES W25MM THK1MM MAXI RED SIL FLD REPELLENT 100 PER

## (undated) DEVICE — GARMENT,MEDLINE,DVT,INT,CALF,MED, GEN2: Brand: MEDLINE

## (undated) DEVICE — SOLUTION IRRIG 1000ML 0.9% SOD CHL USP POUR PLAS BTL

## (undated) DEVICE — SET SURG BASIN MAYO REUSABLE

## (undated) DEVICE — SOLUTION IV 500ML 0.9% SOD CHL PH 5 INJ USP VIAFLX PLAS

## (undated) DEVICE — SURGICAL PROCEDURE PACK VASC MAJ CUST

## (undated) DEVICE — TOTAL TRAY, 16FR 10ML SIL FOLEY, URN: Brand: MEDLINE

## (undated) DEVICE — TOWEL,OR,DSP,ST,BLUE,STD,6/PK,12PK/CS: Brand: MEDLINE

## (undated) DEVICE — GLOVE SURG SZ 75 CRM LTX FREE POLYISOPRENE POLYMER BEAD ANTI

## (undated) DEVICE — BLADE CLIPPER GEN PURP NS

## (undated) DEVICE — LABEL MED 4 IN SURG PANEL W/ PEN STRL

## (undated) DEVICE — 3M™ IOBAN™ 2 ANTIMICROBIAL INCISE DRAPE 6640EZ: Brand: IOBAN™ 2

## (undated) DEVICE — GLOVE SURG SZ 75 L12IN FNGR THK79MIL GRN LTX FREE

## (undated) DEVICE — CLOTH SURG PREP PREOPERATIVE CHLORHEXIDINE GLUC 2% READYPREP

## (undated) DEVICE — LAPAROTOMY DRAPE WITH POUCHES: Brand: CONVERTORS

## (undated) DEVICE — SOLUTION IRRIG 1000ML STRL H2O USP PLAS POUR BTL

## (undated) DEVICE — SYSTEM CATH 20GA L1IN OD1.0668-1.143MM ID0.7874-0.8636MM

## (undated) DEVICE — SET INSTR ART 1

## (undated) DEVICE — CATHETER ETER URETH 24FR L16IN RED RUB INTMIT ROB MOD BARDX

## (undated) DEVICE — CATHETER URETH 16FR BLLN 5CC STD LTX 2 W F SGL DRNGE EYE M

## (undated) DEVICE — SET SURG INSTR ART III

## (undated) DEVICE — ELECTRODE PT RET AD L9FT HI MOIST COND ADH HYDRGEL CORDED

## (undated) DEVICE — Z DISCONTINUED PER MEDLINE USE 2425483 TAPE UMB L30IN DIA1/8IN WHT COT NONABSORBABLE W/O NDL FOR

## (undated) DEVICE — ADHESIVE SKIN CLOSURE TOP 36 CC HI VISC DERMBND MINI

## (undated) DEVICE — NEEDLE HYPO 21GA L1.5IN GRN POLYPR HUB S STL REG BVL STR

## (undated) DEVICE — GOWN,SIRUS,FABRNF,XL,20/CS: Brand: MEDLINE

## (undated) DEVICE — NEEDLE HYPO 26GA L0.625IN TAN POLYPR HUB S STL REG BVL STR

## (undated) DEVICE — DOUBLE BASIN SET: Brand: MEDLINE INDUSTRIES, INC.

## (undated) DEVICE — GOWN,AURORA,NONREINF,RAGLAN,L,STERILE: Brand: MEDLINE

## (undated) DEVICE — CLAMP INSERT: Brand: STEALTH® CLAMP INSERT

## (undated) DEVICE — CATHETER,URETHRAL,REDRUBBER,STERILE,22FR: Brand: MEDLINE

## (undated) DEVICE — MAGNETIC INSTR DRAPE 20X16: Brand: MEDLINE INDUSTRIES, INC.